# Patient Record
Sex: FEMALE | Race: WHITE
[De-identification: names, ages, dates, MRNs, and addresses within clinical notes are randomized per-mention and may not be internally consistent; named-entity substitution may affect disease eponyms.]

---

## 2020-06-18 ENCOUNTER — HOSPITAL ENCOUNTER (EMERGENCY)
Dept: HOSPITAL 83 - ED | Age: 40
Discharge: HOME | End: 2020-06-18
Payer: COMMERCIAL

## 2020-06-18 VITALS — WEIGHT: 230 LBS | BODY MASS INDEX: 38.32 KG/M2 | HEIGHT: 65 IN
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
## 2020-06-18 DIAGNOSIS — Z91.030: ICD-10-CM
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
## 2020-06-18 DIAGNOSIS — S82.891A: Primary | ICD-10-CM
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
## 2020-06-18 DIAGNOSIS — Z88.0: ICD-10-CM

## 2020-06-18 DIAGNOSIS — Z79.899: ICD-10-CM

## 2022-05-25 ENCOUNTER — HOSPITAL ENCOUNTER (EMERGENCY)
Dept: HOSPITAL 83 - ED | Age: 42
Discharge: HOME | End: 2022-05-25
Payer: COMMERCIAL

## 2022-05-25 VITALS — WEIGHT: 200 LBS | HEIGHT: 55 IN

## 2022-05-25 DIAGNOSIS — B34.9: Primary | ICD-10-CM

## 2022-05-25 LAB
ALP SERPL-CCNC: 70 U/L (ref 45–117)
ALT SERPL W P-5'-P-CCNC: 20 U/L (ref 12–78)
AST SERPL-CCNC: 9 IU/L (ref 3–35)
B-HCG SERPL-ACNC: < 1 MIU/ML (ref 1–3)
BASOPHILS # BLD AUTO: 0 10*3/UL (ref 0–0.1)
BASOPHILS NFR BLD AUTO: 0.2 % (ref 0–1)
BUN SERPL-MCNC: 8 MG/DL (ref 7–24)
CHLORIDE SERPL-SCNC: 110 MMOL/L (ref 98–107)
CREAT SERPL-MCNC: 0.72 MG/DL (ref 0.55–1.02)
EOSINOPHIL # BLD AUTO: 0.1 10*3/UL (ref 0–0.4)
EOSINOPHIL # BLD AUTO: 0.7 % (ref 1–4)
ERYTHROCYTE [DISTWIDTH] IN BLOOD BY AUTOMATED COUNT: 13 % (ref 0–14.5)
HCT VFR BLD AUTO: 38.3 % (ref 37–47)
LYMPHOCYTES # BLD AUTO: 1.2 10*3/UL (ref 1.3–4.4)
LYMPHOCYTES NFR BLD AUTO: 11 % (ref 27–41)
MCH RBC QN AUTO: 32.7 PG (ref 27–31)
MCHC RBC AUTO-ENTMCNC: 33.7 G/DL (ref 33–37)
MCV RBC AUTO: 97 FL (ref 81–99)
MONOCYTES # BLD AUTO: 0.7 10*3/UL (ref 0.1–1)
MONOCYTES NFR BLD MANUAL: 6.6 % (ref 3–9)
NEUT #: 8.7 10*3/UL (ref 2.3–7.9)
NEUT %: 81.1 % (ref 47–73)
NRBC BLD QL AUTO: 0 % (ref 0–0)
PLATELET # BLD AUTO: 169 10*3/UL (ref 130–400)
PMV BLD AUTO: 11.7 FL (ref 9.6–12.3)
POTASSIUM SERPL-SCNC: 3.8 MMOL/L (ref 3.5–5.1)
PROT SERPL-MCNC: 6.1 GM/DL (ref 6.4–8.2)
RBC # BLD AUTO: 3.95 10*6/UL (ref 4.1–5.1)
SODIUM SERPL-SCNC: 141 MMOL/L (ref 136–145)
WBC NRBC COR # BLD AUTO: 10.8 10*3/UL (ref 4.8–10.8)

## 2022-07-19 ENCOUNTER — HOSPITAL ENCOUNTER (EMERGENCY)
Dept: HOSPITAL 83 - ED | Age: 42
LOS: 1 days | Discharge: ANOTHER HOSPITAL | End: 2022-07-20
Payer: COMMERCIAL

## 2022-07-19 VITALS — WEIGHT: 208 LBS | BODY MASS INDEX: 33.43 KG/M2 | HEIGHT: 65.98 IN

## 2022-07-19 DIAGNOSIS — Z90.49: ICD-10-CM

## 2022-07-19 DIAGNOSIS — Z88.0: ICD-10-CM

## 2022-07-19 DIAGNOSIS — Z90.710: ICD-10-CM

## 2022-07-19 DIAGNOSIS — Z91.013: ICD-10-CM

## 2022-07-19 DIAGNOSIS — N13.30: ICD-10-CM

## 2022-07-19 DIAGNOSIS — Z79.899: ICD-10-CM

## 2022-07-19 DIAGNOSIS — Z98.51: ICD-10-CM

## 2022-07-19 DIAGNOSIS — K57.32: Primary | ICD-10-CM

## 2022-07-19 LAB
ALP SERPL-CCNC: 92 U/L (ref 45–117)
ALT SERPL W P-5'-P-CCNC: 17 U/L (ref 12–78)
AST SERPL-CCNC: 17 IU/L (ref 3–35)
B-HCG SERPL-ACNC: < 1 MIU/ML (ref 1–3)
BACTERIA #/AREA URNS HPF: (no result) /[HPF]
BASOPHILS # BLD AUTO: 0 10*3/UL (ref 0–0.1)
BASOPHILS NFR BLD AUTO: 0.4 % (ref 0–1)
BUN SERPL-MCNC: 10 MG/DL (ref 7–24)
CHLORIDE SERPL-SCNC: 109 MMOL/L (ref 98–107)
CREAT SERPL-MCNC: 0.76 MG/DL (ref 0.55–1.02)
EOSINOPHIL # BLD AUTO: 0.1 10*3/UL (ref 0–0.4)
EOSINOPHIL # BLD AUTO: 1.3 % (ref 1–4)
EPI CELLS #/AREA URNS HPF: (no result) /[HPF]
ERYTHROCYTE [DISTWIDTH] IN BLOOD BY AUTOMATED COUNT: 12.1 % (ref 0–14.5)
HCT VFR BLD AUTO: 39.3 % (ref 37–47)
LIPASE SERPL-CCNC: 64 U/L (ref 73–393)
LYMPHOCYTES # BLD AUTO: 0.9 10*3/UL (ref 1.3–4.4)
LYMPHOCYTES NFR BLD AUTO: 12.8 % (ref 27–41)
MCH RBC QN AUTO: 30.6 PG (ref 27–31)
MCHC RBC AUTO-ENTMCNC: 32.3 G/DL (ref 33–37)
MCV RBC AUTO: 94.7 FL (ref 81–99)
MONOCYTES # BLD AUTO: 0.5 10*3/UL (ref 0.1–1)
MONOCYTES NFR BLD MANUAL: 7.3 % (ref 3–9)
NEUT #: 5.3 10*3/UL (ref 2.3–7.9)
NEUT %: 77.9 % (ref 47–73)
NRBC BLD QL AUTO: 0 10*3/UL (ref 0–0)
PH UR STRIP: 6 [PH] (ref 4.5–8)
PLATELET # BLD AUTO: 258 10*3/UL (ref 130–400)
PMV BLD AUTO: 10.2 FL (ref 9.6–12.3)
POTASSIUM SERPL-SCNC: 3.6 MMOL/L (ref 3.5–5.1)
PROT SERPL-MCNC: 6.6 GM/DL (ref 6.4–8.2)
RBC # BLD AUTO: 4.15 10*6/UL (ref 4.1–5.1)
SODIUM SERPL-SCNC: 137 MMOL/L (ref 136–145)
SP GR UR: <= 1.005 (ref 1–1.03)
UROBILINOGEN UR STRIP-MCNC: 0.2 E.U./DL (ref 0–1)
WBC #/AREA URNS HPF: (no result) WBC/HPF (ref 0–5)
WBC NRBC COR # BLD AUTO: 6.7 10*3/UL (ref 4.8–10.8)

## 2022-07-20 ENCOUNTER — HOSPITAL ENCOUNTER (INPATIENT)
Age: 42
LOS: 6 days | Discharge: HOME HEALTH | DRG: 244 | End: 2022-07-26
Attending: INTERNAL MEDICINE | Admitting: INTERNAL MEDICINE
Payer: MEDICAID

## 2022-07-20 DIAGNOSIS — K57.20 COLONIC DIVERTICULAR ABSCESS: Primary | ICD-10-CM

## 2022-07-20 PROBLEM — K21.9 GASTROESOPHAGEAL REFLUX DISEASE: Status: ACTIVE | Noted: 2022-07-20

## 2022-07-20 PROBLEM — L40.50 PSORIATIC ARTHRITIS (HCC): Status: ACTIVE | Noted: 2022-07-20

## 2022-07-20 PROBLEM — K57.92 DIVERTICULITIS: Status: ACTIVE | Noted: 2022-07-20

## 2022-07-20 PROBLEM — N13.30 HYDRONEPHROSIS: Status: ACTIVE | Noted: 2022-07-20

## 2022-07-20 PROBLEM — K57.80 DIVERTICULITIS OF INTESTINE WITH ABSCESS: Status: ACTIVE | Noted: 2022-07-20

## 2022-07-20 LAB
ALBUMIN SERPL-MCNC: 3 G/DL (ref 3.5–5.2)
ALP BLD-CCNC: 79 U/L (ref 35–104)
ALT SERPL-CCNC: 8 U/L (ref 0–32)
ANION GAP SERPL CALCULATED.3IONS-SCNC: 10 MMOL/L (ref 7–16)
AST SERPL-CCNC: 16 U/L (ref 0–31)
BACTERIA: ABNORMAL /HPF
BASOPHILS ABSOLUTE: 0.02 E9/L (ref 0–0.2)
BASOPHILS RELATIVE PERCENT: 0.3 % (ref 0–2)
BILIRUB SERPL-MCNC: 0.2 MG/DL (ref 0–1.2)
BILIRUBIN URINE: NEGATIVE
BLOOD, URINE: ABNORMAL
BUN BLDV-MCNC: 9 MG/DL (ref 6–20)
CALCIUM SERPL-MCNC: 8.5 MG/DL (ref 8.6–10.2)
CHLORIDE BLD-SCNC: 105 MMOL/L (ref 98–107)
CLARITY: CLEAR
CO2: 23 MMOL/L (ref 22–29)
COLOR: YELLOW
CREAT SERPL-MCNC: 0.7 MG/DL (ref 0.5–1)
EOSINOPHILS ABSOLUTE: 0.06 E9/L (ref 0.05–0.5)
EOSINOPHILS RELATIVE PERCENT: 1 % (ref 0–6)
EPITHELIAL CELLS, UA: ABNORMAL /HPF
GFR AFRICAN AMERICAN: >60
GFR NON-AFRICAN AMERICAN: >60 ML/MIN/1.73
GLUCOSE BLD-MCNC: 110 MG/DL (ref 74–99)
GLUCOSE URINE: NEGATIVE MG/DL
HCT VFR BLD CALC: 33.8 % (ref 34–48)
HEMOGLOBIN: 11.1 G/DL (ref 11.5–15.5)
IMMATURE GRANULOCYTES #: 0.02 E9/L
IMMATURE GRANULOCYTES %: 0.3 % (ref 0–5)
INR BLD: 1.3
KETONES, URINE: NEGATIVE MG/DL
LEUKOCYTE ESTERASE, URINE: NEGATIVE
LYMPHOCYTES ABSOLUTE: 1.08 E9/L (ref 1.5–4)
LYMPHOCYTES RELATIVE PERCENT: 18.9 % (ref 20–42)
MCH RBC QN AUTO: 30.8 PG (ref 26–35)
MCHC RBC AUTO-ENTMCNC: 32.8 % (ref 32–34.5)
MCV RBC AUTO: 93.9 FL (ref 80–99.9)
MONOCYTES ABSOLUTE: 0.41 E9/L (ref 0.1–0.95)
MONOCYTES RELATIVE PERCENT: 7.2 % (ref 2–12)
NEUTROPHILS ABSOLUTE: 4.13 E9/L (ref 1.8–7.3)
NEUTROPHILS RELATIVE PERCENT: 72.3 % (ref 43–80)
NITRITE, URINE: NEGATIVE
PDW BLD-RTO: 12 FL (ref 11.5–15)
PH UA: 6 (ref 5–9)
PLATELET # BLD: 226 E9/L (ref 130–450)
PMV BLD AUTO: 10.6 FL (ref 7–12)
POTASSIUM SERPL-SCNC: 3.8 MMOL/L (ref 3.5–5)
PROTEIN UA: NEGATIVE MG/DL
PROTHROMBIN TIME: 14.9 SEC (ref 9.3–12.4)
RBC # BLD: 3.6 E12/L (ref 3.5–5.5)
RBC UA: ABNORMAL /HPF (ref 0–2)
SODIUM BLD-SCNC: 138 MMOL/L (ref 132–146)
SPECIFIC GRAVITY UA: <=1.005 (ref 1–1.03)
TOTAL PROTEIN: 5.8 G/DL (ref 6.4–8.3)
UROBILINOGEN, URINE: 0.2 E.U./DL
WBC # BLD: 5.7 E9/L (ref 4.5–11.5)
WBC UA: ABNORMAL /HPF (ref 0–5)

## 2022-07-20 PROCEDURE — C9113 INJ PANTOPRAZOLE SODIUM, VIA: HCPCS | Performed by: INTERNAL MEDICINE

## 2022-07-20 PROCEDURE — 2580000003 HC RX 258: Performed by: INTERNAL MEDICINE

## 2022-07-20 PROCEDURE — 80053 COMPREHEN METABOLIC PANEL: CPT

## 2022-07-20 PROCEDURE — 36415 COLL VENOUS BLD VENIPUNCTURE: CPT

## 2022-07-20 PROCEDURE — 2580000003 HC RX 258: Performed by: STUDENT IN AN ORGANIZED HEALTH CARE EDUCATION/TRAINING PROGRAM

## 2022-07-20 PROCEDURE — 6360000002 HC RX W HCPCS: Performed by: INTERNAL MEDICINE

## 2022-07-20 PROCEDURE — 6370000000 HC RX 637 (ALT 250 FOR IP): Performed by: INTERNAL MEDICINE

## 2022-07-20 PROCEDURE — 99223 1ST HOSP IP/OBS HIGH 75: CPT | Performed by: INTERNAL MEDICINE

## 2022-07-20 PROCEDURE — 6360000002 HC RX W HCPCS: Performed by: STUDENT IN AN ORGANIZED HEALTH CARE EDUCATION/TRAINING PROGRAM

## 2022-07-20 PROCEDURE — 2060000000 HC ICU INTERMEDIATE R&B

## 2022-07-20 PROCEDURE — 87040 BLOOD CULTURE FOR BACTERIA: CPT

## 2022-07-20 PROCEDURE — 85610 PROTHROMBIN TIME: CPT

## 2022-07-20 PROCEDURE — 85025 COMPLETE CBC W/AUTO DIFF WBC: CPT

## 2022-07-20 PROCEDURE — 81001 URINALYSIS AUTO W/SCOPE: CPT

## 2022-07-20 PROCEDURE — 2500000003 HC RX 250 WO HCPCS: Performed by: STUDENT IN AN ORGANIZED HEALTH CARE EDUCATION/TRAINING PROGRAM

## 2022-07-20 RX ORDER — DICYCLOMINE HYDROCHLORIDE 10 MG/1
10 CAPSULE ORAL 3 TIMES DAILY
Status: ON HOLD | COMMUNITY
End: 2022-09-15

## 2022-07-20 RX ORDER — CIPROFLOXACIN 500 MG/1
500 TABLET, FILM COATED ORAL 2 TIMES DAILY
Status: ON HOLD | COMMUNITY
End: 2022-07-25 | Stop reason: HOSPADM

## 2022-07-20 RX ORDER — PANTOPRAZOLE SODIUM 40 MG/10ML
40 INJECTION, POWDER, LYOPHILIZED, FOR SOLUTION INTRAVENOUS DAILY
Status: DISCONTINUED | OUTPATIENT
Start: 2022-07-20 | End: 2022-07-25 | Stop reason: ALTCHOICE

## 2022-07-20 RX ORDER — POLYETHYLENE GLYCOL 3350 17 G/17G
17 POWDER, FOR SOLUTION ORAL DAILY PRN
Status: DISCONTINUED | OUTPATIENT
Start: 2022-07-20 | End: 2022-07-26 | Stop reason: HOSPADM

## 2022-07-20 RX ORDER — ONDANSETRON 4 MG/1
4 TABLET, ORALLY DISINTEGRATING ORAL EVERY 8 HOURS PRN
Status: DISCONTINUED | OUTPATIENT
Start: 2022-07-20 | End: 2022-07-26 | Stop reason: HOSPADM

## 2022-07-20 RX ORDER — SODIUM CHLORIDE 0.9 % (FLUSH) 0.9 %
5-40 SYRINGE (ML) INJECTION EVERY 12 HOURS SCHEDULED
Status: DISCONTINUED | OUTPATIENT
Start: 2022-07-20 | End: 2022-07-26 | Stop reason: HOSPADM

## 2022-07-20 RX ORDER — ACETAMINOPHEN 325 MG/1
650 TABLET ORAL EVERY 6 HOURS PRN
Status: DISCONTINUED | OUTPATIENT
Start: 2022-07-20 | End: 2022-07-26 | Stop reason: HOSPADM

## 2022-07-20 RX ORDER — ONDANSETRON 2 MG/ML
4 INJECTION INTRAMUSCULAR; INTRAVENOUS EVERY 6 HOURS PRN
Status: DISCONTINUED | OUTPATIENT
Start: 2022-07-20 | End: 2022-07-26 | Stop reason: HOSPADM

## 2022-07-20 RX ORDER — CELECOXIB 200 MG/1
200 CAPSULE ORAL 2 TIMES DAILY
Status: ON HOLD | COMMUNITY
End: 2022-09-15

## 2022-07-20 RX ORDER — SODIUM CHLORIDE 0.9 % (FLUSH) 0.9 %
5-40 SYRINGE (ML) INJECTION PRN
Status: DISCONTINUED | OUTPATIENT
Start: 2022-07-20 | End: 2022-07-26 | Stop reason: HOSPADM

## 2022-07-20 RX ORDER — CETIRIZINE HYDROCHLORIDE 10 MG/1
10 TABLET ORAL DAILY
COMMUNITY

## 2022-07-20 RX ORDER — MORPHINE SULFATE 2 MG/ML
2 INJECTION, SOLUTION INTRAMUSCULAR; INTRAVENOUS EVERY 4 HOURS PRN
Status: DISCONTINUED | OUTPATIENT
Start: 2022-07-20 | End: 2022-07-26 | Stop reason: HOSPADM

## 2022-07-20 RX ORDER — METRONIDAZOLE 500 MG/100ML
500 INJECTION, SOLUTION INTRAVENOUS EVERY 8 HOURS
Status: DISCONTINUED | OUTPATIENT
Start: 2022-07-20 | End: 2022-07-21

## 2022-07-20 RX ORDER — SODIUM CHLORIDE 9 MG/ML
INJECTION, SOLUTION INTRAVENOUS PRN
Status: DISCONTINUED | OUTPATIENT
Start: 2022-07-20 | End: 2022-07-26 | Stop reason: HOSPADM

## 2022-07-20 RX ORDER — METHOTREXATE 25 MG/ML
50 INJECTION, SOLUTION INTRA-ARTERIAL; INTRAMUSCULAR; INTRAVENOUS WEEKLY
Status: ON HOLD | COMMUNITY
End: 2022-09-09 | Stop reason: HOSPADM

## 2022-07-20 RX ORDER — CYANOCOBALAMIN 1000 UG/ML
1000 INJECTION INTRAMUSCULAR; SUBCUTANEOUS
Status: ON HOLD | COMMUNITY
End: 2022-09-15

## 2022-07-20 RX ORDER — FOLIC ACID 1 MG/1
1 TABLET ORAL DAILY
Status: ON HOLD | COMMUNITY
End: 2022-09-15

## 2022-07-20 RX ORDER — ACETAMINOPHEN 650 MG/1
650 SUPPOSITORY RECTAL EVERY 6 HOURS PRN
Status: DISCONTINUED | OUTPATIENT
Start: 2022-07-20 | End: 2022-07-26 | Stop reason: HOSPADM

## 2022-07-20 RX ORDER — OMEPRAZOLE 20 MG/1
20 CAPSULE, DELAYED RELEASE ORAL DAILY
COMMUNITY

## 2022-07-20 RX ORDER — METRONIDAZOLE 500 MG/1
500 TABLET ORAL 3 TIMES DAILY
Status: ON HOLD | COMMUNITY
End: 2022-07-22 | Stop reason: HOSPADM

## 2022-07-20 RX ORDER — ENOXAPARIN SODIUM 100 MG/ML
40 INJECTION SUBCUTANEOUS DAILY
Status: DISCONTINUED | OUTPATIENT
Start: 2022-07-20 | End: 2022-07-26 | Stop reason: HOSPADM

## 2022-07-20 RX ORDER — ONDANSETRON 4 MG/1
4 TABLET, FILM COATED ORAL EVERY 8 HOURS PRN
COMMUNITY

## 2022-07-20 RX ADMIN — SODIUM CHLORIDE, PRESERVATIVE FREE 10 ML: 5 INJECTION INTRAVENOUS at 20:38

## 2022-07-20 RX ADMIN — PANTOPRAZOLE SODIUM 40 MG: 40 INJECTION, POWDER, FOR SOLUTION INTRAVENOUS at 14:42

## 2022-07-20 RX ADMIN — METRONIDAZOLE 500 MG: 500 INJECTION, SOLUTION INTRAVENOUS at 16:55

## 2022-07-20 RX ADMIN — ACETAMINOPHEN 650 MG: 325 TABLET ORAL at 20:38

## 2022-07-20 RX ADMIN — ACETAMINOPHEN 650 MG: 325 TABLET ORAL at 14:42

## 2022-07-20 RX ADMIN — ENOXAPARIN SODIUM 40 MG: 100 INJECTION SUBCUTANEOUS at 14:42

## 2022-07-20 RX ADMIN — CEFEPIME 2000 MG: 2 INJECTION, POWDER, FOR SOLUTION INTRAVENOUS at 16:55

## 2022-07-20 ASSESSMENT — PAIN DESCRIPTION - FREQUENCY
FREQUENCY: CONTINUOUS

## 2022-07-20 ASSESSMENT — PAIN DESCRIPTION - LOCATION
LOCATION: BACK

## 2022-07-20 ASSESSMENT — PAIN DESCRIPTION - PAIN TYPE
TYPE: ACUTE PAIN

## 2022-07-20 ASSESSMENT — PAIN SCALES - GENERAL
PAINLEVEL_OUTOF10: 6
PAINLEVEL_OUTOF10: 2
PAINLEVEL_OUTOF10: 2

## 2022-07-20 ASSESSMENT — PAIN DESCRIPTION - ORIENTATION
ORIENTATION: RIGHT;LEFT
ORIENTATION: RIGHT;LEFT

## 2022-07-20 ASSESSMENT — PAIN DESCRIPTION - DESCRIPTORS
DESCRIPTORS: DISCOMFORT

## 2022-07-20 NOTE — PROGRESS NOTES
Spoke to Anuj NP, she is aware of consult.  Text Dr Mira Florentino for new consult and also let Dr Jia Ochoa know

## 2022-07-20 NOTE — H&P
[facility]   History and Physical      CHIEF COMPLAINT:  abdominal pain    History of Present Illness: pt is a 36 y.o. female who presents for abdominal pain. Pt states symptoms started on 7/3 with low back pain but over time migrated over more into lower abdomen. Pt noted pain would worsen with bm. Pt went to see pcp because thought initially it was a uti but was tested and was not uti. Pt went to [facility] and according to pt had a ct scan and was given anti inflammatories. These did not work and went to her gi doctor as oupt and was given cipro and flagyl which did not help. Pt originally went back to [place] but due to long wait went to [facility]. Pt appears to have been given meropenem while there. Pt was found to have diverticular abscess and hydronephrosis seen on ct scan. Pt was transferred here for further evaluation and tx by surg and urology. Pt states she has chronic symptoms from her medication for psoriasis that she can not tell if the chills is from that or the infection. Pt c/o nausea and diarrhea. Pt denies vomiting, constipation, melena, hematochezia, change in urination, dysuria, or hematuria. REVIEW OF SYSTEMS:    A detailed system review was conducted with patient and is negative unless stated in hpi. PMH:  No past medical history on file. Surgical History:  Past Surgical History:   Procedure Laterality Date    ABDOMEN SURGERY      CHOLECYSTECTOMY      COLONOSCOPY      COLONOSCOPY W/ POLYPECTOMY      HYSTERECTOMY (CERVIX STATUS UNKNOWN)      LYMPHADENECTOMY Right        Medications Prior to Admission:    Prior to Admission medications    Medication Sig Start Date End Date Taking? Authorizing Provider   cetirizine (ZYRTEC) 10 MG tablet Take 10 mg by mouth in the morning. Yes Historical Provider, MD   dicyclomine (BENTYL) 10 MG capsule Take 10 mg by mouth in the morning and 10 mg at noon and 10 mg before bedtime.    Yes Historical Provider, MD ciprofloxacin (CIPRO) 500 MG tablet Take 500 mg by mouth in the morning and 500 mg before bedtime. Yes Historical Provider, MD   metroNIDAZOLE (FLAGYL) 500 MG tablet Take 500 mg by mouth in the morning and 500 mg at noon and 500 mg before bedtime. Yes Historical Provider, MD   ondansetron (ZOFRAN) 4 MG tablet Take 4 mg by mouth every 8 hours as needed for Nausea or Vomiting   Yes Historical Provider, MD   celecoxib (CELEBREX) 200 MG capsule Take 200 mg by mouth in the morning and 200 mg before bedtime. Yes Historical Provider, MD   methotrexate Sodium 50 MG/2ML chemo injection Inject 50 mg into the muscle once a week Dose is 0.6 ml   Yes Historical Provider, MD   cyanocobalamin 1000 MCG/ML injection Inject 1,000 mcg into the muscle every 30 days   Yes Historical Provider, MD   folic acid (FOLVITE) 1 MG tablet Take 1 mg by mouth in the morning. Yes Historical Provider, MD   omeprazole (PRILOSEC) 20 MG delayed release capsule Take 20 mg by mouth in the morning. Yes Historical Provider, MD       Allergies:    Bee venom and Pcn [penicillins]    Social History:    reports that she has been smoking cigarettes. She has a 26.00 pack-year smoking history. She has never used smokeless tobacco. She reports that she does not drink alcohol and does not use drugs. Family History:       Problem Relation Age of Onset    High Blood Pressure Mother     Cancer Father      PHYSICAL EXAM:    Vitals:  /62   Pulse (!) 45   Temp 98.1 °F (36.7 °C) (Oral)   Resp 16   Ht 5' 6\" (1.676 m)   Wt 214 lb 4.6 oz (97.2 kg)   SpO2 100%   BMI 34.59 kg/m²     General:  Appears comfortable. Answers questions appropriately and cooperative with exam  HEENT:  Mucous membranes moist. No erythema, rhinorrhea, or post-nasal drip noted. Neck:  No carotid bruits. Heart:  Rhythm regular at rate of 50  Lungs:  CTA. No wheeze, rales, or rhonchi  Abdomen:  Positive bowel sounds positive. Soft. Non-tender.  No guarding, rebound or rigidity. Breast/Rectal/Genitourinary: not pertinent. Extremities:  Negative for lower extremity edema  Skin:  Warm and dry  Vascular: 2/4 Dorsalis Pedis pulses bilaterally. Neuro:  Cranial nerves 2-12 grossly intact, no focal weakness or change in sensation noted. Extraocular muscles intact. Pupils equal, round, reactive to light. LABS:  No results for input(s): NA, K, CL, CO2, BUN, CREATININE, GLUCOSE, CALCIUM in the last 72 hours. No results for input(s): WBC, RBC, HGB, HCT, MCV, MCH, MCHC, RDW, PLT, MPV in the last 72 hours. No results for input(s): POCGLU in the last 72 hours. Radiology: No results found. ASSESSMENT:      Principal Problem:    Diverticulitis  Resolved Problems:    * No resolved hospital problems. *      PLAN:    Diverticulitis with abscess  will consult ID given pt has not improved on initial abx as well as being on methotrexate and had been placed on meropenem in [facility]. Will ask surgery to evaluate as well  Hydronephrosis urology consulted  Psoriatic arthritis/psoriasis given active infection, will hold off MTX for now   GERD pt states she has reflux.  Will give ppi           Electronically signed by Corazon Rader DO on 7/20/2022 at 1:32 PM

## 2022-07-20 NOTE — CONSULTS
7/20/2022 4:28 PM  Service: Urology  Group: [facility] (Les/Estefani/Sergey)    Chacha Carlito  [id number]     Chief Complaint:    Left hydronephrosis    History of Present Illness: The patient is a 36 y.o. female patient who presented to the hospital today after being transferred from [facility] for evaluation of diverticular abscess. She had a CT abdomen pelvis performed in surgical hospital that showed a sigmoid diverticulitis with abscess as well as left-sided hydronephrosis. She has been evaluated by general surgery and planned for IR drainage of the abscess. We were asked to evaluate for left-sided hydronephrosis. She denies any flank pain. She does have history of microscopic hematuria for which she is seeing Dr. Wai Whitt in [place] in the past.      No past medical history on file. Past Surgical History:   Procedure Laterality Date    ABDOMEN SURGERY      CHOLECYSTECTOMY      COLONOSCOPY      COLONOSCOPY W/ POLYPECTOMY      HYSTERECTOMY (CERVIX STATUS UNKNOWN)      LYMPHADENECTOMY Right        Medications Prior to Admission:    Medications Prior to Admission: cetirizine (ZYRTEC) 10 MG tablet, Take 10 mg by mouth in the morning. dicyclomine (BENTYL) 10 MG capsule, Take 10 mg by mouth in the morning and 10 mg at noon and 10 mg before bedtime. ciprofloxacin (CIPRO) 500 MG tablet, Take 500 mg by mouth in the morning and 500 mg before bedtime. metroNIDAZOLE (FLAGYL) 500 MG tablet, Take 500 mg by mouth in the morning and 500 mg at noon and 500 mg before bedtime. ondansetron (ZOFRAN) 4 MG tablet, Take 4 mg by mouth every 8 hours as needed for Nausea or Vomiting  celecoxib (CELEBREX) 200 MG capsule, Take 200 mg by mouth in the morning and 200 mg before bedtime.   methotrexate Sodium 50 MG/2ML chemo injection, Inject 50 mg into the muscle once a week Dose is 0.6 ml  cyanocobalamin 1000 MCG/ML injection, Inject 1,000 mcg into the muscle every 30 days  folic acid (FOLVITE) 1 MG tablet, Take 1 mg by mouth in the morning. omeprazole (PRILOSEC) 20 MG delayed release capsule, Take 20 mg by mouth in the morning. Allergies:    Bee venom and Pcn [penicillins]    Social History:    reports that she has been smoking cigarettes. She has a 26.00 pack-year smoking history. She has never used smokeless tobacco. She reports that she does not drink alcohol and does not use drugs. Family History:   Non-contributory to this Urological problem  family history includes Cancer in her father; High Blood Pressure in her mother. Review of Systems:  Constitutional: No fever or chills   Respiratory: negative for cough and hemoptysis  Cardiovascular: negative for chest pain and dyspnea  Gastrointestinal: Positive for abdominal pain  Derm: negative for rash and skin lesion(s)  Neurological: negative for seizures and tremors  Musculoskeletal: Negative    Psychiatric: Negative   : As above in the HPI, otherwise negative  All other reviews are negative    Physical Exam:     Vitals:  /62   Pulse (!) 45   Temp 98.1 °F (36.7 °C) (Oral)   Resp 16   Ht 5' 6\" (1.676 m)   Wt 214 lb 4.6 oz (97.2 kg)   SpO2 100%   BMI 34.59 kg/m²     General:  Awake, alert, oriented X 3. No apparent distress. HEENT:  Normocephalic, atraumatic. Lungs:  Respirations symmetric and non-labored. Abdomen:  soft, nontender, no masses  Extremities:  No clubbing, cyanosis, or edema  Skin:  Warm and dry, no open lesions or rashes  Neuro:  There are no motor or sensory deficits in the 4 quadrant extremities   Rectal: deferred  Genitourinary: No [place]    Labs:     Recent Labs     07/20/22  1435   WBC 5.7   RBC 3.60   HGB 11.1*   HCT 33.8*   MCV 93.9   MCH 30.8   MCHC 32.8   RDW 12.0      MPV 10.6         Recent Labs     07/20/22  1435   CREATININE 0.7       No results found for: PSA          Assessment/plan:  Left-sided hydronephrosis  Diverticular abscess      Creatinine stable  No leukocytosis  Antibiotics per infectious disease  Left-sided hydronephrosis likely secondary to inflammatory process from diverticular abscess  Hold on any acute intervention such as stenting at this time  General surgery following with plans for IR drainage of the abscess  We will repeat a renal ultrasound 1 to 2 days following drainage of the abscess to be sure that the hydronephrosis has resolved  Will follow            Electronically signed by JEFF Cui CNP on 7/20/2022 at 4:28 PM    I agree with the nurse practitioner assessment and plan. I personally evaluated the patient and made any changes to reflect my impression and plan. Will see if clinically improves with drainage of abscess. Will need KIYA after abscess drained. If no improvement would need cystoscopy and stent.      Connie Louise MD

## 2022-07-20 NOTE — CONSULTS
[address] [place] [address] [facility]    Consultation Note     Admit Date: 7/20/2022 12:10 PM    Reason for Consult:   diverticulitis    Attending Physician:  Melissa Martinez DO     Chief Complaint: abdominal pain    HISTORY OF PRESENT ILLNESS:   The patient is a 36 y.o.  female not known to the Infectious Diseases service. She has hx of Psoriasis and arthritis on Mtx weekly since October. The patient started having abdominal pain since July 3rd. She saw her PCP and did not have UTI. She was seen by GI and placed on Omnicef and flagyl a week ago. When ever she has a bowel movement its watery, but feels like she hasnot finished BM. Has right sided abdominal pain. No fever or chills. She went to [facility] yesterday. CT a/p showed sigmoid diverticulitis with possible abscess in between rectum and bladder. Pt got a dose of meropenem and got transferred here for further management. Labs from [place] [address] liver [place] showed normal CBC. CMP. Since admission. Here, she is afebrile, HS stab;e no labs done yet. I got consulted for antibiotic recommendations. Past Medical History:    No past medical history on file.   Past Surgical History:        Procedure Laterality Date    ABDOMEN SURGERY      CHOLECYSTECTOMY      COLONOSCOPY      COLONOSCOPY W/ POLYPECTOMY      HYSTERECTOMY (CERVIX STATUS UNKNOWN)      LYMPHADENECTOMY Right      Current Medications:   Scheduled Meds:   sodium chloride flush  5-40 mL IntraVENous 2 times per day    enoxaparin  40 mg SubCUTAneous Daily    pantoprazole  40 mg IntraVENous Daily     Continuous Infusions:   sodium chloride       PRN Meds:sodium chloride flush, sodium chloride, ondansetron **OR** ondansetron, polyethylene glycol, acetaminophen **OR** acetaminophen    Allergies:  Bee venom and Pcn [penicillins]    Social History:   Social History     Socioeconomic History    Marital status:    Tobacco Use    Smoking status: Every Day     Packs/day: 1.00     Years: 26.00     Pack years: 26.00     Types: Cigarettes    Smokeless tobacco: Never   Vaping Use    Vaping Use: Never used   Substance and Sexual Activity    Alcohol use: Never    Drug use: Never    Sexual activity: Yes     Partners: Male     Tobacco: No  Alcohol: No  Pets: No  Travel: No    Family History:       Problem Relation Age of Onset    High Blood Pressure Mother     Cancer Father    . Otherwise non-pertinent to the chief complaint. REVIEW OF SYSTEMS:    As mentioned in HPI, all other systems negative. PHYSICAL EXAM:    Vitals:    /62   Pulse (!) 45   Temp 98.1 °F (36.7 °C) (Oral)   Resp 16   Ht 5' 6\" (1.676 m)   Wt 214 lb 4.6 oz (97.2 kg)   SpO2 100%   BMI 34.59 kg/m²   Constitutional: The patient is awake, alert, and oriented. Skin: Warm and dry. No rashes were noted. No jaundice. HEENT: Eyes show round, and reactive pupils. Moist mucous membranes, no ulcerations, no thrush. Neck: Supple to movements. No lymphadenopathy. Chest: clear to auscultation  Cardiovascular: S1 and S2 are rhythmic and regular. No murmurs appreciated. Abdomen: soft, tenderness right lumbar and LQ  Extremities: No clubbing, no cyanosis, no edema.   Musculoskeletal: no gross motor abnormalities  Neurological: alert, oriented x 3  Lines: peripheral      CBC+dif:  Recent Labs     07/20/22  1435   WBC 5.7   HGB 11.1*   HCT 33.8*   MCV 93.9      NEUTROABS 4.13     No results found for: CRP  No results found for: CRPHS  No results found for: SEDRATE  Lab Results   Component Value Date    ALT 8 07/20/2022    AST 16 07/20/2022    ALKPHOS 79 07/20/2022    BILITOT 0.2 07/20/2022     Lab Results   Component Value Date/Time     07/20/2022 02:35 PM    K 3.8 07/20/2022 02:35 PM     07/20/2022 02:35 PM    CO2 23 07/20/2022 02:35 PM    BUN 9 07/20/2022 02:35 PM    CREATININE 0.7 07/20/2022 02:35 PM    GFRAA >60 07/20/2022 02:35 PM    LABGLOM >60 07/20/2022 02:35 PM    GLUCOSE 110 07/20/2022 02:35 PM PROT 5.8 07/20/2022 02:35 PM    LABALBU 3.0 07/20/2022 02:35 PM    CALCIUM 8.5 07/20/2022 02:35 PM    BILITOT 0.2 07/20/2022 02:35 PM    ALKPHOS 79 07/20/2022 02:35 PM    AST 16 07/20/2022 02:35 PM    ALT 8 07/20/2022 02:35 PM       No results found for: PROTIME, INR    No results found for: TSH    No results found for: NITRITE, COLORU, PHUR, LABCAST, WBCUA, RBCUA, MUCUS, TRICHOMONAS, YEAST, BACTERIA, CLARITYU, SPECGRAV, LEUKOCYTESUR, UROBILINOGEN, BILIRUBINUR, BLOODU, GLUCOSEU, AMORPHOUS    No results found for: Sueellen Varun, R7NUAAIV, PHART, THGBART, QQR3VIR, PO2ART, HHH9XJU  Radiology:  IR Interventional Radiology Procedure Request    (Results Pending)       Microbiology:  Pending  No results for input(s): BC in the last 72 hours. No results for input(s): ORG in the last 72 hours. No results for input(s): [place] in the last 72 hours. No results for input(s): STREPNEUMAGU in the last 72 hours. No results for input(s): LP1UAG in the last 72 hours. No results for input(s): ASO in the last 72 hours. No results for input(s): CULTRESP in the last 72 hours. Assessment:  Sigmoid diverticulitis with abscess between rectum/bladder: reviewed surgery notes. Requested IR for drain placement. Psoriasis on Mtx: last dose last Saturday  Immunocompromised host:     Plan:    Blood cx, UA  Started on IV cefepime 2gr q 8 and flagyl 500 q8  Will follow with you    Thank you for having us see this patient in consultation. I will be discussing this case with the treating physicians.       Electronically signed by Dread Salinas MD on 7/20/2022 at 3:29 PM

## 2022-07-20 NOTE — CONSULTS
Authorizing Provider   cetirizine (ZYRTEC) 10 MG tablet Take 10 mg by mouth in the morning. Yes Historical Provider, MD   dicyclomine (BENTYL) 10 MG capsule Take 10 mg by mouth in the morning and 10 mg at noon and 10 mg before bedtime. Yes Historical Provider, MD   ciprofloxacin (CIPRO) 500 MG tablet Take 500 mg by mouth in the morning and 500 mg before bedtime. Yes Historical Provider, MD   metroNIDAZOLE (FLAGYL) 500 MG tablet Take 500 mg by mouth in the morning and 500 mg at noon and 500 mg before bedtime. Yes Historical Provider, MD   ondansetron (ZOFRAN) 4 MG tablet Take 4 mg by mouth every 8 hours as needed for Nausea or Vomiting   Yes Historical Provider, MD   celecoxib (CELEBREX) 200 MG capsule Take 200 mg by mouth in the morning and 200 mg before bedtime. Yes Historical Provider, MD   methotrexate Sodium 50 MG/2ML chemo injection Inject 50 mg into the muscle once a week Dose is 0.6 ml   Yes Historical Provider, MD   cyanocobalamin 1000 MCG/ML injection Inject 1,000 mcg into the muscle every 30 days   Yes Historical Provider, MD   folic acid (FOLVITE) 1 MG tablet Take 1 mg by mouth in the morning. Yes Historical Provider, MD   omeprazole (PRILOSEC) 20 MG delayed release capsule Take 20 mg by mouth in the morning.    Yes Historical Provider, MD       Allergies   Allergen Reactions    Bee Venom Swelling    Pcn [Penicillins] Hives, Swelling and Other (See Comments)     Elevated temp       Family History   Problem Relation Age of Onset    High Blood Pressure Mother     Cancer Father        Social History     Tobacco Use    Smoking status: Every Day     Packs/day: 1.00     Years: 26.00     Pack years: 26.00     Types: Cigarettes    Smokeless tobacco: Never   Vaping Use    Vaping Use: Never used   Substance Use Topics    Alcohol use: Never    Drug use: Never         Review of Systems   General ROS: negative for - chills or fever  Hematological and Lymphatic ROS: negative for - bleeding problems or blood clots  Respiratory ROS: negative for - cough or shortness of breath  Cardiovascular ROS: no chest pain or dyspnea on exertion  Gastrointestinal ROS: Side of abdominal pain and constipation. Negative for nausea and vomiting. Genito-Urinary ROS: no dysuria, trouble voiding, or hematuria  Musculoskeletal ROS: negative for - muscle pain or muscular weakness      PHYSICAL EXAM:    Vitals:    07/20/22 1200   BP: 102/62   Pulse: (!) 45   Resp: 16   Temp: 98.1 °F (36.7 °C)   SpO2: 100%       General Appearance:  awake, alert, oriented, in no acute distress  Skin:  Skin color, texture, turgor normal. No rashes or lesions. Head/face:  NCAT  Eyes:  No gross abnormalities. Lungs: Normal work of breathing on room air  Heart: Bradycardic, soft pressures. Abdomen: Soft, focal left lower quadrant abdominal tenderness, nondistended  Extremities: Extremities warm to touch, pink, with no edema. LABS:    CBC  No results for input(s): WBC, HGB, HCT, PLT in the last 72 hours. BMP  No results for input(s): NA, K, CL, CO2, BUN, CREATININE, GLU, CALCIUM in the last 72 hours. Liver Function  No results for input(s): AMYLASE, LIPASE, BILITOT, BILIDIR, AST, ALT, ALKPHOS, PROT, LABALBU in the last 72 hours. No results for input(s): LACTATE in the last 72 hours. No results for input(s): INR, PTT in the last 72 hours. Invalid input(s): PT    RADIOLOGY    No results found. ASSESSMENT:  36 y.o. female with sigmoid diverticulitis and abscess.     PLAN:  Okay for clear liquid diet today  N.p.o. at midnight  Hold anticoagulation  INR  Will ask IR team to attempt to drain the abscess  Antibiotics per infectious disease team  Appreciate urology team recommendations  Will need outpatient colonoscopy in 6 to 8 weeks after this episode has resolved    Electronically signed by Beth Walters MD on 7/20/22 at 2:34 PM EDT

## 2022-07-21 ENCOUNTER — APPOINTMENT (OUTPATIENT)
Dept: CT IMAGING | Age: 42
DRG: 244 | End: 2022-07-21
Attending: INTERNAL MEDICINE
Payer: MEDICAID

## 2022-07-21 LAB
ALBUMIN SERPL-MCNC: 3.1 G/DL (ref 3.5–5.2)
ALP BLD-CCNC: 78 U/L (ref 35–104)
ALT SERPL-CCNC: 9 U/L (ref 0–32)
ANION GAP SERPL CALCULATED.3IONS-SCNC: 10 MMOL/L (ref 7–16)
AST SERPL-CCNC: 14 U/L (ref 0–31)
BASOPHILS ABSOLUTE: 0.03 E9/L (ref 0–0.2)
BASOPHILS RELATIVE PERCENT: 0.5 % (ref 0–2)
BILIRUB SERPL-MCNC: <0.2 MG/DL (ref 0–1.2)
BUN BLDV-MCNC: 9 MG/DL (ref 6–20)
CALCIUM SERPL-MCNC: 8.6 MG/DL (ref 8.6–10.2)
CHLORIDE BLD-SCNC: 107 MMOL/L (ref 98–107)
CO2: 22 MMOL/L (ref 22–29)
CREAT SERPL-MCNC: 0.7 MG/DL (ref 0.5–1)
EOSINOPHILS ABSOLUTE: 0.07 E9/L (ref 0.05–0.5)
EOSINOPHILS RELATIVE PERCENT: 1.2 % (ref 0–6)
GFR AFRICAN AMERICAN: >60
GFR NON-AFRICAN AMERICAN: >60 ML/MIN/1.73
GLUCOSE BLD-MCNC: 92 MG/DL (ref 74–99)
HCT VFR BLD CALC: 34.7 % (ref 34–48)
HEMOGLOBIN: 11.6 G/DL (ref 11.5–15.5)
IMMATURE GRANULOCYTES #: 0.01 E9/L
IMMATURE GRANULOCYTES %: 0.2 % (ref 0–5)
LYMPHOCYTES ABSOLUTE: 1.26 E9/L (ref 1.5–4)
LYMPHOCYTES RELATIVE PERCENT: 21.7 % (ref 20–42)
MAGNESIUM: 2.1 MG/DL (ref 1.6–2.6)
MCH RBC QN AUTO: 31.5 PG (ref 26–35)
MCHC RBC AUTO-ENTMCNC: 33.4 % (ref 32–34.5)
MCV RBC AUTO: 94.3 FL (ref 80–99.9)
MONOCYTES ABSOLUTE: 0.43 E9/L (ref 0.1–0.95)
MONOCYTES RELATIVE PERCENT: 7.4 % (ref 2–12)
NEUTROPHILS ABSOLUTE: 4.01 E9/L (ref 1.8–7.3)
NEUTROPHILS RELATIVE PERCENT: 69 % (ref 43–80)
PDW BLD-RTO: 12.1 FL (ref 11.5–15)
PHOSPHORUS: 2.9 MG/DL (ref 2.5–4.5)
PLATELET # BLD: 242 E9/L (ref 130–450)
PMV BLD AUTO: 10.7 FL (ref 7–12)
POTASSIUM SERPL-SCNC: 3.9 MMOL/L (ref 3.5–5)
RBC # BLD: 3.68 E12/L (ref 3.5–5.5)
SODIUM BLD-SCNC: 139 MMOL/L (ref 132–146)
TOTAL PROTEIN: 5.9 G/DL (ref 6.4–8.3)
WBC # BLD: 5.8 E9/L (ref 4.5–11.5)

## 2022-07-21 PROCEDURE — 99232 SBSQ HOSP IP/OBS MODERATE 35: CPT | Performed by: INTERNAL MEDICINE

## 2022-07-21 PROCEDURE — 6360000002 HC RX W HCPCS: Performed by: INTERNAL MEDICINE

## 2022-07-21 PROCEDURE — 2500000003 HC RX 250 WO HCPCS: Performed by: STUDENT IN AN ORGANIZED HEALTH CARE EDUCATION/TRAINING PROGRAM

## 2022-07-21 PROCEDURE — 80053 COMPREHEN METABOLIC PANEL: CPT

## 2022-07-21 PROCEDURE — 83735 ASSAY OF MAGNESIUM: CPT

## 2022-07-21 PROCEDURE — 6360000002 HC RX W HCPCS: Performed by: STUDENT IN AN ORGANIZED HEALTH CARE EDUCATION/TRAINING PROGRAM

## 2022-07-21 PROCEDURE — 74177 CT ABD & PELVIS W/CONTRAST: CPT

## 2022-07-21 PROCEDURE — 84100 ASSAY OF PHOSPHORUS: CPT

## 2022-07-21 PROCEDURE — 2580000003 HC RX 258: Performed by: INTERNAL MEDICINE

## 2022-07-21 PROCEDURE — 99222 1ST HOSP IP/OBS MODERATE 55: CPT | Performed by: NURSE PRACTITIONER

## 2022-07-21 PROCEDURE — 6360000002 HC RX W HCPCS: Performed by: NURSE PRACTITIONER

## 2022-07-21 PROCEDURE — 2580000003 HC RX 258: Performed by: STUDENT IN AN ORGANIZED HEALTH CARE EDUCATION/TRAINING PROGRAM

## 2022-07-21 PROCEDURE — 2580000003 HC RX 258

## 2022-07-21 PROCEDURE — C9113 INJ PANTOPRAZOLE SODIUM, VIA: HCPCS | Performed by: INTERNAL MEDICINE

## 2022-07-21 PROCEDURE — 36415 COLL VENOUS BLD VENIPUNCTURE: CPT

## 2022-07-21 PROCEDURE — 2060000000 HC ICU INTERMEDIATE R&B

## 2022-07-21 PROCEDURE — 6370000000 HC RX 637 (ALT 250 FOR IP): Performed by: STUDENT IN AN ORGANIZED HEALTH CARE EDUCATION/TRAINING PROGRAM

## 2022-07-21 PROCEDURE — 6370000000 HC RX 637 (ALT 250 FOR IP): Performed by: INTERNAL MEDICINE

## 2022-07-21 PROCEDURE — 85025 COMPLETE CBC W/AUTO DIFF WBC: CPT

## 2022-07-21 PROCEDURE — 99222 1ST HOSP IP/OBS MODERATE 55: CPT | Performed by: SURGERY

## 2022-07-21 RX ORDER — LIDOCAINE HYDROCHLORIDE 10 MG/ML
5 INJECTION, SOLUTION EPIDURAL; INFILTRATION; INTRACAUDAL; PERINEURAL ONCE
Status: COMPLETED | OUTPATIENT
Start: 2022-07-21 | End: 2022-07-22

## 2022-07-21 RX ORDER — SODIUM CHLORIDE 0.9 % (FLUSH) 0.9 %
5-40 SYRINGE (ML) INJECTION PRN
Status: DISCONTINUED | OUTPATIENT
Start: 2022-07-21 | End: 2022-07-26 | Stop reason: HOSPADM

## 2022-07-21 RX ORDER — DEXTROSE, SODIUM CHLORIDE, AND POTASSIUM CHLORIDE 5; .45; .15 G/100ML; G/100ML; G/100ML
INJECTION INTRAVENOUS CONTINUOUS
Status: DISCONTINUED | OUTPATIENT
Start: 2022-07-21 | End: 2022-07-24

## 2022-07-21 RX ORDER — HEPARIN SODIUM (PORCINE) LOCK FLUSH IV SOLN 100 UNIT/ML 100 UNIT/ML
3 SOLUTION INTRAVENOUS EVERY 12 HOURS SCHEDULED
Status: DISCONTINUED | OUTPATIENT
Start: 2022-07-21 | End: 2022-07-26 | Stop reason: HOSPADM

## 2022-07-21 RX ORDER — METRONIDAZOLE 500 MG/1
500 TABLET ORAL EVERY 8 HOURS SCHEDULED
Status: DISCONTINUED | OUTPATIENT
Start: 2022-07-21 | End: 2022-07-26 | Stop reason: HOSPADM

## 2022-07-21 RX ORDER — HEPARIN SODIUM (PORCINE) LOCK FLUSH IV SOLN 100 UNIT/ML 100 UNIT/ML
3 SOLUTION INTRAVENOUS PRN
Status: DISCONTINUED | OUTPATIENT
Start: 2022-07-21 | End: 2022-07-26 | Stop reason: HOSPADM

## 2022-07-21 RX ORDER — SODIUM CHLORIDE 0.9 % (FLUSH) 0.9 %
5-40 SYRINGE (ML) INJECTION EVERY 12 HOURS SCHEDULED
Status: DISCONTINUED | OUTPATIENT
Start: 2022-07-21 | End: 2022-07-26 | Stop reason: HOSPADM

## 2022-07-21 RX ORDER — SODIUM CHLORIDE 9 MG/ML
INJECTION, SOLUTION INTRAVENOUS PRN
Status: DISCONTINUED | OUTPATIENT
Start: 2022-07-21 | End: 2022-07-26 | Stop reason: HOSPADM

## 2022-07-21 RX ADMIN — SODIUM CHLORIDE, PRESERVATIVE FREE 10 ML: 5 INJECTION INTRAVENOUS at 20:34

## 2022-07-21 RX ADMIN — METRONIDAZOLE 500 MG: 500 INJECTION, SOLUTION INTRAVENOUS at 00:44

## 2022-07-21 RX ADMIN — MORPHINE SULFATE 2 MG: 2 INJECTION, SOLUTION INTRAMUSCULAR; INTRAVENOUS at 07:24

## 2022-07-21 RX ADMIN — ONDANSETRON 4 MG: 2 INJECTION INTRAMUSCULAR; INTRAVENOUS at 20:39

## 2022-07-21 RX ADMIN — CEFEPIME 2000 MG: 2 INJECTION, POWDER, FOR SOLUTION INTRAVENOUS at 09:29

## 2022-07-21 RX ADMIN — MORPHINE SULFATE 2 MG: 2 INJECTION, SOLUTION INTRAMUSCULAR; INTRAVENOUS at 18:27

## 2022-07-21 RX ADMIN — SODIUM CHLORIDE, PRESERVATIVE FREE 10 ML: 5 INJECTION INTRAVENOUS at 20:33

## 2022-07-21 RX ADMIN — SODIUM CHLORIDE, PRESERVATIVE FREE 10 ML: 5 INJECTION INTRAVENOUS at 07:24

## 2022-07-21 RX ADMIN — METRONIDAZOLE 500 MG: 500 TABLET ORAL at 20:33

## 2022-07-21 RX ADMIN — ONDANSETRON 4 MG: 2 INJECTION INTRAMUSCULAR; INTRAVENOUS at 07:24

## 2022-07-21 RX ADMIN — PANTOPRAZOLE SODIUM 40 MG: 40 INJECTION, POWDER, FOR SOLUTION INTRAVENOUS at 09:27

## 2022-07-21 RX ADMIN — ACETAMINOPHEN 650 MG: 325 TABLET ORAL at 06:40

## 2022-07-21 RX ADMIN — CEFEPIME 2000 MG: 2 INJECTION, POWDER, FOR SOLUTION INTRAVENOUS at 16:11

## 2022-07-21 RX ADMIN — POTASSIUM CHLORIDE, DEXTROSE MONOHYDRATE AND SODIUM CHLORIDE: 150; 5; 450 INJECTION, SOLUTION INTRAVENOUS at 06:31

## 2022-07-21 RX ADMIN — METRONIDAZOLE 500 MG: 500 INJECTION, SOLUTION INTRAVENOUS at 09:32

## 2022-07-21 RX ADMIN — METRONIDAZOLE 500 MG: 500 TABLET ORAL at 16:14

## 2022-07-21 RX ADMIN — CEFEPIME 2000 MG: 2 INJECTION, POWDER, FOR SOLUTION INTRAVENOUS at 00:43

## 2022-07-21 ASSESSMENT — PAIN DESCRIPTION - LOCATION
LOCATION: BACK;HIP
LOCATION: BACK
LOCATION: BACK;HIP
LOCATION: BACK

## 2022-07-21 ASSESSMENT — PAIN DESCRIPTION - DESCRIPTORS
DESCRIPTORS: ACHING
DESCRIPTORS: ACHING
DESCRIPTORS: DISCOMFORT

## 2022-07-21 ASSESSMENT — PAIN SCALES - GENERAL
PAINLEVEL_OUTOF10: 1
PAINLEVEL_OUTOF10: 7
PAINLEVEL_OUTOF10: 9
PAINLEVEL_OUTOF10: 8
PAINLEVEL_OUTOF10: 2

## 2022-07-21 ASSESSMENT — PAIN - FUNCTIONAL ASSESSMENT
PAIN_FUNCTIONAL_ASSESSMENT: ACTIVITIES ARE NOT PREVENTED
PAIN_FUNCTIONAL_ASSESSMENT: PREVENTS OR INTERFERES SOME ACTIVE ACTIVITIES AND ADLS
PAIN_FUNCTIONAL_ASSESSMENT: ACTIVITIES ARE NOT PREVENTED

## 2022-07-21 NOTE — PROGRESS NOTES
[address] [place] [address] [facility]  Progress Note    SUBJECTIVE:  CC: abdominal pain    Patient is tolerating medications. No reported adverse drug reactions. Up walking around room, complaining of abdominal tenderness, nausea, vomiting and diarrhea  States it is painful to have a bowel movement  Does not have much of an appetite    Review of systems:  As stated above in the chief complaint, otherwise negative. Medications:  Scheduled Meds:   sodium chloride flush  5-40 mL IntraVENous 2 times per day    enoxaparin  40 mg SubCUTAneous Daily    pantoprazole  40 mg IntraVENous Daily    cefepime  2,000 mg IntraVENous Q8H    metroNIDAZOLE  500 mg IntraVENous Q8H     Continuous Infusions:   dextrose 5% and 0.45% NaCl with KCl 20 mEq 50 mL/hr at 22 1337    sodium chloride       PRN Meds:iopamidol, sodium chloride flush, sodium chloride, ondansetron **OR** ondansetron, polyethylene glycol, acetaminophen **OR** acetaminophen, morphine    OBJECTIVE:  /60   Pulse 56   Temp 98.7 °F (37.1 °C) (Oral)   Resp 18   Ht 5' 6\" (1.676 m)   Wt 214 lb 8.1 oz (97.3 kg)   SpO2 94%   BMI 34.62 kg/m²   Temp  Av °F (36.7 °C)  Min: 97.6 °F (36.4 °C)  Max: 98.7 °F (37.1 °C)  Constitutional: The patient is awake, alert, and oriented. Skin: Warm and dry. No rashes were noted. HEENT: Round and reactive pupils. Moist mucous membranes. No ulcerations or thrush. Neck: Supple to movements. Chest: No use of accessory muscles to breathe. Symmetrical expansion. No wheezing, crackles or rhonchi. Cardiovascular: S1 and S2 are rhythmic and regular. No murmurs appreciated. Abdomen: Positive bowel sounds to auscultation. Benign to palpation. No masses felt. +diffuse tenderness  Extremities: No clubbing, no cyanosis, no edema.   Lines: peripheral    Laboratory and Tests Review:  Lab Results   Component Value Date    WBC 5.8 2022    WBC 5.7 2022    HGB 11.6 2022    HCT 34.7 2022 MCV 94.3 07/21/2022     07/21/2022     Lab Results   Component Value Date    NEUTROABS 4.01 07/21/2022    NEUTROABS 4.13 07/20/2022     No results found for: CRPHS  Lab Results   Component Value Date    ALT 9 07/21/2022    AST 14 07/21/2022    ALKPHOS 78 07/21/2022    BILITOT <0.2 07/21/2022     Lab Results   Component Value Date/Time     07/21/2022 04:52 AM    K 3.9 07/21/2022 04:52 AM     07/21/2022 04:52 AM    CO2 22 07/21/2022 04:52 AM    BUN 9 07/21/2022 04:52 AM    CREATININE 0.7 07/21/2022 04:52 AM    CREATININE 0.7 07/20/2022 02:35 PM    GFRAA >60 07/21/2022 04:52 AM    LABGLOM >60 07/21/2022 04:52 AM    GLUCOSE 92 07/21/2022 04:52 AM    PROT 5.9 07/21/2022 04:52 AM    LABALBU 3.1 07/21/2022 04:52 AM    CALCIUM 8.6 07/21/2022 04:52 AM    BILITOT <0.2 07/21/2022 04:52 AM    ALKPHOS 78 07/21/2022 04:52 AM    AST 14 07/21/2022 04:52 AM    ALT 9 07/21/2022 04:52 AM     No results found for: CRP  No results found for: [address]  Radiology:      Microbiology:   Blood Culture 7/20/22: so far negative    ASSESSMENT:  Sigmoid diverticulitis with abscess between rectum/bladder: reviewed surgery notes: IR cannot drain it. Psoriasis on Mtx: last dose last Saturday  Immunocompromised host:     PLAN:  Continue IV cefepime 2gr q 8 and flagyl 500 q8 switched IV to PO   PICC line ordered  Planning on 3 weeks of antibiotics and repeat CT abdomen/pelvis  Monitor labs    JEFF Hussein CNP  2:10 PM  7/21/2022     Pt seen and examined. Above discussed agree with advanced practice nurse. Labs, cultures, and radiographs reviewed. Face to Face encounter occurred. Changes made as necessary. Recommend to hold MTX until abscess clears: advised the patient to call her dermatologist. And discussed with case management. Pt has lot of help at home.     Clay Brown MD

## 2022-07-21 NOTE — PROGRESS NOTES
GENERAL SURGERY  DAILY PROGRESS NOTE  7/21/2022    No chief complaint on file. Subjective:  Feeling better this morning. No n/v.  Nothing to drain per IR    Objective:  /60   Pulse 56   Temp 98.7 °F (37.1 °C) (Oral)   Resp 18   Ht 5' 6\" (1.676 m)   Wt 214 lb 8.1 oz (97.3 kg)   SpO2 94%   BMI 34.62 kg/m²     GENERAL:  Laying in bed, awake, alert, cooperative, no apparent distress  HEAD: Normocephalic, atraumatic  EYES: No sclera icterus, pupils equal  LUNGS:  No increased work of breathing  CARDIOVASCULAR:  RR  ABDOMEN:  Soft, mild LLQ tenderness, non-distended  EXTREMITIES: No edema or swelling  SKIN: Warm and dry    Assessment/Plan:  36 y.o. female with sigmoid diverticulitis     Continue antibiotics  Ok for clear liquids  Monitor abdominal exam closely    Electronically signed by Stefano High MD on 7/21/2022 at 1:48 PM

## 2022-07-21 NOTE — PROGRESS NOTES
[facility] Progress Note    Admitting Date and Time: 7/20/2022 12:10 PM  Admit Dx: Diverticulitis [K57.92]    Subjective:  Patient is being followed for Diverticulitis [K57.92]     Seen sitting in bed with family at bedside and IVs infusing. She is alert and in no apparent distress. Patient complains of generalized abdominal pain that she states is improving. Patient has no other complaints at this time.     ROS: denies fever, chills, cp, sob, n/v, HA unless stated above.      sodium chloride flush  5-40 mL IntraVENous 2 times per day    [Held by provider] enoxaparin  40 mg SubCUTAneous Daily    pantoprazole  40 mg IntraVENous Daily    cefepime  2,000 mg IntraVENous Q8H    metroNIDAZOLE  500 mg IntraVENous Q8H     sodium chloride flush, 5-40 mL, PRN  sodium chloride, , PRN  ondansetron, 4 mg, Q8H PRN   Or  ondansetron, 4 mg, Q6H PRN  polyethylene glycol, 17 g, Daily PRN  acetaminophen, 650 mg, Q6H PRN   Or  acetaminophen, 650 mg, Q6H PRN  morphine, 2 mg, Q4H PRN         Objective:    /60   Pulse 56   Temp 98.7 °F (37.1 °C) (Oral)   Resp 18   Ht 5' 6\" (1.676 m)   Wt 214 lb 8.1 oz (97.3 kg)   SpO2 94%   BMI 34.62 kg/m²     General Appearance: alert and oriented to person, place and time and in no acute distress  Skin: warm and dry  Head: normocephalic and atraumatic  Eyes: pupils equal, round, and reactive to light, extraocular eye movements intact, conjunctivae normal  Neck: neck supple and non tender without mass   Pulmonary/Chest: clear to auscultation bilaterally- no wheezes, rales or rhonchi, normal air movement, no respiratory distress  Cardiovascular: normal rate, normal S1 and S2 and no carotid bruits  Abdomen: soft, non-tender, non-distended, normal bowel sounds, no masses or organomegaly  Extremities: no cyanosis, no clubbing and no edema  Neurologic: no cranial nerve deficit and speech normal        Recent Labs     07/20/22  1435 07/21/22  0452    139   K 3.8 3.9  107   CO2 23 22   BUN 9 9   CREATININE 0.7 0.7   GLUCOSE 110* 92   CALCIUM 8.5* 8.6       Recent Labs     07/20/22  1435 07/21/22  0452   WBC 5.7 5.8   RBC 3.60 3.68   HGB 11.1* 11.6   HCT 33.8* 34.7   MCV 93.9 94.3   MCH 30.8 31.5   MCHC 32.8 33.4   RDW 12.0 12.1    242   MPV 10.6 10.7         Assessment:    Principal Problem:    Diverticulitis  Active Problems:    Diverticulitis of intestine with abscess    Hydronephrosis    Psoriatic arthritis (HCC)    Gastroesophageal reflux disease  Resolved Problems:    * No resolved hospital problems. *      Plan:  Diverticulitis with abscess: c/o abdominal pain since July 3-saw outpatient gastroenterologist was placed on omnicef, CT abdomen at [facility] revealed diverticulitis with abscess patient started on Merrem and transferred to [facility]. Surgery saw patient-IR to attempt to drain abscess. Like patient to f/u colonoscopy in 6 to 8 weeks. Prior IR abdominal abscess appears too small to drain-we will continue Flagyl and cefepime, per ID's note patient requiring antibiotics for 3 weeks then the CT abdomen/pelvis will be repeated. Ok for clear liquid diet. BC pending, UA negative, no leukocytosis-ID and general surgery input appreciated  Left sided hydronephrosis: Likely secondary to inflammatory process from diverticulitis. Creatinine is stable. holding off on stenting at this time. Repeat renal ultrasound in 1 to 2 days post abscess drainage-Urology input appreciated  Psoriatic arthritis/psoriasis: Takes methotrexate at home we will hold off for now  GERD: Continue PPI    In review of the EMR, evaluation, management and diagnosis. Care plan has been discussed with attending. Time spent 21 mins    NOTE: This report was transcribed using voice recognition software. Every effort was made to ensure accuracy; however, inadvertent computerized transcription errors may be present.   Electronically signed by JEFF Murillo CNP on 7/21/2022 at

## 2022-07-21 NOTE — CARE COORDINATION
Introduced my self and provided explanation of CM role to patient. Patient is awake, alert, and aware of current diagnosis and treatment plan including ID and gen surgical consult, IV atb, discharge planning. Patient voices she resides at home with spouse. She adds that she is independent with her adls. Patient is established with a pcp and denies any issue with retail pharmaceutical coverage. She acknowledges ID plan for PICC insertion and IV atb post discharge for a 3 week course of therapy. She is agreeable to [facility] and after choices reviewed selected [facility]. Referral was made via phone and clinical info sent via fax. Will await review and response regarding availability/acceptance. Patient denies other needs at this time. Will follow along with  and assist with discharge planning as necessary. Explained ELOS of 24-48 hours; patient voiced understanding and agreement. Morris Contreras.  Benoit, MSN, RN  [facility] Case Management  [phone]

## 2022-07-22 LAB
ALBUMIN SERPL-MCNC: 3.1 G/DL (ref 3.5–5.2)
ALP BLD-CCNC: 122 U/L (ref 35–104)
ALT SERPL-CCNC: 11 U/L (ref 0–32)
ANION GAP SERPL CALCULATED.3IONS-SCNC: 8 MMOL/L (ref 7–16)
AST SERPL-CCNC: 24 U/L (ref 0–31)
BASOPHILS ABSOLUTE: 0.02 E9/L (ref 0–0.2)
BASOPHILS RELATIVE PERCENT: 0.4 % (ref 0–2)
BILIRUB SERPL-MCNC: <0.2 MG/DL (ref 0–1.2)
BUN BLDV-MCNC: 7 MG/DL (ref 6–20)
CALCIUM SERPL-MCNC: 8.5 MG/DL (ref 8.6–10.2)
CHLORIDE BLD-SCNC: 109 MMOL/L (ref 98–107)
CO2: 23 MMOL/L (ref 22–29)
CREAT SERPL-MCNC: 0.7 MG/DL (ref 0.5–1)
EOSINOPHILS ABSOLUTE: 0.07 E9/L (ref 0.05–0.5)
EOSINOPHILS RELATIVE PERCENT: 1.3 % (ref 0–6)
GFR AFRICAN AMERICAN: >60
GFR NON-AFRICAN AMERICAN: >60 ML/MIN/1.73
GLUCOSE BLD-MCNC: 99 MG/DL (ref 74–99)
HCT VFR BLD CALC: 33.2 % (ref 34–48)
HEMOGLOBIN: 10.8 G/DL (ref 11.5–15.5)
IMMATURE GRANULOCYTES #: 0.01 E9/L
IMMATURE GRANULOCYTES %: 0.2 % (ref 0–5)
LYMPHOCYTES ABSOLUTE: 1.08 E9/L (ref 1.5–4)
LYMPHOCYTES RELATIVE PERCENT: 20.5 % (ref 20–42)
MCH RBC QN AUTO: 30.6 PG (ref 26–35)
MCHC RBC AUTO-ENTMCNC: 32.5 % (ref 32–34.5)
MCV RBC AUTO: 94.1 FL (ref 80–99.9)
MONOCYTES ABSOLUTE: 0.44 E9/L (ref 0.1–0.95)
MONOCYTES RELATIVE PERCENT: 8.3 % (ref 2–12)
NEUTROPHILS ABSOLUTE: 3.65 E9/L (ref 1.8–7.3)
NEUTROPHILS RELATIVE PERCENT: 69.3 % (ref 43–80)
PDW BLD-RTO: 12.1 FL (ref 11.5–15)
PLATELET # BLD: 212 E9/L (ref 130–450)
PMV BLD AUTO: 10.8 FL (ref 7–12)
POTASSIUM SERPL-SCNC: 4.1 MMOL/L (ref 3.5–5)
RBC # BLD: 3.53 E12/L (ref 3.5–5.5)
SODIUM BLD-SCNC: 140 MMOL/L (ref 132–146)
TOTAL PROTEIN: 6 G/DL (ref 6.4–8.3)
WBC # BLD: 5.3 E9/L (ref 4.5–11.5)

## 2022-07-22 PROCEDURE — 99232 SBSQ HOSP IP/OBS MODERATE 35: CPT | Performed by: INTERNAL MEDICINE

## 2022-07-22 PROCEDURE — C9113 INJ PANTOPRAZOLE SODIUM, VIA: HCPCS | Performed by: INTERNAL MEDICINE

## 2022-07-22 PROCEDURE — 6370000000 HC RX 637 (ALT 250 FOR IP): Performed by: STUDENT IN AN ORGANIZED HEALTH CARE EDUCATION/TRAINING PROGRAM

## 2022-07-22 PROCEDURE — 76937 US GUIDE VASCULAR ACCESS: CPT

## 2022-07-22 PROCEDURE — 6360000002 HC RX W HCPCS: Performed by: NURSE PRACTITIONER

## 2022-07-22 PROCEDURE — 2580000003 HC RX 258

## 2022-07-22 PROCEDURE — 85025 COMPLETE CBC W/AUTO DIFF WBC: CPT

## 2022-07-22 PROCEDURE — 99232 SBSQ HOSP IP/OBS MODERATE 35: CPT | Performed by: NURSE PRACTITIONER

## 2022-07-22 PROCEDURE — 6360000002 HC RX W HCPCS: Performed by: STUDENT IN AN ORGANIZED HEALTH CARE EDUCATION/TRAINING PROGRAM

## 2022-07-22 PROCEDURE — 99232 SBSQ HOSP IP/OBS MODERATE 35: CPT | Performed by: SURGERY

## 2022-07-22 PROCEDURE — 2500000003 HC RX 250 WO HCPCS: Performed by: STUDENT IN AN ORGANIZED HEALTH CARE EDUCATION/TRAINING PROGRAM

## 2022-07-22 PROCEDURE — 2500000003 HC RX 250 WO HCPCS

## 2022-07-22 PROCEDURE — 02HV33Z INSERTION OF INFUSION DEVICE INTO SUPERIOR VENA CAVA, PERCUTANEOUS APPROACH: ICD-10-PCS | Performed by: INTERNAL MEDICINE

## 2022-07-22 PROCEDURE — 36569 INSJ PICC 5 YR+ W/O IMAGING: CPT

## 2022-07-22 PROCEDURE — 2060000000 HC ICU INTERMEDIATE R&B

## 2022-07-22 PROCEDURE — 36415 COLL VENOUS BLD VENIPUNCTURE: CPT

## 2022-07-22 PROCEDURE — 2580000003 HC RX 258: Performed by: STUDENT IN AN ORGANIZED HEALTH CARE EDUCATION/TRAINING PROGRAM

## 2022-07-22 PROCEDURE — 6360000002 HC RX W HCPCS: Performed by: INTERNAL MEDICINE

## 2022-07-22 PROCEDURE — 80053 COMPREHEN METABOLIC PANEL: CPT

## 2022-07-22 PROCEDURE — C1751 CATH, INF, PER/CENT/MIDLINE: HCPCS

## 2022-07-22 RX ORDER — METRONIDAZOLE 500 MG/1
500 TABLET ORAL EVERY 8 HOURS SCHEDULED
Qty: 63 TABLET | Refills: 0 | Status: SHIPPED | OUTPATIENT
Start: 2022-07-22 | End: 2022-07-24 | Stop reason: SDUPTHER

## 2022-07-22 RX ADMIN — Medication 20 ML: at 09:34

## 2022-07-22 RX ADMIN — METRONIDAZOLE 500 MG: 500 TABLET ORAL at 21:00

## 2022-07-22 RX ADMIN — CEFEPIME 2000 MG: 2 INJECTION, POWDER, FOR SOLUTION INTRAVENOUS at 16:46

## 2022-07-22 RX ADMIN — CEFEPIME 2000 MG: 2 INJECTION, POWDER, FOR SOLUTION INTRAVENOUS at 08:06

## 2022-07-22 RX ADMIN — POTASSIUM CHLORIDE, DEXTROSE MONOHYDRATE AND SODIUM CHLORIDE: 150; 5; 450 INJECTION, SOLUTION INTRAVENOUS at 00:12

## 2022-07-22 RX ADMIN — Medication 20 ML: at 09:35

## 2022-07-22 RX ADMIN — LIDOCAINE HYDROCHLORIDE 1.5 ML: 10 SOLUTION INTRAVENOUS at 09:30

## 2022-07-22 RX ADMIN — ONDANSETRON 4 MG: 2 INJECTION INTRAMUSCULAR; INTRAVENOUS at 05:28

## 2022-07-22 RX ADMIN — POTASSIUM CHLORIDE, DEXTROSE MONOHYDRATE AND SODIUM CHLORIDE: 150; 5; 450 INJECTION, SOLUTION INTRAVENOUS at 10:08

## 2022-07-22 RX ADMIN — PANTOPRAZOLE SODIUM 40 MG: 40 INJECTION, POWDER, FOR SOLUTION INTRAVENOUS at 07:59

## 2022-07-22 RX ADMIN — CEFEPIME 2000 MG: 2 INJECTION, POWDER, FOR SOLUTION INTRAVENOUS at 00:11

## 2022-07-22 RX ADMIN — METRONIDAZOLE 500 MG: 500 TABLET ORAL at 05:28

## 2022-07-22 RX ADMIN — MORPHINE SULFATE 2 MG: 2 INJECTION, SOLUTION INTRAMUSCULAR; INTRAVENOUS at 07:59

## 2022-07-22 RX ADMIN — METRONIDAZOLE 500 MG: 500 TABLET ORAL at 14:07

## 2022-07-22 RX ADMIN — MORPHINE SULFATE 2 MG: 2 INJECTION, SOLUTION INTRAMUSCULAR; INTRAVENOUS at 19:57

## 2022-07-22 ASSESSMENT — PAIN DESCRIPTION - DESCRIPTORS
DESCRIPTORS: ACHING;DISCOMFORT;SHOOTING
DESCRIPTORS: ACHING;DISCOMFORT

## 2022-07-22 ASSESSMENT — PAIN SCALES - GENERAL
PAINLEVEL_OUTOF10: 3
PAINLEVEL_OUTOF10: 7
PAINLEVEL_OUTOF10: 7
PAINLEVEL_OUTOF10: 0
PAINLEVEL_OUTOF10: 0

## 2022-07-22 ASSESSMENT — PAIN DESCRIPTION - ONSET
ONSET: ON-GOING
ONSET: ON-GOING

## 2022-07-22 ASSESSMENT — PAIN DESCRIPTION - ORIENTATION: ORIENTATION: RIGHT;LEFT;LOWER

## 2022-07-22 ASSESSMENT — PAIN DESCRIPTION - FREQUENCY
FREQUENCY: CONTINUOUS
FREQUENCY: CONTINUOUS

## 2022-07-22 ASSESSMENT — PAIN DESCRIPTION - PAIN TYPE
TYPE: ACUTE PAIN
TYPE: ACUTE PAIN

## 2022-07-22 ASSESSMENT — PAIN DESCRIPTION - LOCATION
LOCATION: BACK
LOCATION: BACK

## 2022-07-22 ASSESSMENT — PAIN - FUNCTIONAL ASSESSMENT
PAIN_FUNCTIONAL_ASSESSMENT: PREVENTS OR INTERFERES SOME ACTIVE ACTIVITIES AND ADLS
PAIN_FUNCTIONAL_ASSESSMENT: PREVENTS OR INTERFERES SOME ACTIVE ACTIVITIES AND ADLS

## 2022-07-22 NOTE — PROGRESS NOTES
GENERAL SURGERY  DAILY PROGRESS NOTE  7/22/2022    CC: abdominal pain      Subjective:  Patient with improvement in her pain. She had one episode of nausea and vomiting yesterday. She is passing gas and having bowel movements. Objective:  /61   Pulse 63   Temp 97.3 °F (36.3 °C) (Temporal)   Resp 16   Ht 5' 6\" (1.676 m)   Wt 215 lb 9.8 oz (97.8 kg)   SpO2 99%   BMI 34.80 kg/m²     GENERAL:  Laying in bed, awake, alert, cooperative, no apparent distress  HEAD: Normocephalic, atraumatic  EYES: No sclera icterus, pupils equal  LUNGS:  No increased work of breathing  CARDIOVASCULAR:  RR  ABDOMEN:  Soft, mild LLQ tenderness, non-distended  EXTREMITIES: No edema or swelling  SKIN: Warm and dry    Assessment/Plan:  36 y.o. female with sigmoid diverticulitis     Continue antibiotics per ID- planning for PICC placement. FLD  Monitor abdominal exam closely  Will need outpatient colonoscopy    Electronically signed by Angel Ferrer MD on 7/22/2022 at 6:51 AM  Electronically signed by Sourav Marshall MD on 7/22/2022 at 7:51 AM    Attending Physician Statement:    Chief Complaint: diverticulitis    I have examined the patient and performed the key aspects of physical exam, reviewed the record (including all pertinent and new radiology images and laboratory findings), and discussed the case with the surgical team.  I agree with the assessment and plan with the following additions, corrections, and changes. 14pt review of symptoms completed and negative except as mentioned. Still with some discomfort, but says each day is improving. Bowels more formed today. Full liquids today. Cont IV abx. Emory Steinberg MD  07/22/22  5:00 PM    NOTE: This report, in part or full, may have been transcribed using voice recognition software. Every effort was made to ensure accuracy; however, inadvertent computerized transcription errors may be present.  Please excuse any transcriptional grammatical or spelling errors that may have escaped my editorial review.

## 2022-07-22 NOTE — PROCEDURES
PICC   Catheter insertion date: 7/22/2022     Product Number:  [id number]   Lot No: [id number]   Gauge: 17X2   Lumen: DOUBLE/ 5.5 Greek   R Basilic    Vein Diameter: 0.52CM   Arm circumference at insertion site: 32CM   Catheter Length: 41CM   Internal Length: 40CM   External Catheter Length: 1CM   Ultrasound Used: YES  VPS [place] confirms PICC tip is placed in the lower 1/3 of the SVC or at the Cavoatrial junction. Floor nurse notified PICC is okay to use.    : Basil Casanova RN Normal vision: sees adequately in most situations; can see medication labels, newsprint

## 2022-07-22 NOTE — PROGRESS NOTES
7/22/2022 12:37 PM  Service: Urology  Group: [facility] (Les/Estefani/Sergey)    Juanell Soulier  [id number]    Subjective: Feeling ok   Some abdominal pain with BM  Left groin and bilateral flank   Urinating well per pt no  symptoms  Afebrile      Review of Systems  Constitutional: no chills or sweats  Respiratory: negative for cough and shortness of breath  Cardiovascular: negative for chest pain  Gastrointestinal: positive for abdominal pain  Dermatologic: negative  Hematologic/lymphatic: negative  Musculoskeletal:negative  Neurological: negative for seizures and tremors  Endocrine: negative  Psychiatric: negative  : See above    Scheduled Meds:   sodium chloride flush  5-40 mL IntraVENous 2 times per day    heparin flush  3 mL IntraVENous 2 times per day    metroNIDAZOLE  500 mg Oral 3 times per day    sodium chloride flush  5-40 mL IntraVENous 2 times per day    enoxaparin  40 mg SubCUTAneous Daily    pantoprazole  40 mg IntraVENous Daily    cefepime  2,000 mg IntraVENous Q8H       Objective:  Vitals:    07/22/22 0713   BP: 121/67   Pulse: 61   Resp: 16   Temp: 97.9 °F (36.6 °C)   SpO2: 96%         Allergies: Bee venom and Pcn [penicillins]    General Appearance: alert and oriented to person, place and time and in no acute distress  Skin: no rash or erythema  Head: normocephalic and atraumatic  Pulmonary/Chest: normal air movement, no respiratory distress   Abdomen: soft, non-tender, non-distended   Genitourinary: no lane catheter  Extremities: no cyanosis, clubbing or edema     Labs:     Recent Labs     07/22/22  0418      K 4.1   *   CO2 23   BUN 7   CREATININE 0.7   GLUCOSE 99   CALCIUM 8.5*       Lab Results   Component Value Date/Time    HGB 10.8 07/22/2022 04:18 AM    HCT 33.2 07/22/2022 04:18 AM         Assessment/Plan:  Left hydronephrosis and hydroureter  Pt did not have IR drainage of abscess (too small per IR)  She is being treated with IV antibiotics for her Diverticulitis  Will get US on Monday to re eval her left hydro   She has no symptoms  Her creatinine is 0.7  Will hold on stent      Nickie Simon, APRN - CNP   [facility]  Urology    Agree with above  Seen and examined  Agree with the plan and treatment    [facility], DO

## 2022-07-22 NOTE — PROGRESS NOTES
A flagyl was sent to us, came back refill too soon spoke with patient she does have this at home prescribed previously from GI md will send this to her [facility] in [place] as directed

## 2022-07-22 NOTE — PROGRESS NOTES
[address] [place] [address] [facility]  Progress Note    SUBJECTIVE:  CC: abdominal pain    Patient is tolerating medications. No reported adverse drug reactions. Laying in bed, feeling a little better  Still having abdominal tenderness, nausea and diarrhea  Says that Flagyl makes her nauseous   Does not have much of an appetite    Review of systems:  As stated above in the chief complaint, otherwise negative. Medications:  Scheduled Meds:   sodium chloride flush  5-40 mL IntraVENous 2 times per day    heparin flush  3 mL IntraVENous 2 times per day    metroNIDAZOLE  500 mg Oral 3 times per day    sodium chloride flush  5-40 mL IntraVENous 2 times per day    enoxaparin  40 mg SubCUTAneous Daily    pantoprazole  40 mg IntraVENous Daily    cefepime  2,000 mg IntraVENous Q8H     Continuous Infusions:   dextrose 5% and 0.45% NaCl with KCl 20 mEq 50 mL/hr at 22 1008    sodium chloride      sodium chloride       PRN Meds:iopamidol, sodium chloride flush, sodium chloride, heparin flush, sodium chloride flush, sodium chloride, ondansetron **OR** ondansetron, polyethylene glycol, acetaminophen **OR** acetaminophen, morphine    OBJECTIVE:  /67   Pulse 61   Temp 97.9 °F (36.6 °C) (Oral)   Resp 16   Ht 5' 6\" (1.676 m)   Wt 215 lb 9.8 oz (97.8 kg)   SpO2 96%   BMI 34.80 kg/m²   Temp  Av.7 °F (36.5 °C)  Min: 97.3 °F (36.3 °C)  Max: 98.1 °F (36.7 °C)  Constitutional: The patient is awake, alert, and oriented. Skin: Warm and dry. No rashes were noted. HEENT: Round and reactive pupils. Moist mucous membranes. No ulcerations or thrush. Neck: Supple to movements. Chest: No use of accessory muscles to breathe. Symmetrical expansion. No wheezing, crackles or rhonchi. Cardiovascular: S1 and S2 are rhythmic and regular. No murmurs appreciated. Abdomen: Positive bowel sounds to auscultation. Benign to palpation. No masses felt.  +diffuse tenderness  Extremities: No clubbing, no cyanosis, no edema. Lines: R Basilic PICC  14HC    Laboratory and Tests Review:  Lab Results   Component Value Date    WBC 5.3 2022    WBC 5.8 2022    WBC 5.7 2022    HGB 10.8 (L) 2022    HCT 33.2 (L) 2022    MCV 94.1 2022     2022     Lab Results   Component Value Date    NEUTROABS 3.65 2022    NEUTROABS 4.01 2022    NEUTROABS 4.13 2022     No results found for: CRPHS  Lab Results   Component Value Date    ALT 11 2022    AST 24 2022    ALKPHOS 122 (H) 2022    BILITOT <0.2 2022     Lab Results   Component Value Date/Time     2022 04:18 AM    K 4.1 2022 04:18 AM     2022 04:18 AM    CO2 23 2022 04:18 AM    BUN 7 2022 04:18 AM    CREATININE 0.7 2022 04:18 AM    CREATININE 0.7 2022 04:52 AM    CREATININE 0.7 2022 02:35 PM    GFRAA >60 2022 04:18 AM    LABGLOM >60 2022 04:18 AM    GLUCOSE 99 2022 04:18 AM    PROT 6.0 2022 04:18 AM    LABALBU 3.1 2022 04:18 AM    CALCIUM 8.5 2022 04:18 AM    BILITOT <0.2 2022 04:18 AM    ALKPHOS 122 2022 04:18 AM    AST 24 2022 04:18 AM    ALT 11 2022 04:18 AM     No results found for: CRP  No results found for: [address]  Radiology:      Microbiology:   Blood Culture 22: so far negative    ASSESSMENT:  Sigmoid diverticulitis with abscess between rectum/bladder: reviewed surgery notes: IR cannot drain it. Psoriasis on Mtx: last dose last Saturday  Immunocompromised host:     PLAN:  Continue IV Cefepime 2gr q 8 and oral Flagyl 500 q8   Planning on 3 weeks of antibiotics and repeat CT abdomen/pelvis  Recommend to hold MTX until abscess clears: advised the patient to call her dermatologist  [address] [place] [address] with [employer] - can't see patient until 22    JEFF Montiel CNP  1:01 PM  2022     Pt seen and examined.  Above discussed agree with advanced practice nurse. Labs, cultures, and radiographs reviewed. Face to Face encounter occurred. Changes made as necessary.      Sofiya Berg MD

## 2022-07-22 NOTE — PROGRESS NOTES
[facility] Progress Note    Admitting Date and Time: 7/20/2022 12:10 PM  Admit Dx: Diverticulitis [K57.92]    Subjective:  Patient is being followed for Diverticulitis [K57.92]     Patient is alert and in no acute distress. She has no complaints of pain at this time.      ROS: denies fever, chills, cp, sob, n/v, HA unless stated above.      lidocaine PF  5 mL IntraDERmal Once    sodium chloride flush  5-40 mL IntraVENous 2 times per day    heparin flush  3 mL IntraVENous 2 times per day    metroNIDAZOLE  500 mg Oral 3 times per day    sodium chloride flush  5-40 mL IntraVENous 2 times per day    enoxaparin  40 mg SubCUTAneous Daily    pantoprazole  40 mg IntraVENous Daily    cefepime  2,000 mg IntraVENous Q8H     iopamidol, 60 mL, ONCE PRN  sodium chloride flush, 5-40 mL, PRN  sodium chloride, , PRN  heparin flush, 3 mL, PRN  sodium chloride flush, 5-40 mL, PRN  sodium chloride, , PRN  ondansetron, 4 mg, Q8H PRN   Or  ondansetron, 4 mg, Q6H PRN  polyethylene glycol, 17 g, Daily PRN  acetaminophen, 650 mg, Q6H PRN   Or  acetaminophen, 650 mg, Q6H PRN  morphine, 2 mg, Q4H PRN       Objective:    /67   Pulse 61   Temp 97.9 °F (36.6 °C) (Oral)   Resp 16   Ht 5' 6\" (1.676 m)   Wt 215 lb 9.8 oz (97.8 kg)   SpO2 96%   BMI 34.80 kg/m²     General Appearance: alert and oriented to person, place and time and in no acute distress  Skin: warm and dry  Head: normocephalic and atraumatic  Eyes: pupils equal, round, and reactive to light, extraocular eye movements intact, conjunctivae normal  Neck: neck supple and non tender without mass   Pulmonary/Chest: clear to auscultation bilaterally- no wheezes, rales or rhonchi, normal air movement, no respiratory distress  Cardiovascular: normal rate, normal S1 and S2 and no carotid bruits  Abdomen: soft, non-tender, non-distended, normal bowel sounds, no masses or organomegaly  Extremities: no cyanosis, no clubbing and no edema  Neurologic: no cranial nerve deficit and speech normal        Recent Labs     07/20/22  1435 07/21/22  0452 07/22/22  0418    139 140   K 3.8 3.9 4.1    107 109*   CO2 23 22 23   BUN 9 9 7   CREATININE 0.7 0.7 0.7   GLUCOSE 110* 92 99   CALCIUM 8.5* 8.6 8.5*         Recent Labs     07/20/22  1435 07/21/22  0452 07/22/22  0418   WBC 5.7 5.8 5.3   RBC 3.60 3.68 3.53   HGB 11.1* 11.6 10.8*   HCT 33.8* 34.7 33.2*   MCV 93.9 94.3 94.1   MCH 30.8 31.5 30.6   MCHC 32.8 33.4 32.5   RDW 12.0 12.1 12.1    242 212   MPV 10.6 10.7 10.8           Assessment:    Principal Problem:    Diverticulitis  Active Problems:    Diverticulitis of intestine with abscess    Hydronephrosis    Psoriatic arthritis (HCC)    Gastroesophageal reflux disease  Resolved Problems:    * No resolved hospital problems. *      Plan:  Diverticulitis with abscess: c/o abdominal pain since July 3-saw outpatient gastroenterologist was placed on omnicef, CT abdomen at THE [facility] revealed diverticulitis with abscess patient started on Merrem and transferred to [facility]. Per Surgery/IR abdominal abscess appears too small to drain- Flagyl switched to PO, continue cefepime, per ID's note patient requiring antibiotics for 3 weeks then the CT abdomen/pelvis will be repeated. PICC placement for outpatient abx. Transitioned to full liquid diet. BC no growth so far, UA negative, no leukocytosis-ID and general surgery input appreciated  Left sided hydronephrosis: Likely secondary to inflammatory process from diverticulitis. Creatinine remains stable. holding off on stenting at this time. Repeat renal US on Monday-Urology input appreciated  Psoriatic arthritis/psoriasis: Takes methotrexate at home we will hold off for now  GERD: Continue PPI    In review of the EMR, evaluation, management and diagnosis. Care plan has been discussed with attending. Time spent 20 mins    NOTE: This report was transcribed using voice recognition software.  Every effort was made to ensure accuracy; however, inadvertent computerized transcription errors may be present.   Electronically signed by JEFF Guzmán CNP on 7/22/2022 at 8:22 AM

## 2022-07-22 NOTE — DISCHARGE INSTRUCTIONS
[address] [place] [address] [facility]  ([address])  [address] [facility]  [facility] 80  [place], [address]  Phone [phone]   Fax [phone]    Caio Medrano. Jody Kay MD, MD Clementina Licona MD Munir P. Eustace Lone, MD Juan Carlos Smart, MD Elaine Maurer, MD Ese Molina, MD Hannah Bejarano, CNS  Balwinder Fournier, CNS     Bette Mata APRN, CNS  Kristal Cuenca, CNP     April Cates, CNS               STANDING ORDERS (ID Protocol)     Visiting nurses are to write the Primary Care Physician and their own call back number on all laboratory requisition forms. Abnormal lab values are called to the physician by the nurse and NOT by the laboratory. Fax all labs to the office in a timely manner, during office hours. All faxes should include nurses name and call back number. Vascular Access Devices or VADs (TLC, PICC, Midline, etc) will be replaced as necessary. Draw all blood work from VADs, except for drug levels. If unable to access a VAD, insert a peripheral catheter temporarily. Contact the Primary Care Physician or [facility] office for surgical referral.  Use tPA (Deneise Her) as per agency protocol to restore patency of VAD. Remove VAD upon completion of IV antibiotics, unless otherwise specified by the ordering physician. If VAD cannot be removed, schedule appointment at office for removal.  Notify ordering physician or office if patient requires admission to the hospital with reason for admission. Discontinue all blood work upon completion of IV antibiotics, unless otherwise specified by ordering physician. Notify ordering physician if the patient does not receive the scheduled antibiotic for 24 hours or more. The Pharmacy and [address] [place] [address] may adjust the timing of the infusion and blood work to accommodate the patients home care conditions. When PICC or VAD is to be removed, documentation of length of inserted PICC.  PICC or VAD length is to correlate with inserted length and sent to physician at the time of removal.  Give the patient a list of antibiotics being administered with:  Drug name  Route  Frequency  Start/Stop date      ROUTINE LABS TO BE DRAWN/ORDERED:  Twice weekly (preferably every Monday & Thursday):  CMP  Complete Blood Count with differential (CBC with dif)  Once weekly:  C-Reactive Protein (not high sensitivity CRP)  Erythrocyte/Westergren Sedimentation Rate (WSR or ESR)  Total CPK for patients on Daptomycin (Cubicin®). Obtain CPK more often if the patient is experiencing muscle weakness or myalgias. Clinical Pharmacist is to adjust Vancomycin and Aminoglycosides. Clinical pharmacist is  encouraged to follow: \"Therapeutic Monitoring of Vancomycin for Serious Methicillin-resistant Staphylococcus aureus Infections: A Revised Consensus Guideline and Review by the American Society of Health-system Pharmacists, the Infectious Diseases Society of [facility] Laura Moncada, the Pediatric Infectious Diseases Society, and the Society of Infectious Diseases Pharmacists\" (https://doi.org/10.1093/betty/dyms362). If a clinical calculator is not available, clinical pharmacist is to follow the orders below:  Draw Vancomycin trough 30 minutes before the third dose  After starting drug, or   After the dosing or interval is changed. If the trough level is between 5 and 20 continue dose as ordered. Draw troughs twice weekly thereafter until troughs are stable (i.e. until trough is between 10 and 20 mcg/ml for two consecutive laboratory values). Once stable check troughs once weekly or every third dose. Please do not call physician unless the trough is < 5 or >20. If the trough is <20 continue dosing as ordered. If the trough is >20 call the office for further orders. Do not hold the dose while waiting for the trough result.   Amingoglycosides (e.g. Gentamicin, Tobramycin and Amikacin) peaks and troughs should be drawn twice weekly (preferably on Mondays and Thursdays) or every third dose. Aminoglycoside peaks are not to be drawn if patient on Once-Daily Dosing (ODD). Call physician or office if the trough is:     >1 for gentamicin,   >2 for tobramycin, or   >5 for amikacin  When clinically indicated obtain:  Urine culture. If the patient has a fever with purulent drainage from [place] or suprapubic catheter, or foul smelling urine. Do not irrigate a clogged Greenwood catheter. Replace it. Blood cultures and Wound Gram stain with culture & sensitivity. If the patient has a fever or increasing drainage or foul odor from a wound. Notify the treating physician in a timely manner  Stool specimen. If diarrhea occurs while on antibiotics, send stools for C. difficile and WBCs. When a drug is discontinued due to a low white blood cell count (WBCs) draw two consecutive CBC with differential and BUN, Ceatinine. ALLERGIC OR ADVERSE REACTIONS TO MEDICATIONS  Mild reaction: (itching, with or without rash):  Administer Benadryl 50mg po x 1, then 25mg po q6h prn. Notify office or physician in a timely matter. Moderate reaction (itching with or without rash and/or wheezing, dyspnea, itchy throat):  Administer Benadryl 50 mg IV push x 1. Notify office or physician in a timely manner. Severe reaction i.e. Anaphylaxis (wheezing or stidor, sudden rash, lightheadedness, hypotension):  Administer epinephrine subcutaneous 0.3mg (1:1000) x 1 dose. May repeat twice every 5 minutes if needed. Call EMS and notify office or physician immediately. For all above reactions: administer Solu-Cortef 100mg slow IV push x 1. VASCULAR ACCESS DRESSING CHANGE PROTOCOL  Cleanse insertion site with ChlorPrep® or equivalent three times. Secure catheter with Steri-Strips®, Bone®, or equivalent securing device. Apply Opsite® 3000 or equivalent transparent dressing. Change dressing twice weekly, maintaining sterile technique.  If there is a BioPatch®, SilverSite® or equivalent, change once weekly only or as needed. FOLLOW-UP VISITS  Nursing staff should call the office during business hours to schedule a follow-up appointment once the patient is admitted to the service or facility. Every effort should be made to have patient follow-up within 2 weeks of discharge. Exception is made for ventilator-dependent patients. Continue IV antibiotic therapy until patient is seen in the office or unless specific stop date is noted on the original order or unless otherwise ordered by physician. Call office to ensure stop date is correct before stopping antibiotics.          Reji Torres MD

## 2022-07-23 LAB
ALBUMIN SERPL-MCNC: 3 G/DL (ref 3.5–5.2)
ALP BLD-CCNC: 145 U/L (ref 35–104)
ALT SERPL-CCNC: 11 U/L (ref 0–32)
ANION GAP SERPL CALCULATED.3IONS-SCNC: 6 MMOL/L (ref 7–16)
AST SERPL-CCNC: 23 U/L (ref 0–31)
BASOPHILS ABSOLUTE: 0.04 E9/L (ref 0–0.2)
BASOPHILS RELATIVE PERCENT: 0.8 % (ref 0–2)
BILIRUB SERPL-MCNC: <0.2 MG/DL (ref 0–1.2)
BUN BLDV-MCNC: 5 MG/DL (ref 6–20)
CALCIUM SERPL-MCNC: 8.2 MG/DL (ref 8.6–10.2)
CHLORIDE BLD-SCNC: 107 MMOL/L (ref 98–107)
CO2: 23 MMOL/L (ref 22–29)
CREAT SERPL-MCNC: 0.7 MG/DL (ref 0.5–1)
EOSINOPHILS ABSOLUTE: 0.07 E9/L (ref 0.05–0.5)
EOSINOPHILS RELATIVE PERCENT: 1.4 % (ref 0–6)
GFR AFRICAN AMERICAN: >60
GFR NON-AFRICAN AMERICAN: >60 ML/MIN/1.73
GLUCOSE BLD-MCNC: 232 MG/DL (ref 74–99)
HCT VFR BLD CALC: 33.4 % (ref 34–48)
HEMOGLOBIN: 10.6 G/DL (ref 11.5–15.5)
IMMATURE GRANULOCYTES #: 0.02 E9/L
IMMATURE GRANULOCYTES %: 0.4 % (ref 0–5)
LYMPHOCYTES ABSOLUTE: 1.11 E9/L (ref 1.5–4)
LYMPHOCYTES RELATIVE PERCENT: 23 % (ref 20–42)
MCH RBC QN AUTO: 30.7 PG (ref 26–35)
MCHC RBC AUTO-ENTMCNC: 31.7 % (ref 32–34.5)
MCV RBC AUTO: 96.8 FL (ref 80–99.9)
MONOCYTES ABSOLUTE: 0.5 E9/L (ref 0.1–0.95)
MONOCYTES RELATIVE PERCENT: 10.4 % (ref 2–12)
NEUTROPHILS ABSOLUTE: 3.09 E9/L (ref 1.8–7.3)
NEUTROPHILS RELATIVE PERCENT: 64 % (ref 43–80)
PDW BLD-RTO: 12.1 FL (ref 11.5–15)
PLATELET # BLD: 210 E9/L (ref 130–450)
PMV BLD AUTO: 10.9 FL (ref 7–12)
POTASSIUM SERPL-SCNC: 4.6 MMOL/L (ref 3.5–5)
RBC # BLD: 3.45 E12/L (ref 3.5–5.5)
SODIUM BLD-SCNC: 136 MMOL/L (ref 132–146)
TOTAL PROTEIN: 5.8 G/DL (ref 6.4–8.3)
WBC # BLD: 4.8 E9/L (ref 4.5–11.5)

## 2022-07-23 PROCEDURE — C9113 INJ PANTOPRAZOLE SODIUM, VIA: HCPCS | Performed by: INTERNAL MEDICINE

## 2022-07-23 PROCEDURE — 99222 1ST HOSP IP/OBS MODERATE 55: CPT | Performed by: NURSE PRACTITIONER

## 2022-07-23 PROCEDURE — 6360000002 HC RX W HCPCS

## 2022-07-23 PROCEDURE — 2580000003 HC RX 258

## 2022-07-23 PROCEDURE — 36415 COLL VENOUS BLD VENIPUNCTURE: CPT

## 2022-07-23 PROCEDURE — 6360000002 HC RX W HCPCS: Performed by: STUDENT IN AN ORGANIZED HEALTH CARE EDUCATION/TRAINING PROGRAM

## 2022-07-23 PROCEDURE — 6360000002 HC RX W HCPCS: Performed by: INTERNAL MEDICINE

## 2022-07-23 PROCEDURE — 99232 SBSQ HOSP IP/OBS MODERATE 35: CPT | Performed by: SURGERY

## 2022-07-23 PROCEDURE — 2580000003 HC RX 258: Performed by: STUDENT IN AN ORGANIZED HEALTH CARE EDUCATION/TRAINING PROGRAM

## 2022-07-23 PROCEDURE — 6370000000 HC RX 637 (ALT 250 FOR IP): Performed by: STUDENT IN AN ORGANIZED HEALTH CARE EDUCATION/TRAINING PROGRAM

## 2022-07-23 PROCEDURE — 2060000000 HC ICU INTERMEDIATE R&B

## 2022-07-23 PROCEDURE — 6370000000 HC RX 637 (ALT 250 FOR IP): Performed by: INTERNAL MEDICINE

## 2022-07-23 PROCEDURE — 2500000003 HC RX 250 WO HCPCS: Performed by: STUDENT IN AN ORGANIZED HEALTH CARE EDUCATION/TRAINING PROGRAM

## 2022-07-23 PROCEDURE — 6360000002 HC RX W HCPCS: Performed by: NURSE PRACTITIONER

## 2022-07-23 PROCEDURE — 85025 COMPLETE CBC W/AUTO DIFF WBC: CPT

## 2022-07-23 PROCEDURE — 80053 COMPREHEN METABOLIC PANEL: CPT

## 2022-07-23 PROCEDURE — 99232 SBSQ HOSP IP/OBS MODERATE 35: CPT | Performed by: INTERNAL MEDICINE

## 2022-07-23 RX ORDER — FLUCONAZOLE 100 MG/1
150 TABLET ORAL ONCE
Status: DISCONTINUED | OUTPATIENT
Start: 2022-07-24 | End: 2022-07-23

## 2022-07-23 RX ORDER — FLUCONAZOLE 100 MG/1
150 TABLET ORAL ONCE
Status: DISCONTINUED | OUTPATIENT
Start: 2022-07-23 | End: 2022-07-23

## 2022-07-23 RX ORDER — FLUCONAZOLE 100 MG/1
100 TABLET ORAL DAILY
Status: DISCONTINUED | OUTPATIENT
Start: 2022-07-23 | End: 2022-07-23

## 2022-07-23 RX ORDER — FLUCONAZOLE 100 MG/1
50 TABLET ORAL ONCE
Status: COMPLETED | OUTPATIENT
Start: 2022-07-23 | End: 2022-07-23

## 2022-07-23 RX ADMIN — CEFEPIME 2000 MG: 2 INJECTION, POWDER, FOR SOLUTION INTRAVENOUS at 09:23

## 2022-07-23 RX ADMIN — FLUCONAZOLE 50 MG: 100 TABLET ORAL at 17:57

## 2022-07-23 RX ADMIN — METRONIDAZOLE 500 MG: 500 TABLET ORAL at 15:11

## 2022-07-23 RX ADMIN — MORPHINE SULFATE 2 MG: 2 INJECTION, SOLUTION INTRAMUSCULAR; INTRAVENOUS at 07:18

## 2022-07-23 RX ADMIN — FLUCONAZOLE 100 MG: 100 TABLET ORAL at 17:15

## 2022-07-23 RX ADMIN — SODIUM CHLORIDE, PRESERVATIVE FREE 10 ML: 5 INJECTION INTRAVENOUS at 09:17

## 2022-07-23 RX ADMIN — SODIUM CHLORIDE, PRESERVATIVE FREE 300 UNITS: 5 INJECTION INTRAVENOUS at 09:17

## 2022-07-23 RX ADMIN — POTASSIUM CHLORIDE, DEXTROSE MONOHYDRATE AND SODIUM CHLORIDE: 150; 5; 450 INJECTION, SOLUTION INTRAVENOUS at 05:25

## 2022-07-23 RX ADMIN — PANTOPRAZOLE SODIUM 40 MG: 40 INJECTION, POWDER, FOR SOLUTION INTRAVENOUS at 09:17

## 2022-07-23 RX ADMIN — CEFEPIME 2000 MG: 2 INJECTION, POWDER, FOR SOLUTION INTRAVENOUS at 17:19

## 2022-07-23 RX ADMIN — METRONIDAZOLE 500 MG: 500 TABLET ORAL at 05:25

## 2022-07-23 RX ADMIN — CEFEPIME 2000 MG: 2 INJECTION, POWDER, FOR SOLUTION INTRAVENOUS at 00:51

## 2022-07-23 RX ADMIN — METRONIDAZOLE 500 MG: 500 TABLET ORAL at 21:57

## 2022-07-23 ASSESSMENT — PAIN DESCRIPTION - FREQUENCY: FREQUENCY: INTERMITTENT

## 2022-07-23 ASSESSMENT — PAIN SCALES - GENERAL
PAINLEVEL_OUTOF10: 0
PAINLEVEL_OUTOF10: 6
PAINLEVEL_OUTOF10: 8
PAINLEVEL_OUTOF10: 0
PAINLEVEL_OUTOF10: 0

## 2022-07-23 ASSESSMENT — PAIN DESCRIPTION - DESCRIPTORS: DESCRIPTORS: ACHING;DISCOMFORT;SHOOTING

## 2022-07-23 ASSESSMENT — PAIN - FUNCTIONAL ASSESSMENT: PAIN_FUNCTIONAL_ASSESSMENT: PREVENTS OR INTERFERES SOME ACTIVE ACTIVITIES AND ADLS

## 2022-07-23 ASSESSMENT — PAIN DESCRIPTION - ONSET: ONSET: ON-GOING

## 2022-07-23 ASSESSMENT — PAIN DESCRIPTION - ORIENTATION: ORIENTATION: LOWER;RIGHT;LEFT

## 2022-07-23 ASSESSMENT — PAIN DESCRIPTION - PAIN TYPE: TYPE: ACUTE PAIN

## 2022-07-23 ASSESSMENT — PAIN DESCRIPTION - LOCATION: LOCATION: BACK

## 2022-07-23 NOTE — PROGRESS NOTES
[address] [place] [address] [facility]  [facility]  Progress Note    SUBJECTIVE:  CC: abdominal pain    Patient is tolerating medications. No reported adverse drug reactions. Laying in bed, feeling a lot better  Still has some nausea with Flagyl but more tolerable with food, just doesn't like the taste      Review of systems:  As stated above in the chief complaint, otherwise negative. Medications:  Scheduled Meds:   fluconazole  100 mg Oral Daily    sodium chloride flush  5-40 mL IntraVENous 2 times per day    heparin flush  3 mL IntraVENous 2 times per day    metroNIDAZOLE  500 mg Oral 3 times per day    sodium chloride flush  5-40 mL IntraVENous 2 times per day    enoxaparin  40 mg SubCUTAneous Daily    pantoprazole  40 mg IntraVENous Daily    cefepime  2,000 mg IntraVENous Q8H     Continuous Infusions:   dextrose 5% and 0.45% NaCl with KCl 20 mEq 50 mL/hr at 22 0525    sodium chloride      sodium chloride       PRN Meds:iopamidol, sodium chloride flush, sodium chloride, heparin flush, sodium chloride flush, sodium chloride, ondansetron **OR** ondansetron, polyethylene glycol, acetaminophen **OR** acetaminophen, morphine    OBJECTIVE:  BP (!) 143/79   Pulse 51   Temp 97.6 °F (36.4 °C) (Oral)   Resp 18   Ht 5' 6\" (1.676 m)   Wt 212 lb 11.9 oz (96.5 kg)   SpO2 99%   BMI 34.34 kg/m²   Temp  Av.9 °F (36.6 °C)  Min: 97.6 °F (36.4 °C)  Max: 98.3 °F (36.8 °C)  Constitutional: The patient is awake, alert, and oriented. Skin: Warm and dry. No rashes were noted. HEENT: Round and reactive pupils. Moist mucous membranes. No ulcerations or thrush. Neck: Supple to movements. Chest: No use of accessory muscles to breathe. Symmetrical expansion. No wheezing, crackles or rhonchi. Cardiovascular: S1 and S2 are rhythmic and regular. No murmurs appreciated. Abdomen: Positive bowel sounds to auscultation. Benign to palpation. No masses felt.  +diffuse tenderness  Extremities: No clubbing, no cyanosis, no edema. Lines: R Basilic PICC 6/08/55 04AM    Laboratory and Tests Review:  Lab Results   Component Value Date    WBC 4.8 07/23/2022    WBC 5.3 07/22/2022    WBC 5.8 07/21/2022    HGB 10.6 (L) 07/23/2022    HCT 33.4 (L) 07/23/2022    MCV 96.8 07/23/2022     07/23/2022     Lab Results   Component Value Date    NEUTROABS 3.09 07/23/2022    NEUTROABS 3.65 07/22/2022    NEUTROABS 4.01 07/21/2022     No results found for: CRPHS  Lab Results   Component Value Date    ALT 11 07/23/2022    AST 23 07/23/2022    ALKPHOS 145 (H) 07/23/2022    BILITOT <0.2 07/23/2022     Lab Results   Component Value Date/Time     07/23/2022 05:30 AM    K 4.6 07/23/2022 05:30 AM     07/23/2022 05:30 AM    CO2 23 07/23/2022 05:30 AM    BUN 5 07/23/2022 05:30 AM    CREATININE 0.7 07/23/2022 05:30 AM    CREATININE 0.7 07/22/2022 04:18 AM    CREATININE 0.7 07/21/2022 04:52 AM    GFRAA >60 07/23/2022 05:30 AM    LABGLOM >60 07/23/2022 05:30 AM    GLUCOSE 232 07/23/2022 05:30 AM    PROT 5.8 07/23/2022 05:30 AM    LABALBU 3.0 07/23/2022 05:30 AM    CALCIUM 8.2 07/23/2022 05:30 AM    BILITOT <0.2 07/23/2022 05:30 AM    ALKPHOS 145 07/23/2022 05:30 AM    AST 23 07/23/2022 05:30 AM    ALT 11 07/23/2022 05:30 AM     No results found for: CRP  No results found for: [address]  Radiology:      Microbiology:   Blood Culture 7/20/22: so far negative    ASSESSMENT:  Sigmoid diverticulitis with abscess between rectum/bladder: reviewed surgery notes: IR cannot drain it. Psoriasis on Mtx: last dose last Saturday  Immunocompromised host:     PLAN:  Continue IV Cefepime 2gr q 8 and oral Flagyl 500 q8   Planning on 3 weeks of antibiotics and repeat CT abdomen/pelvis  Recommend to hold MTX until abscess clears: advised the patient to call her dermatologist  [address] [place] [address] with [employer] - can't see patient until 7/26/22    [facility] Lia, JEFF - CNP  4:26 PM  7/23/2022       Pt seen and examined.  Above discussed agree with advanced practice nurse. Labs, cultures, and radiographs reviewed. Face to Face encounter occurred. Changes made as necessary. Added fluconazole 150 mg 1 dose for vaginal candidiasis which is the recommended regimen. pt  already for 100mg just now so added 50mg extra dose.      Kiah Hall MD

## 2022-07-23 NOTE — PROGRESS NOTES
[facility] Progress Note    Admitting Date and Time: 7/20/2022 12:10 PM  Admit Dx: Diverticulitis [K57.92]    Subjective:  Patient is being followed for Diverticulitis [K57.92]     Patient is alert and in no acute distress. Family at bedside. She has no complaints of pain at this time.  States she only has pain with BM.     ROS: denies fever, chills, cp, sob, n/v, HA unless stated above.      sodium chloride flush  5-40 mL IntraVENous 2 times per day    heparin flush  3 mL IntraVENous 2 times per day    metroNIDAZOLE  500 mg Oral 3 times per day    sodium chloride flush  5-40 mL IntraVENous 2 times per day    enoxaparin  40 mg SubCUTAneous Daily    pantoprazole  40 mg IntraVENous Daily    cefepime  2,000 mg IntraVENous Q8H     iopamidol, 60 mL, ONCE PRN  sodium chloride flush, 5-40 mL, PRN  sodium chloride, , PRN  heparin flush, 3 mL, PRN  sodium chloride flush, 5-40 mL, PRN  sodium chloride, , PRN  ondansetron, 4 mg, Q8H PRN   Or  ondansetron, 4 mg, Q6H PRN  polyethylene glycol, 17 g, Daily PRN  acetaminophen, 650 mg, Q6H PRN   Or  acetaminophen, 650 mg, Q6H PRN  morphine, 2 mg, Q4H PRN       Objective:    BP (!) 148/76   Pulse 52   Temp 98.3 °F (36.8 °C) (Oral)   Resp 18   Ht 5' 6\" (1.676 m)   Wt 212 lb 11.9 oz (96.5 kg)   SpO2 99%   BMI 34.34 kg/m²     General Appearance: alert and oriented to person, place and time and in no acute distress  Skin: warm and dry  Head: normocephalic and atraumatic  Eyes: pupils equal, round, and reactive to light, extraocular eye movements intact, conjunctivae normal  Neck: neck supple and non tender without mass   Pulmonary/Chest: clear to auscultation bilaterally- no wheezes, rales or rhonchi, normal air movement, no respiratory distress  Cardiovascular: normal rate, normal S1 and S2 and no carotid bruits  Abdomen: soft, non-tender, non-distended, normal bowel sounds, no masses or organomegaly  Extremities: no cyanosis, no clubbing and no edema  Neurologic: no cranial nerve deficit and speech normal        Recent Labs     07/21/22  0452 07/22/22  0418 07/23/22  0530    140 136   K 3.9 4.1 4.6    109* 107   CO2 22 23 23   BUN 9 7 5*   CREATININE 0.7 0.7 0.7   GLUCOSE 92 99 232*   CALCIUM 8.6 8.5* 8.2*         Recent Labs     07/21/22  0452 07/22/22  0418 07/23/22  0530   WBC 5.8 5.3 4.8   RBC 3.68 3.53 3.45*   HGB 11.6 10.8* 10.6*   HCT 34.7 33.2* 33.4*   MCV 94.3 94.1 96.8   MCH 31.5 30.6 30.7   MCHC 33.4 32.5 31.7*   RDW 12.1 12.1 12.1    212 210   MPV 10.7 10.8 10.9           Assessment:    Principal Problem:    Diverticulitis  Active Problems:    Diverticulitis of intestine with abscess    Hydronephrosis    Psoriatic arthritis (HCC)    Gastroesophageal reflux disease  Resolved Problems:    * No resolved hospital problems. *      Plan:  Diverticulitis with abscess: c/o abdominal pain since July 3-saw outpatient gastroenterologist was placed on omnicef, CT abdomen at [facility] revealed diverticulitis with abscess patient started on Merrem and transferred to [facility]. Per Surgery/IR abdominal abscess appears too small to drain- Flagyl switched to PO, continue cefepime, Requiring antibiotics for 3 weeks then the CT abdomen/pelvis will be repeated. PICC placement for outpatient abx. Full liquid diet-Patient would diet advance- will defer to general surgery. BC no growth so far, UA negative, no leukocytosis-ID and general surgery input appreciated  Left sided hydronephrosis: Likely secondary to inflammatory process from diverticulitis. Creatinine remains stable. holding off on stenting at this time. Repeat renal US on Monday to assess status hydronephrosis-Urology input appreciated  Psoriatic arthritis/psoriasis: Takes methotrexate at home   GERD: Continue PPI    Disposition: Plan is for discharge home on IV antibiotics on 7/26 with [facility]    In review of the EMR, evaluation, management and diagnosis.   Care plan has been discussed with attending. Time spent 20 mins    NOTE: This report was transcribed using voice recognition software. Every effort was made to ensure accuracy; however, inadvertent computerized transcription errors may be present.   Electronically signed by JEFF Otto CNP on 7/23/2022 at 7:42 AM

## 2022-07-24 ENCOUNTER — APPOINTMENT (OUTPATIENT)
Dept: ULTRASOUND IMAGING | Age: 42
DRG: 244 | End: 2022-07-24
Attending: INTERNAL MEDICINE
Payer: MEDICAID

## 2022-07-24 LAB
ALBUMIN SERPL-MCNC: 3.2 G/DL (ref 3.5–5.2)
ALP BLD-CCNC: 120 U/L (ref 35–104)
ALT SERPL-CCNC: 9 U/L (ref 0–32)
ANION GAP SERPL CALCULATED.3IONS-SCNC: 8 MMOL/L (ref 7–16)
AST SERPL-CCNC: 15 U/L (ref 0–31)
BASOPHILS ABSOLUTE: 0.03 E9/L (ref 0–0.2)
BASOPHILS RELATIVE PERCENT: 0.5 % (ref 0–2)
BILIRUB SERPL-MCNC: 0.2 MG/DL (ref 0–1.2)
BUN BLDV-MCNC: 4 MG/DL (ref 6–20)
CALCIUM SERPL-MCNC: 8.7 MG/DL (ref 8.6–10.2)
CHLORIDE BLD-SCNC: 105 MMOL/L (ref 98–107)
CO2: 25 MMOL/L (ref 22–29)
CREAT SERPL-MCNC: 0.7 MG/DL (ref 0.5–1)
EOSINOPHILS ABSOLUTE: 0.07 E9/L (ref 0.05–0.5)
EOSINOPHILS RELATIVE PERCENT: 1.1 % (ref 0–6)
GFR AFRICAN AMERICAN: >60
GFR NON-AFRICAN AMERICAN: >60 ML/MIN/1.73
GLUCOSE BLD-MCNC: 103 MG/DL (ref 74–99)
HCT VFR BLD CALC: 34 % (ref 34–48)
HEMOGLOBIN: 10.8 G/DL (ref 11.5–15.5)
IMMATURE GRANULOCYTES #: 0.01 E9/L
IMMATURE GRANULOCYTES %: 0.2 % (ref 0–5)
LYMPHOCYTES ABSOLUTE: 1.54 E9/L (ref 1.5–4)
LYMPHOCYTES RELATIVE PERCENT: 24.3 % (ref 20–42)
MCH RBC QN AUTO: 30.7 PG (ref 26–35)
MCHC RBC AUTO-ENTMCNC: 31.8 % (ref 32–34.5)
MCV RBC AUTO: 96.6 FL (ref 80–99.9)
MONOCYTES ABSOLUTE: 0.5 E9/L (ref 0.1–0.95)
MONOCYTES RELATIVE PERCENT: 7.9 % (ref 2–12)
NEUTROPHILS ABSOLUTE: 4.19 E9/L (ref 1.8–7.3)
NEUTROPHILS RELATIVE PERCENT: 66 % (ref 43–80)
PDW BLD-RTO: 12.4 FL (ref 11.5–15)
PLATELET # BLD: 208 E9/L (ref 130–450)
PMV BLD AUTO: 10.9 FL (ref 7–12)
POTASSIUM SERPL-SCNC: 3.9 MMOL/L (ref 3.5–5)
RBC # BLD: 3.52 E12/L (ref 3.5–5.5)
SODIUM BLD-SCNC: 138 MMOL/L (ref 132–146)
TOTAL PROTEIN: 5.9 G/DL (ref 6.4–8.3)
WBC # BLD: 6.3 E9/L (ref 4.5–11.5)

## 2022-07-24 PROCEDURE — 99232 SBSQ HOSP IP/OBS MODERATE 35: CPT | Performed by: INTERNAL MEDICINE

## 2022-07-24 PROCEDURE — 99232 SBSQ HOSP IP/OBS MODERATE 35: CPT | Performed by: NURSE PRACTITIONER

## 2022-07-24 PROCEDURE — 6370000000 HC RX 637 (ALT 250 FOR IP): Performed by: STUDENT IN AN ORGANIZED HEALTH CARE EDUCATION/TRAINING PROGRAM

## 2022-07-24 PROCEDURE — 85025 COMPLETE CBC W/AUTO DIFF WBC: CPT

## 2022-07-24 PROCEDURE — 2580000003 HC RX 258: Performed by: STUDENT IN AN ORGANIZED HEALTH CARE EDUCATION/TRAINING PROGRAM

## 2022-07-24 PROCEDURE — 76770 US EXAM ABDO BACK WALL COMP: CPT

## 2022-07-24 PROCEDURE — 99232 SBSQ HOSP IP/OBS MODERATE 35: CPT | Performed by: SURGERY

## 2022-07-24 PROCEDURE — 80053 COMPREHEN METABOLIC PANEL: CPT

## 2022-07-24 PROCEDURE — 6360000002 HC RX W HCPCS: Performed by: INTERNAL MEDICINE

## 2022-07-24 PROCEDURE — 36415 COLL VENOUS BLD VENIPUNCTURE: CPT

## 2022-07-24 PROCEDURE — 2060000000 HC ICU INTERMEDIATE R&B

## 2022-07-24 PROCEDURE — 6360000002 HC RX W HCPCS: Performed by: NURSE PRACTITIONER

## 2022-07-24 PROCEDURE — 6360000002 HC RX W HCPCS: Performed by: STUDENT IN AN ORGANIZED HEALTH CARE EDUCATION/TRAINING PROGRAM

## 2022-07-24 PROCEDURE — 2580000003 HC RX 258: Performed by: INTERNAL MEDICINE

## 2022-07-24 PROCEDURE — C9113 INJ PANTOPRAZOLE SODIUM, VIA: HCPCS | Performed by: INTERNAL MEDICINE

## 2022-07-24 PROCEDURE — 6360000002 HC RX W HCPCS

## 2022-07-24 RX ORDER — METRONIDAZOLE 500 MG/1
500 TABLET ORAL EVERY 8 HOURS SCHEDULED
Qty: 42 TABLET | Refills: 0 | Status: SHIPPED | OUTPATIENT
Start: 2022-07-24 | End: 2022-07-25 | Stop reason: SDUPTHER

## 2022-07-24 RX ORDER — FLUCONAZOLE 150 MG/1
150 TABLET ORAL PRN
Qty: 5 TABLET | Refills: 0 | Status: ON HOLD | OUTPATIENT
Start: 2022-07-24 | End: 2022-09-08 | Stop reason: HOSPADM

## 2022-07-24 RX ADMIN — SODIUM CHLORIDE, PRESERVATIVE FREE 10 ML: 5 INJECTION INTRAVENOUS at 08:33

## 2022-07-24 RX ADMIN — CEFEPIME 2000 MG: 2 INJECTION, POWDER, FOR SOLUTION INTRAVENOUS at 16:18

## 2022-07-24 RX ADMIN — CEFEPIME 2000 MG: 2 INJECTION, POWDER, FOR SOLUTION INTRAVENOUS at 08:35

## 2022-07-24 RX ADMIN — CEFEPIME 2000 MG: 2 INJECTION, POWDER, FOR SOLUTION INTRAVENOUS at 01:27

## 2022-07-24 RX ADMIN — SODIUM CHLORIDE, PRESERVATIVE FREE 300 UNITS: 5 INJECTION INTRAVENOUS at 08:33

## 2022-07-24 RX ADMIN — METRONIDAZOLE 500 MG: 500 TABLET ORAL at 16:15

## 2022-07-24 RX ADMIN — METRONIDAZOLE 500 MG: 500 TABLET ORAL at 05:16

## 2022-07-24 RX ADMIN — PANTOPRAZOLE SODIUM 40 MG: 40 INJECTION, POWDER, FOR SOLUTION INTRAVENOUS at 08:33

## 2022-07-24 RX ADMIN — MORPHINE SULFATE 2 MG: 2 INJECTION, SOLUTION INTRAMUSCULAR; INTRAVENOUS at 06:15

## 2022-07-24 ASSESSMENT — PAIN SCALES - GENERAL
PAINLEVEL_OUTOF10: 7
PAINLEVEL_OUTOF10: 0
PAINLEVEL_OUTOF10: 1
PAINLEVEL_OUTOF10: 0

## 2022-07-24 ASSESSMENT — PAIN DESCRIPTION - ORIENTATION: ORIENTATION: LOWER;LEFT;RIGHT

## 2022-07-24 ASSESSMENT — PAIN DESCRIPTION - LOCATION: LOCATION: BACK

## 2022-07-24 NOTE — PROGRESS NOTES
[address] [place] [address] [facility]  [facility]  Progress Note    SUBJECTIVE:  CC: abdominal pain    Patient is tolerating medications. No reported adverse drug reactions. Sitting on couch  and daughter present  Feeling better, still painful bowel movements and lack of appetite  Concerned about getting another yeast infection    Review of systems:  As stated above in the chief complaint, otherwise negative. Medications:  Scheduled Meds:   sodium chloride flush  5-40 mL IntraVENous 2 times per day    heparin flush  3 mL IntraVENous 2 times per day    metroNIDAZOLE  500 mg Oral 3 times per day    sodium chloride flush  5-40 mL IntraVENous 2 times per day    enoxaparin  40 mg SubCUTAneous Daily    pantoprazole  40 mg IntraVENous Daily    cefepime  2,000 mg IntraVENous Q8H     Continuous Infusions:   sodium chloride      sodium chloride       PRN Meds:iopamidol, sodium chloride flush, sodium chloride, heparin flush, sodium chloride flush, sodium chloride, ondansetron **OR** ondansetron, polyethylene glycol, acetaminophen **OR** acetaminophen, morphine    OBJECTIVE:  /67   Pulse (!) 44   Temp 97 °F (36.1 °C) (Temporal)   Resp 18   Ht 5' 6\" (1.676 m)   Wt 212 lb 11.2 oz (96.5 kg)   SpO2 96%   BMI 34.33 kg/m²   Temp  Av.6 °F (36.4 °C)  Min: 97 °F (36.1 °C)  Max: 98.1 °F (36.7 °C)  Constitutional: The patient is awake, alert, and oriented. Skin: Warm and dry. No rashes were noted. HEENT: Round and reactive pupils. Moist mucous membranes. No ulcerations or thrush. Neck: Supple to movements. Chest: No use of accessory muscles to breathe. Symmetrical expansion. No wheezing, crackles or rhonchi. Cardiovascular: S1 and S2 are rhythmic and regular. No murmurs appreciated. Abdomen: Positive bowel sounds to auscultation. Benign to palpation. No masses felt. +diffuse tenderness  Extremities: No clubbing, no cyanosis, no edema.   Lines: R Basilic PICC 3/23/42 19TX    Laboratory and Tests Review:  Lab Results   Component Value Date    WBC 6.3 07/24/2022    WBC 4.8 07/23/2022    WBC 5.3 07/22/2022    HGB 10.8 (L) 07/24/2022    HCT 34.0 07/24/2022    MCV 96.6 07/24/2022     07/24/2022     Lab Results   Component Value Date    NEUTROABS 4.19 07/24/2022    NEUTROABS 3.09 07/23/2022    NEUTROABS 3.65 07/22/2022     No results found for: CRPHS  Lab Results   Component Value Date    ALT 9 07/24/2022    AST 15 07/24/2022    ALKPHOS 120 (H) 07/24/2022    BILITOT 0.2 07/24/2022     Lab Results   Component Value Date/Time     07/24/2022 05:23 AM    K 3.9 07/24/2022 05:23 AM     07/24/2022 05:23 AM    CO2 25 07/24/2022 05:23 AM    BUN 4 07/24/2022 05:23 AM    CREATININE 0.7 07/24/2022 05:23 AM    CREATININE 0.7 07/23/2022 05:30 AM    CREATININE 0.7 07/22/2022 04:18 AM    GFRAA >60 07/24/2022 05:23 AM    LABGLOM >60 07/24/2022 05:23 AM    GLUCOSE 103 07/24/2022 05:23 AM    PROT 5.9 07/24/2022 05:23 AM    LABALBU 3.2 07/24/2022 05:23 AM    CALCIUM 8.7 07/24/2022 05:23 AM    BILITOT 0.2 07/24/2022 05:23 AM    ALKPHOS 120 07/24/2022 05:23 AM    AST 15 07/24/2022 05:23 AM    ALT 9 07/24/2022 05:23 AM     No results found for: CRP  No results found for: Gianna Barron  Radiology:      Microbiology:   Blood Culture 7/20/22: so far negative    ASSESSMENT:  Sigmoid diverticulitis with abscess between rectum/bladder: reviewed surgery notes: IR cannot drain it. Psoriasis on Mtx: last dose last Saturday  Immunocompromised host:   Vaginal candidiasis when on antibiotics:     PLAN:  Continue IV Cefepime 2gr q 8 and oral Flagyl 500 q8 - script on chart   Planning on 3 weeks of antibiotics and repeat CT abdomen/pelvis  Diflucan 150mg p.o. prn on discharge due to patient frequently getting yeast infections while on antibiotics - advised her to take one dose when she gets it.    Recommend to hold MTX until abscess clears: advised the patient to call her dermatologist  [address] [facility] with [employer] - can't see patient until 7/26/22    JEFF Hussein - CNP  2:14 PM  7/24/2022     Pt seen and examined. Above discussed agree with advanced practice nurse. Labs, cultures, and radiographs reviewed. Face to Face encounter occurred. Changes made as necessary.      Clay Brown MD

## 2022-07-24 NOTE — PLAN OF CARE
Problem: Discharge Planning  Goal: Discharge to home or other facility with appropriate resources  Outcome: Progressing     Problem: Pain  Goal: Verbalizes/displays adequate comfort level or baseline comfort level  7/24/2022 1146 by Evon Choudhury RN  Outcome: Progressing     Problem: ABCDS Injury Assessment  Goal: Absence of physical injury  7/24/2022 1146 by Evon Choudhury RN  Outcome: Progressing

## 2022-07-24 NOTE — PROGRESS NOTES
[facility] Progress Note    Admitting Date and Time: 7/20/2022 12:10 PM  Admit Dx: Diverticulitis [K57.92]    Subjective:  Patient is being followed for Diverticulitis [K57.92]     Patient is alert and in no acute distress. Family at bedside. She has no complaints of pain at this time. She is tolerating regular diet without issue. Still states only has pain with BM.   No other complaints at this time    ROS: denies fever, chills, cp, sob, n/v, HA unless stated above.      sodium chloride flush  5-40 mL IntraVENous 2 times per day    heparin flush  3 mL IntraVENous 2 times per day    metroNIDAZOLE  500 mg Oral 3 times per day    sodium chloride flush  5-40 mL IntraVENous 2 times per day    enoxaparin  40 mg SubCUTAneous Daily    pantoprazole  40 mg IntraVENous Daily    cefepime  2,000 mg IntraVENous Q8H     iopamidol, 60 mL, ONCE PRN  sodium chloride flush, 5-40 mL, PRN  sodium chloride, , PRN  heparin flush, 3 mL, PRN  sodium chloride flush, 5-40 mL, PRN  sodium chloride, , PRN  ondansetron, 4 mg, Q8H PRN   Or  ondansetron, 4 mg, Q6H PRN  polyethylene glycol, 17 g, Daily PRN  acetaminophen, 650 mg, Q6H PRN   Or  acetaminophen, 650 mg, Q6H PRN  morphine, 2 mg, Q4H PRN       Objective:    BP (!) 143/72   Pulse 52   Temp 98.1 °F (36.7 °C) (Temporal)   Resp 18   Ht 5' 6\" (1.676 m)   Wt 212 lb 11.9 oz (96.5 kg)   SpO2 98%   BMI 34.34 kg/m²     General Appearance: alert and oriented to person, place and time and in no acute distress  Skin: warm and dry  Head: normocephalic and atraumatic  Eyes: pupils equal, round, and reactive to light, extraocular eye movements intact, conjunctivae normal  Neck: neck supple and non tender without mass   Pulmonary/Chest: clear to auscultation bilaterally- no wheezes, rales or rhonchi, normal air movement, no respiratory distress  Cardiovascular: normal rate, normal S1 and S2 and no carotid bruits  Abdomen: soft, non-tender, non-distended, normal bowel sounds, no masses or organomegaly  Extremities: no cyanosis, no clubbing and no edema  Neurologic: no cranial nerve deficit and speech normal        Recent Labs     07/22/22 0418 07/23/22  0530 07/24/22  0523    136 138   K 4.1 4.6 3.9   * 107 105   CO2 23 23 25   BUN 7 5* 4*   CREATININE 0.7 0.7 0.7   GLUCOSE 99 232* 103*   CALCIUM 8.5* 8.2* 8.7         Recent Labs     07/22/22  0418 07/23/22  0530 07/24/22  0523   WBC 5.3 4.8 6.3   RBC 3.53 3.45* 3.52   HGB 10.8* 10.6* 10.8*   HCT 33.2* 33.4* 34.0   MCV 94.1 96.8 96.6   MCH 30.6 30.7 30.7   MCHC 32.5 31.7* 31.8*   RDW 12.1 12.1 12.4    210 208   MPV 10.8 10.9 10.9           Assessment:    Principal Problem:    Diverticulitis  Active Problems:    Diverticulitis of intestine with abscess    Hydronephrosis    Psoriatic arthritis (HCC)    Gastroesophageal reflux disease  Resolved Problems:    * No resolved hospital problems. *      Plan:  Diverticulitis with abscess: c/o abdominal pain since 7/3-saw outpatient gastroenterologist was placed on omnicef, CT abdomen at [facility] revealed diverticulitis with abscess patient started on Merrem and transferred to [facility]. Per Surgery/IR abdominal abscess appears too small to drain- Flagyl switched to PO, continue cefepime, Requiring antibiotics for 3 weeks then the CT abdomen/pelvis will be repeated. PICC placement for outpatient abx. BC no growth so far, UA negative, no leukocytosis, Advanced to Regular low fiber diet-ID and general surgery input appreciated  Left sided hydronephrosis: Likely secondary to inflammatory process from diverticulitis. Creatinine remains stable. holding off on stenting at this time.  Repeat renal US on tomorrow to assess status hydronephrosis-Urology input appreciated  Psoriatic arthritis/psoriasis: Takes methotrexate at home- continue holding  GERD: Continue PPI    Disposition: Plan is for discharge home on IV antibiotics on 7/26 with [facility]    In review of the EMR, evaluation, management and diagnosis. Care plan has been discussed with attending. Time spent 20 mins    NOTE: This report was transcribed using voice recognition software. Every effort was made to ensure accuracy; however, inadvertent computerized transcription errors may be present.   Electronically signed by JEFF Ferrell CNP on 7/24/2022 at 7:50 AM

## 2022-07-25 LAB
ALBUMIN SERPL-MCNC: 3.3 G/DL (ref 3.5–5.2)
ALP BLD-CCNC: 123 U/L (ref 35–104)
ALT SERPL-CCNC: 8 U/L (ref 0–32)
ANION GAP SERPL CALCULATED.3IONS-SCNC: 10 MMOL/L (ref 7–16)
AST SERPL-CCNC: 17 U/L (ref 0–31)
BASOPHILS ABSOLUTE: 0.02 E9/L (ref 0–0.2)
BASOPHILS RELATIVE PERCENT: 0.3 % (ref 0–2)
BILIRUB SERPL-MCNC: 0.2 MG/DL (ref 0–1.2)
BLOOD CULTURE, ROUTINE: NORMAL
BUN BLDV-MCNC: 4 MG/DL (ref 6–20)
CALCIUM SERPL-MCNC: 8.9 MG/DL (ref 8.6–10.2)
CHLORIDE BLD-SCNC: 106 MMOL/L (ref 98–107)
CO2: 23 MMOL/L (ref 22–29)
CREAT SERPL-MCNC: 0.7 MG/DL (ref 0.5–1)
CULTURE, BLOOD 2: NORMAL
EOSINOPHILS ABSOLUTE: 0.13 E9/L (ref 0.05–0.5)
EOSINOPHILS RELATIVE PERCENT: 2.1 % (ref 0–6)
GFR AFRICAN AMERICAN: >60
GFR NON-AFRICAN AMERICAN: >60 ML/MIN/1.73
GLUCOSE BLD-MCNC: 118 MG/DL (ref 74–99)
HCT VFR BLD CALC: 35.6 % (ref 34–48)
HEMOGLOBIN: 11.5 G/DL (ref 11.5–15.5)
IMMATURE GRANULOCYTES #: 0.01 E9/L
IMMATURE GRANULOCYTES %: 0.2 % (ref 0–5)
LYMPHOCYTES ABSOLUTE: 1.85 E9/L (ref 1.5–4)
LYMPHOCYTES RELATIVE PERCENT: 29.7 % (ref 20–42)
MCH RBC QN AUTO: 31.1 PG (ref 26–35)
MCHC RBC AUTO-ENTMCNC: 32.3 % (ref 32–34.5)
MCV RBC AUTO: 96.2 FL (ref 80–99.9)
MONOCYTES ABSOLUTE: 0.58 E9/L (ref 0.1–0.95)
MONOCYTES RELATIVE PERCENT: 9.3 % (ref 2–12)
NEUTROPHILS ABSOLUTE: 3.64 E9/L (ref 1.8–7.3)
NEUTROPHILS RELATIVE PERCENT: 58.4 % (ref 43–80)
PDW BLD-RTO: 12.3 FL (ref 11.5–15)
PLATELET # BLD: 217 E9/L (ref 130–450)
PMV BLD AUTO: 11.1 FL (ref 7–12)
POTASSIUM SERPL-SCNC: 3.5 MMOL/L (ref 3.5–5)
RBC # BLD: 3.7 E12/L (ref 3.5–5.5)
SODIUM BLD-SCNC: 139 MMOL/L (ref 132–146)
TOTAL PROTEIN: 6.4 G/DL (ref 6.4–8.3)
WBC # BLD: 6.2 E9/L (ref 4.5–11.5)

## 2022-07-25 PROCEDURE — C9113 INJ PANTOPRAZOLE SODIUM, VIA: HCPCS | Performed by: INTERNAL MEDICINE

## 2022-07-25 PROCEDURE — 6360000002 HC RX W HCPCS: Performed by: STUDENT IN AN ORGANIZED HEALTH CARE EDUCATION/TRAINING PROGRAM

## 2022-07-25 PROCEDURE — 6370000000 HC RX 637 (ALT 250 FOR IP): Performed by: STUDENT IN AN ORGANIZED HEALTH CARE EDUCATION/TRAINING PROGRAM

## 2022-07-25 PROCEDURE — 2580000003 HC RX 258: Performed by: INTERNAL MEDICINE

## 2022-07-25 PROCEDURE — 6360000002 HC RX W HCPCS: Performed by: INTERNAL MEDICINE

## 2022-07-25 PROCEDURE — 2580000003 HC RX 258: Performed by: STUDENT IN AN ORGANIZED HEALTH CARE EDUCATION/TRAINING PROGRAM

## 2022-07-25 PROCEDURE — 6360000002 HC RX W HCPCS

## 2022-07-25 PROCEDURE — 36415 COLL VENOUS BLD VENIPUNCTURE: CPT

## 2022-07-25 PROCEDURE — 99232 SBSQ HOSP IP/OBS MODERATE 35: CPT | Performed by: INTERNAL MEDICINE

## 2022-07-25 PROCEDURE — 2580000003 HC RX 258

## 2022-07-25 PROCEDURE — 85025 COMPLETE CBC W/AUTO DIFF WBC: CPT

## 2022-07-25 PROCEDURE — 99232 SBSQ HOSP IP/OBS MODERATE 35: CPT | Performed by: SURGERY

## 2022-07-25 PROCEDURE — 80053 COMPREHEN METABOLIC PANEL: CPT

## 2022-07-25 PROCEDURE — 1200000000 HC SEMI PRIVATE

## 2022-07-25 RX ORDER — METRONIDAZOLE 500 MG/1
500 TABLET ORAL EVERY 8 HOURS SCHEDULED
Qty: 63 TABLET | Refills: 0 | Status: SHIPPED | OUTPATIENT
Start: 2022-07-25 | End: 2022-08-15

## 2022-07-25 RX ORDER — PANTOPRAZOLE SODIUM 40 MG/1
40 TABLET, DELAYED RELEASE ORAL
Status: DISCONTINUED | OUTPATIENT
Start: 2022-07-26 | End: 2022-07-26 | Stop reason: HOSPADM

## 2022-07-25 RX ADMIN — SODIUM CHLORIDE, PRESERVATIVE FREE 10 ML: 5 INJECTION INTRAVENOUS at 09:19

## 2022-07-25 RX ADMIN — CEFEPIME 2000 MG: 2 INJECTION, POWDER, FOR SOLUTION INTRAVENOUS at 09:18

## 2022-07-25 RX ADMIN — CEFEPIME 2000 MG: 2 INJECTION, POWDER, FOR SOLUTION INTRAVENOUS at 16:29

## 2022-07-25 RX ADMIN — SODIUM CHLORIDE, PRESERVATIVE FREE 300 UNITS: 5 INJECTION INTRAVENOUS at 09:11

## 2022-07-25 RX ADMIN — METRONIDAZOLE 500 MG: 500 TABLET ORAL at 16:24

## 2022-07-25 RX ADMIN — SODIUM CHLORIDE, PRESERVATIVE FREE 10 ML: 5 INJECTION INTRAVENOUS at 09:11

## 2022-07-25 RX ADMIN — SODIUM CHLORIDE, PRESERVATIVE FREE 300 UNITS: 5 INJECTION INTRAVENOUS at 21:30

## 2022-07-25 RX ADMIN — METRONIDAZOLE 500 MG: 500 TABLET ORAL at 09:09

## 2022-07-25 RX ADMIN — SODIUM CHLORIDE, PRESERVATIVE FREE 10 ML: 5 INJECTION INTRAVENOUS at 21:30

## 2022-07-25 RX ADMIN — PANTOPRAZOLE SODIUM 40 MG: 40 INJECTION, POWDER, FOR SOLUTION INTRAVENOUS at 09:09

## 2022-07-25 RX ADMIN — CEFEPIME 2000 MG: 2 INJECTION, POWDER, FOR SOLUTION INTRAVENOUS at 01:31

## 2022-07-25 RX ADMIN — METRONIDAZOLE 500 MG: 500 TABLET ORAL at 01:29

## 2022-07-25 ASSESSMENT — PAIN DESCRIPTION - ORIENTATION: ORIENTATION: RIGHT;LEFT

## 2022-07-25 ASSESSMENT — PAIN DESCRIPTION - PAIN TYPE: TYPE: ACUTE PAIN

## 2022-07-25 ASSESSMENT — PAIN DESCRIPTION - DESCRIPTORS: DESCRIPTORS: DISCOMFORT

## 2022-07-25 ASSESSMENT — PAIN DESCRIPTION - LOCATION: LOCATION: HIP

## 2022-07-25 ASSESSMENT — PAIN SCALES - GENERAL
PAINLEVEL_OUTOF10: 0
PAINLEVEL_OUTOF10: 1

## 2022-07-25 ASSESSMENT — PAIN - FUNCTIONAL ASSESSMENT: PAIN_FUNCTIONAL_ASSESSMENT: ACTIVITIES ARE NOT PREVENTED

## 2022-07-25 NOTE — PROGRESS NOTES
7/25/2022 2:52 PM  Service: Urology  Group: [facility] (Les/Estefani/Sergey)    Ratna Comp  [id number]    Subjective:   She has no new complaints today   No left flank pain   No fevers  She is being discharged home tomorrow with IV antibiotics     Review of Systems  Constitutional: No fever or chills   Respiratory: negative for cough and hemoptysis  Cardiovascular: negative for chest pain and dyspnea  Gastrointestinal: negative for abdominal pain, diarrhea, nausea and vomiting   : See above  Derm: negative for rash and skin lesion(s)  Neurological: negative for seizures and tremors  Musculoskeletal: Negative    Psychiatric: Negative   All other reviews are negative      Scheduled Meds:   [START ON 7/26/2022] pantoprazole  40 mg Oral QAM AC    sodium chloride flush  5-40 mL IntraVENous 2 times per day    heparin flush  3 mL IntraVENous 2 times per day    metroNIDAZOLE  500 mg Oral 3 times per day    sodium chloride flush  5-40 mL IntraVENous 2 times per day    enoxaparin  40 mg SubCUTAneous Daily    cefepime  2,000 mg IntraVENous Q8H       Objective:  Vitals:    07/25/22 1429   BP: 136/75   Pulse: (!) 40   Resp: 18   Temp:    SpO2:          Allergies: Bee venom and Pcn [penicillins]    General Appearance: alert and oriented to person, place and time and in no acute distress  Skin: no rash or erythema  Head: normocephalic and atraumatic  Pulmonary/Chest: normal air movement, no respiratory distress  Abdomen: soft, non-tender, non-distended  Genitourinary: No [place]  Extremities: no cyanosis, clubbing or edema         Labs:     Recent Labs     07/25/22  0138      K 3.5      CO2 23   BUN 4*   CREATININE 0.7   GLUCOSE 118*   CALCIUM 8.9       Lab Results   Component Value Date/Time    HGB 11.5 07/25/2022 01:38 AM    HCT 35.6 07/25/2022 01:38 AM       No results found for: PSA  Narrative   EXAMINATION:   RETROPERITONEAL ULTRASOUND OF THE KIDNEYS AND URINARY BLADDER       7/24/2022       COMPARISON: None       HISTORY:   ORDERING SYSTEM PROVIDED HISTORY: eval hydronephrosis   TECHNOLOGIST PROVIDED HISTORY:       Reason for exam:->eval hydronephrosis   What reading provider will be dictating this exam?->CRC       FINDINGS:       Kidneys:       Right kidney: 11 x 4.7 x 4.1 cm; cortical thickness 11 mm. Left kidney: 11.6 x 6.2 x 5.2 cm; cortical thickness 15 mm. Right kidney has preserved size, cortical thickness. There is no obstructive   uropathy. No renal calculi or hydronephrosis are seen. The left kidney has normal size with preserved cortical thickness but there   is mild to moderate hydronephrosis changes. See report of the CT abdomen   pelvis of July 21st the which demonstrates signs of diverticulitis causing   hydronephrosis changes in the left kidney. Ureter jets were seen on the right-side but not on the left side. Bladder:       Volume of the bladder time the present study 98 mL. Some debris is seen in   the dependent portion of the bladder. Impression   Persistent hydronephrosis changes in the left kidney as seen on the study of   July 21, 2022 CT abdomen pelvis. See report of the CT scan abdomen pelvis.        RECOMMENDATIONS:   Unavailable       Assessment/Plan:  Left hydronephrosis and hydroureter  Diverticular abscess    General surgery following, no IR drainage of abscess was done (too small per IR)  ID following, patient receiving IV antibiotics and to be discharged home on IV antibiotics   Repeat renal ultrasound was performed shows left hydronephrosis   Her creatinine has remained stable during admission  She is hemodynamically stable  We will obtain MAG3 renal Lasix scan  If obstruction noted she will need scheduled for outpatient cystoscopy with left ureteral stent insertion   Will follow       JEFF Grijalva - ELIZABETH   [facility]  Urology

## 2022-07-25 NOTE — CARE COORDINATION
CASE MANAGEMENT. .. Per Dr Chasity Aragon request, outpatient CT abd/pelvis scheduled for Monday Aug 15, 2022 at [facility]. Spoke with Loan Powers in scheduling.  Appointment information/instructions are in the AVS.

## 2022-07-25 NOTE — PROGRESS NOTES
clubbing and no edema  Neurologic: no cranial nerve deficit and speech normal  Picc line right arm 7/22     Recent Labs     07/23/22  0530 07/24/22  0523 07/25/22  0138    138 139   K 4.6 3.9 3.5    105 106   CO2 23 25 23   BUN 5* 4* 4*   CREATININE 0.7 0.7 0.7   GLUCOSE 232* 103* 118*   CALCIUM 8.2* 8.7 8.9       Recent Labs     07/23/22  0530 07/24/22  0523 07/25/22  0138   WBC 4.8 6.3 6.2   RBC 3.45* 3.52 3.70   HGB 10.6* 10.8* 11.5   HCT 33.4* 34.0 35.6   MCV 96.8 96.6 96.2   MCH 30.7 30.7 31.1   MCHC 31.7* 31.8* 32.3   RDW 12.1 12.4 12.3    208 217   MPV 10.9 10.9 11.1       Radiology: reviewed     US 7/24 shows persistent hydronephrosis         Assessment:    Principal Problem:    Diverticulitis  Active Problems:    Diverticulitis of intestine with abscess    Hydronephrosis    Psoriatic arthritis ([address]    Gastroesophageal reflux disease  Resolved Problems:    * No resolved hospital problems. *    Plan:  Diverticulitis with abscess: c/o abdominal pain since 7/3-saw outpatient gastroenterologist was placed on omnicef, [facility] patient started on Merrem and transferred to [facility]. Per Surgery/IR abdominal abscess appears too small to drain- Flagyl switched to PO, continue cefepime, Requiring antibiotics for 3 weeks then the CT abdomen/pelvis will be repeated. Per ID, will give Diflucan PRN. PICC placement for outpatient abx. BC no growth so far, UA negative, no leukocytosis, Advanced to Regular low fiber diet-ID and general surgery input appreciated  Left sided hydronephrosis: Asymptomatic - Likely secondary to inflammatory process from diverticulitis. Creatinine remains stable at 0.7. Per urology, holding off on stenting at this time.  Repeat renal US 7/24 showed persistent hydronephrosis-Urology input appreciated  Psoriatic arthritis/psoriasis: Takes methotrexate at home- continue holding  GERD: Continue PPI     CODE: full  DVT prophylaxis: heparin  Disposition: [facility] for IV ATB to start 7/26/22 at 1600     25 minutes time spent reviewing patient chart, assessing patient, discussing plan of care with patient and family, discussing plan of care with collaborating physician, and charting.      Electronically signed by JEFF Trotter NP on 7/25/2022 at 9:51 AM

## 2022-07-25 NOTE — CARE COORDINATION
Social Work/Discharge Planning:  Chart reviewed. Called Haily Clarke with [facility] and confirmed they can start services tomorrow. Haily Clarke states patient will need to be home before her 4:00pm IV dose. Met with patient and provided her with an update. She states she lives an hour away, so would need to discharge home before 2:00pm.  Faxed IV script to [facility]. Will continue to follow.  Electronically signed by DOREEN Purvis on 7/25/2022 at 9:46 AM

## 2022-07-25 NOTE — PROGRESS NOTES
[address] [place] [address] [facility]  Progress Note    SUBJECTIVE:  CC: abdominal pain    Patient is tolerating medications. No reported adverse drug reactions. Laying in bed, feeling better  Able to eat  Says her yeast infection is still there but getting better     Review of systems:  As stated above in the chief complaint, otherwise negative. Medications:  Scheduled Meds:   [START ON 2022] pantoprazole  40 mg Oral QAM AC    sodium chloride flush  5-40 mL IntraVENous 2 times per day    heparin flush  3 mL IntraVENous 2 times per day    metroNIDAZOLE  500 mg Oral 3 times per day    sodium chloride flush  5-40 mL IntraVENous 2 times per day    enoxaparin  40 mg SubCUTAneous Daily    cefepime  2,000 mg IntraVENous Q8H     Continuous Infusions:   sodium chloride      sodium chloride       PRN Meds:iopamidol, sodium chloride flush, sodium chloride, heparin flush, sodium chloride flush, sodium chloride, ondansetron **OR** ondansetron, polyethylene glycol, acetaminophen **OR** acetaminophen, morphine    OBJECTIVE:  /80   Pulse 50   Temp 97.9 °F (36.6 °C) (Oral)   Resp 16   Ht 5' 6\" (1.676 m)   Wt 212 lb 11.2 oz (96.5 kg)   SpO2 96%   BMI 34.33 kg/m²   Temp  Av.3 °F (36.8 °C)  Min: 97.9 °F (36.6 °C)  Max: 98.6 °F (37 °C)  Constitutional: The patient is awake, alert, and oriented. Skin: Warm and dry. No rashes were noted. HEENT: Round and reactive pupils. Moist mucous membranes. No ulcerations or thrush. Neck: Supple to movements. Chest: No use of accessory muscles to breathe. Symmetrical expansion. No wheezing, crackles or rhonchi. Cardiovascular: S1 and S2 are rhythmic and regular. No murmurs appreciated. Abdomen: Positive bowel sounds to auscultation. Benign to palpation. No masses felt. +diffuse tenderness  Extremities: No clubbing, no cyanosis, no edema.   Lines: R Basilic PICC 57 57UL    Laboratory and Tests Review:  Lab Results   Component Value Date    WBC 6.2 07/25/2022    WBC 6.3 07/24/2022    WBC 4.8 07/23/2022    HGB 11.5 07/25/2022    HCT 35.6 07/25/2022    MCV 96.2 07/25/2022     07/25/2022     Lab Results   Component Value Date    NEUTROABS 3.64 07/25/2022    NEUTROABS 4.19 07/24/2022    NEUTROABS 3.09 07/23/2022     No results found for: CRPHS  Lab Results   Component Value Date    ALT 8 07/25/2022    AST 17 07/25/2022    ALKPHOS 123 (H) 07/25/2022    BILITOT 0.2 07/25/2022     Lab Results   Component Value Date/Time     07/25/2022 01:38 AM    K 3.5 07/25/2022 01:38 AM     07/25/2022 01:38 AM    CO2 23 07/25/2022 01:38 AM    BUN 4 07/25/2022 01:38 AM    CREATININE 0.7 07/25/2022 01:38 AM    CREATININE 0.7 07/24/2022 05:23 AM    CREATININE 0.7 07/23/2022 05:30 AM    GFRAA >60 07/25/2022 01:38 AM    LABGLOM >60 07/25/2022 01:38 AM    GLUCOSE 118 07/25/2022 01:38 AM    PROT 6.4 07/25/2022 01:38 AM    LABALBU 3.3 07/25/2022 01:38 AM    CALCIUM 8.9 07/25/2022 01:38 AM    BILITOT 0.2 07/25/2022 01:38 AM    ALKPHOS 123 07/25/2022 01:38 AM    AST 17 07/25/2022 01:38 AM    ALT 8 07/25/2022 01:38 AM     No results found for: CRP  No results found for: [address]  Radiology:      Microbiology:   Blood Culture 7/20/22: so far negative    ASSESSMENT:  Sigmoid diverticulitis with abscess between rectum/bladder: reviewed surgery notes: IR cannot drain it. Psoriasis on Mtx: last dose last Saturday  Immunocompromised host:   Vaginal candidiasis when on antibiotics:     PLAN:  Continue IV Cefepime 2gr q 8 and oral Flagyl 500 q8 - script on chart   Diflucan 150mg p.o. prn on discharge due to patient frequently getting yeast infections while on antibiotics - advised her to take one dose when she gets it.    Recommend to hold MTX until abscess clears: advised the patient to call her dermatologist  [facility] with [facility] [address] - okay to DC after 9am dose of Cefepime tomorrow  F/u with Dr. Toro Navarrete on August 17  Get CT Abd/Pelvis with oral contrast between

## 2022-07-25 NOTE — PROGRESS NOTES
GENERAL SURGERY  DAILY PROGRESS NOTE  7/25/2022    CC: abdominal pain      Subjective:  Patient reports good pain control. Renal US yesterday demonstrates persistent hydronephrosis    Objective:  /80   Pulse 50   Temp 97.9 °F (36.6 °C) (Oral)   Resp 16   Ht 5' 6\" (1.676 m)   Wt 212 lb 11.2 oz (96.5 kg)   SpO2 96%   BMI 34.33 kg/m²     GENERAL:  Laying in bed, awake, alert, cooperative, no apparent distress  HEAD: Normocephalic, atraumatic  EYES: No sclera icterus, pupils equal  LUNGS:  No increased work of breathing  CARDIOVASCULAR:  RR  ABDOMEN:  Soft, mild LLQ tenderness, non-distended  EXTREMITIES: No edema or swelling  SKIN: Warm and dry    Assessment/Plan:  36 y.o. female with sigmoid diverticulitis     Continue antibiotics per ID  Monitor abdominal exam closely  Diet - low fiber diet  Will need outpatient colonoscopy    Electronically signed by Sam Cintron MD on 7/25/2022 at 8:18 AM    Attending Physician Statement:    Chief Complaint: Diverticulitis    I have examined the patient and performed the key aspects of physical exam, reviewed the record (including all pertinent and new radiology images and laboratory findings), and discussed the case with the surgical team.  I agree with the assessment and plan with the following additions, corrections, and changes. 14pt review of symptoms completed and negative except as mentioned. Feeling much better. Denies any significant pain. Just complains of a mild discomfort when pushed on. She is moving her bowels. She is tolerating a diet. Okay to discharge from surgical standpoint when home care for antibiotics are set up. Dinah Polk MD  07/25/22  9:14 AM    NOTE: This report, in part or full, may have been transcribed using voice recognition software. Every effort was made to ensure accuracy; however, inadvertent computerized transcription errors may be present.  Please excuse any transcriptional grammatical or spelling errors that may have escaped my editorial review.

## 2022-07-26 ENCOUNTER — APPOINTMENT (OUTPATIENT)
Dept: NUCLEAR MEDICINE | Age: 42
DRG: 244 | End: 2022-07-26
Attending: INTERNAL MEDICINE
Payer: MEDICAID

## 2022-07-26 VITALS
DIASTOLIC BLOOD PRESSURE: 83 MMHG | HEIGHT: 66 IN | TEMPERATURE: 98.3 F | HEART RATE: 58 BPM | WEIGHT: 209.44 LBS | OXYGEN SATURATION: 98 % | RESPIRATION RATE: 18 BRPM | SYSTOLIC BLOOD PRESSURE: 137 MMHG | BODY MASS INDEX: 33.66 KG/M2

## 2022-07-26 LAB
ALBUMIN SERPL-MCNC: 3.3 G/DL (ref 3.5–5.2)
ALP BLD-CCNC: 99 U/L (ref 35–104)
ALT SERPL-CCNC: 8 U/L (ref 0–32)
ANION GAP SERPL CALCULATED.3IONS-SCNC: 9 MMOL/L (ref 7–16)
AST SERPL-CCNC: 17 U/L (ref 0–31)
BASOPHILS ABSOLUTE: 0.03 E9/L (ref 0–0.2)
BASOPHILS RELATIVE PERCENT: 0.6 % (ref 0–2)
BILIRUB SERPL-MCNC: 0.2 MG/DL (ref 0–1.2)
BUN BLDV-MCNC: 7 MG/DL (ref 6–20)
CALCIUM SERPL-MCNC: 9 MG/DL (ref 8.6–10.2)
CHLORIDE BLD-SCNC: 106 MMOL/L (ref 98–107)
CO2: 24 MMOL/L (ref 22–29)
CREAT SERPL-MCNC: 0.8 MG/DL (ref 0.5–1)
EOSINOPHILS ABSOLUTE: 0.11 E9/L (ref 0.05–0.5)
EOSINOPHILS RELATIVE PERCENT: 2.1 % (ref 0–6)
GFR AFRICAN AMERICAN: >60
GFR NON-AFRICAN AMERICAN: >60 ML/MIN/1.73
GLUCOSE BLD-MCNC: 97 MG/DL (ref 74–99)
HCT VFR BLD CALC: 36.5 % (ref 34–48)
HEMOGLOBIN: 11.8 G/DL (ref 11.5–15.5)
IMMATURE GRANULOCYTES #: 0.02 E9/L
IMMATURE GRANULOCYTES %: 0.4 % (ref 0–5)
LYMPHOCYTES ABSOLUTE: 1.4 E9/L (ref 1.5–4)
LYMPHOCYTES RELATIVE PERCENT: 27.2 % (ref 20–42)
MCH RBC QN AUTO: 31.3 PG (ref 26–35)
MCHC RBC AUTO-ENTMCNC: 32.3 % (ref 32–34.5)
MCV RBC AUTO: 96.8 FL (ref 80–99.9)
MONOCYTES ABSOLUTE: 0.48 E9/L (ref 0.1–0.95)
MONOCYTES RELATIVE PERCENT: 9.3 % (ref 2–12)
NEUTROPHILS ABSOLUTE: 3.11 E9/L (ref 1.8–7.3)
NEUTROPHILS RELATIVE PERCENT: 60.4 % (ref 43–80)
PDW BLD-RTO: 12.5 FL (ref 11.5–15)
PLATELET # BLD: 213 E9/L (ref 130–450)
PMV BLD AUTO: 11.5 FL (ref 7–12)
POTASSIUM SERPL-SCNC: 3.8 MMOL/L (ref 3.5–5)
RBC # BLD: 3.77 E12/L (ref 3.5–5.5)
SODIUM BLD-SCNC: 139 MMOL/L (ref 132–146)
TOTAL PROTEIN: 6.1 G/DL (ref 6.4–8.3)
WBC # BLD: 5.2 E9/L (ref 4.5–11.5)

## 2022-07-26 PROCEDURE — 6360000002 HC RX W HCPCS

## 2022-07-26 PROCEDURE — 2580000003 HC RX 258: Performed by: INTERNAL MEDICINE

## 2022-07-26 PROCEDURE — 6360000002 HC RX W HCPCS: Performed by: NURSE PRACTITIONER

## 2022-07-26 PROCEDURE — 6370000000 HC RX 637 (ALT 250 FOR IP): Performed by: INTERNAL MEDICINE

## 2022-07-26 PROCEDURE — 2580000003 HC RX 258

## 2022-07-26 PROCEDURE — 85025 COMPLETE CBC W/AUTO DIFF WBC: CPT

## 2022-07-26 PROCEDURE — 99232 SBSQ HOSP IP/OBS MODERATE 35: CPT | Performed by: SURGERY

## 2022-07-26 PROCEDURE — 6370000000 HC RX 637 (ALT 250 FOR IP): Performed by: STUDENT IN AN ORGANIZED HEALTH CARE EDUCATION/TRAINING PROGRAM

## 2022-07-26 PROCEDURE — 80053 COMPREHEN METABOLIC PANEL: CPT

## 2022-07-26 PROCEDURE — 78708 K FLOW/FUNCT IMAGE W/DRUG: CPT

## 2022-07-26 PROCEDURE — 36415 COLL VENOUS BLD VENIPUNCTURE: CPT

## 2022-07-26 PROCEDURE — 3430000000 HC RX DIAGNOSTIC RADIOPHARMACEUTICAL: Performed by: RADIOLOGY

## 2022-07-26 PROCEDURE — 99239 HOSP IP/OBS DSCHRG MGMT >30: CPT | Performed by: INTERNAL MEDICINE

## 2022-07-26 PROCEDURE — 6360000002 HC RX W HCPCS: Performed by: STUDENT IN AN ORGANIZED HEALTH CARE EDUCATION/TRAINING PROGRAM

## 2022-07-26 PROCEDURE — 2580000003 HC RX 258: Performed by: STUDENT IN AN ORGANIZED HEALTH CARE EDUCATION/TRAINING PROGRAM

## 2022-07-26 PROCEDURE — A9562 TC99M MERTIATIDE: HCPCS | Performed by: RADIOLOGY

## 2022-07-26 RX ORDER — FUROSEMIDE 10 MG/ML
10 INJECTION INTRAMUSCULAR; INTRAVENOUS ONCE
Status: COMPLETED | OUTPATIENT
Start: 2022-07-26 | End: 2022-07-26

## 2022-07-26 RX ADMIN — METRONIDAZOLE 500 MG: 500 TABLET ORAL at 08:15

## 2022-07-26 RX ADMIN — FUROSEMIDE 10 MG: 10 INJECTION INTRAMUSCULAR; INTRAVENOUS at 10:05

## 2022-07-26 RX ADMIN — SODIUM CHLORIDE, PRESERVATIVE FREE 10 ML: 5 INJECTION INTRAVENOUS at 08:28

## 2022-07-26 RX ADMIN — CEFEPIME 2000 MG: 2 INJECTION, POWDER, FOR SOLUTION INTRAVENOUS at 01:20

## 2022-07-26 RX ADMIN — CEFEPIME 2000 MG: 2 INJECTION, POWDER, FOR SOLUTION INTRAVENOUS at 08:19

## 2022-07-26 RX ADMIN — Medication 10 MILLICURIE: at 09:31

## 2022-07-26 RX ADMIN — ACETAMINOPHEN 650 MG: 325 TABLET ORAL at 08:25

## 2022-07-26 RX ADMIN — PANTOPRAZOLE SODIUM 40 MG: 40 TABLET, DELAYED RELEASE ORAL at 06:23

## 2022-07-26 RX ADMIN — SODIUM CHLORIDE, PRESERVATIVE FREE 10 ML: 5 INJECTION INTRAVENOUS at 08:15

## 2022-07-26 RX ADMIN — METRONIDAZOLE 500 MG: 500 TABLET ORAL at 00:10

## 2022-07-26 RX ADMIN — SODIUM CHLORIDE, PRESERVATIVE FREE 300 UNITS: 5 INJECTION INTRAVENOUS at 08:15

## 2022-07-26 ASSESSMENT — PAIN SCALES - GENERAL: PAINLEVEL_OUTOF10: 3

## 2022-07-26 ASSESSMENT — PAIN DESCRIPTION - PAIN TYPE: TYPE: ACUTE PAIN

## 2022-07-26 ASSESSMENT — PAIN DESCRIPTION - DESCRIPTORS: DESCRIPTORS: DISCOMFORT

## 2022-07-26 ASSESSMENT — PAIN DESCRIPTION - LOCATION: LOCATION: BACK

## 2022-07-26 ASSESSMENT — PAIN DESCRIPTION - FREQUENCY: FREQUENCY: INTERMITTENT

## 2022-07-26 ASSESSMENT — PAIN DESCRIPTION - ONSET: ONSET: ON-GOING

## 2022-07-26 ASSESSMENT — PAIN DESCRIPTION - ORIENTATION: ORIENTATION: MID

## 2022-07-26 ASSESSMENT — PAIN - FUNCTIONAL ASSESSMENT: PAIN_FUNCTIONAL_ASSESSMENT: ACTIVITIES ARE NOT PREVENTED

## 2022-07-26 NOTE — PLAN OF CARE
Problem: Discharge Planning  Goal: Discharge to home or other facility with appropriate resources  Outcome: Resolved/Met     Problem: Pain  Goal: Verbalizes/displays adequate comfort level or baseline comfort level  Outcome: Resolved/Met     Problem: ABCDS Injury Assessment  Goal: Absence of physical injury  Outcome: Resolved/Met     Problem: Safety - Adult  Goal: Free from fall injury  Outcome: Resolved/Met

## 2022-07-26 NOTE — PROGRESS NOTES
GENERAL SURGERY  DAILY PROGRESS NOTE  7/26/2022    CC: abdominal pain      Subjective:  Patient with good pain control. She got PICC. Objective:  /83   Pulse (!) 44   Temp 98.3 °F (36.8 °C) (Oral)   Resp 18   Ht 5' 6\" (1.676 m)   Wt 209 lb 7 oz (95 kg)   SpO2 98%   BMI 33.80 kg/m²     GENERAL:  Laying in bed, awake, alert, cooperative, no apparent distress  HEAD: Normocephalic, atraumatic  EYES: No sclera icterus, pupils equal  LUNGS:  No increased work of breathing  CARDIOVASCULAR:  RR  ABDOMEN:  Soft, mild LLQ tenderness, non-distended  EXTREMITIES: No edema or swelling  SKIN: Warm and dry    Assessment/Plan:  36 y.o. female with sigmoid diverticulitis     Continue antibiotics per ID  Monitor abdominal exam closely  Diet - low fiber diet  Will need outpatient colonoscopy  Urology team recommending MAG3 renal Lasix scan  [facility] for DC from surgical perspective once antibiotics finalized. Electronically signed by Idalia Phelan MD on 7/26/2022 at 7:10 AM    Attending Physician Statement:    Chief Complaint: diverticulitis      I have examined the patient and performed the key aspects of physical exam, reviewed the record (including all pertinent and new radiology images and laboratory findings), and discussed the case with the surgical team.  I agree with the assessment and plan with the following additions, corrections, and changes. 14pt review of symptoms completed and negative except as mentioned. Feels much better. Just some discomfort on palpation. Tolerating diet. Okay to dc and followup in office    Javier Beach MD  07/26/22  12:30 PM    NOTE: This report, in part or full, may have been transcribed using voice recognition software. Every effort was made to ensure accuracy; however, inadvertent computerized transcription errors may be present. Please excuse any transcriptional grammatical or spelling errors that may have escaped my editorial review.

## 2022-07-26 NOTE — DISCHARGE SUMMARY
[facility] Physician Discharge Summary       Queenie Vasquez MD  [address] Ann  [phone]    Schedule an appointment as soon as possible for a visit in 2 week(s)  colonoscopy    [facility] [place] [address] [place] [address], [place] [address] [place] [address] 80  [address] [facility]    Go on 8/17/2022        Activity level: As tolerated  Dispo: Home  Condition on discharge: Stable    Patient ID:  Ratna Dumont  [id number]  34 y.o.  1980    Admit date: 7/20/2022    Discharge date and time:  7/26/2022  8:44 AM    Admission Diagnoses: Principal Problem:    Diverticulitis  Active Problems:    Diverticulitis of intestine with abscess    Hydronephrosis    Psoriatic arthritis ([address]    Gastroesophageal reflux disease  Resolved Problems:    * No resolved hospital problems. *      Discharge Diagnoses: Principal Problem:    Diverticulitis  Active Problems:    Diverticulitis of intestine with abscess    Hydronephrosis    Psoriatic arthritis ([address]    Gastroesophageal reflux disease  Resolved Problems:    * No resolved hospital problems. *      Consults:  IP CONSULT TO UROLOGY  IP CONSULT TO GENERAL SURGERY  IP CONSULT TO INFECTIOUS DISEASES  IP CONSULT TO SOCIAL WORK  IP CONSULT TO [address] Course:   Patient Ratna Dumont is a 36 y.o. presented with Diverticulitis []    36year old female who saw outpatient GI and was placed on Omnicef for abdominal pain. [facility]itis with abscess so she was given Merrem and transferred to [facility]. Surgery noted abscess is too small to drain. ID consulted and started on Cefepime + Flagyl x 3 weeks minimum with PICC line. She will also get Diflucan PRN. Should follow up with surgery for repeat CT A/P and colonoscopy.  During hospital stay, she was found to have left sided hydronephrosis likely secondary to inflammatory process from diverticulitis. Creatinine stable during admission. Per urology, MAG3 renal Lasix scan to be done and if obstruction present will need outpatient cystoscopy and follow up with urology. Discharge Exam:    General Appearance: alert and oriented to person, place and time and in no acute distress  Skin: warm and dry  Head: normocephalic and atraumatic  Eyes: pupils equal, round, and reactive to light, extraocular eye movements intact, conjunctivae normal  Neck: neck supple and non tender without mass   Pulmonary/Chest: clear to auscultation bilaterally- no wheezes, rales or rhonchi, normal air movement, no respiratory distress  Cardiovascular: normal rate, normal S1 and S2 and no carotid bruits  Abdomen: soft, non-tender, non-distended, normal bowel sounds, no masses or organomegaly  Extremities: no cyanosis, no clubbing and no edema  Neurologic: no cranial nerve deficit and speech normal  Right picc     I/O last 3 completed shifts: In: 13 [I.V.:13]  Out: -   No intake/output data recorded. LABS:  Recent Labs     07/24/22 0523 07/25/22 0138 07/26/22  0630    139 139   K 3.9 3.5 3.8    106 106   CO2 25 23 24   BUN 4* 4* 7   CREATININE 0.7 0.7 0.8   GLUCOSE 103* 118* 97   CALCIUM 8.7 8.9 9.0       Recent Labs     07/24/22 0523 07/25/22 0138 07/26/22  0630   WBC 6.3 6.2 5.2   RBC 3.52 3.70 3.77   HGB 10.8* 11.5 11.8   HCT 34.0 35.6 36.5   MCV 96.6 96.2 96.8   MCH 30.7 31.1 31.3   MCHC 31.8* 32.3 32.3   RDW 12.4 12.3 12.5    217 213   MPV 10.9 11.1 11.5       No results for input(s): POCGLU in the last 72 hours. Imaging:  CT ABDOMEN PELVIS W IV CONTRAST Additional Contrast? None    Result Date: 7/21/2022  EXAMINATION: CT OF THE ABDOMEN AND PELVIS WITH CONTRAST 7/21/2022 10:11 am TECHNIQUE: CT of the abdomen and pelvis was performed with the administration of intravenous contrast. Multiplanar reformatted images are provided for review.  Automated exposure control, iterative reconstruction, and/or weight based adjustment of the mA/kV was utilized to reduce the radiation dose to as low as reasonably achievable. COMPARISON: 07/19/2022 outside images HISTORY: ORDERING SYSTEM PROVIDED HISTORY: diverticular abscess TECHNOLOGIST PROVIDED HISTORY: Reason for exam:->diverticular abscess Additional Contrast?->None FINDINGS: Lower Chest:  Visualized portion of the lower chest demonstrates no acute abnormality. Organs: Liver and spleen are normal in size without focal lesion. Clips are noted in the gallbladder fossa. Pancreas appears normal.  Common bile duct is normal.  Stomach shows a small sliding-type hiatal hernia. Note is made of left renal caliectasis and hydroureter presumably secondary to the significant inflammation in the pelvis. Right renal collecting system is not dilated. Normal renal cortical enhancement. No renal calculi. GI/Bowel: Bowel within the upper abdomen appears normal.  Significant inflammation of the rectosigmoid colon is again noted. There is moderate left colon and sigmoid diverticulosis. The appendix is normal. There is a small amount of fluid in the pelvic cul-de-sac compatible with small abscess measuring 3.3 x 2.6 cm on image number 165 note is made that there is an adjacent loop of inflamed small bowel within the pelvic cul-de-sac. Pelvis: Simple appearing functional cyst of the right ovary is again noted measuring 5.6 x 4.2 cm. Left ovary is normal.  Uterus is surgically absent. None Peritoneum/Retroperitoneum: No retroperitoneal masses are identified. Nonenlarged para-aortic and aortocaval lymph nodes are noted likely reactive in nature. Aorta is nonaneurysmal. Bones/Soft Tissues: Osseous structures are within normal limits. Subcutaneous tissues appear generally normal.  There is a small fat containing umbilical hernia. 1.  Small fluid collection in the pelvic cul-de-sac measuring 3.3 x 2.6 cm compatible with an abscess.   Please note there is an adjacent inflamed small bowel loop in the pelvic cul-de-sac. 2.  Persistent wall thickening and significant inflammation surrounding the rectosigmoid colon compatible with colitis or diverticulitis. 3.  Left hydronephrosis and hydroureter likely related to the inflammatory change in the pelvis. No calculi detected. Patient Instructions:     HOLD METHOTREXATE AND CELEBREX DURING TIME ON ANTIBIOTICS. IF YOU HAVE A FLARE UP OF RA, CALL DOCTOR AND SEE IF IT IS OK TO RESUME. OTHERWISE, TALK TO PCP AFTER PICC LINE REMOVED. Medication List        START taking these medications      cefepime  infusion  Commonly known as: MAXIPIME  Infuse 2,000 mg intravenously in the morning and 2,000 mg at noon and 2,000 mg in the evening. Do all this for 21 days. Compound per protocol. fluconazole 150 MG tablet  Commonly known as: Diflucan  Take 1 tablet by mouth as needed (Take one tablet as needed for yeast infection)            CHANGE how you take these medications      metroNIDAZOLE 500 MG tablet  Commonly known as: FLAGYL  Take 1 tablet by mouth in the morning and 1 tablet at noon and 1 tablet before bedtime. Do all this for 21 days.   What changed: when to take this            CONTINUE taking these medications      celecoxib 200 MG capsule  Commonly known as: CELEBREX     cetirizine 10 MG tablet  Commonly known as: ZYRTEC     cyanocobalamin 1000 MCG/ML injection     dicyclomine 10 MG capsule  Commonly known as: BENTYL     folic acid 1 MG tablet  Commonly known as: FOLVITE     methotrexate Sodium 50 MG/2ML chemo injection     omeprazole 20 MG delayed release capsule  Commonly known as: PRILOSEC     ondansetron 4 MG tablet  Commonly known as: Brian Gibson            STOP taking these medications      ciprofloxacin 500 MG tablet  Commonly known as: CIPRO               Where to Get Your Medications        You can get these medications from any pharmacy    Bring a paper prescription for each of these medications  cefepime  infusion  fluconazole 150 MG tablet  metroNIDAZOLE 500 MG tablet       35 minutes was spent in preparing discharge papers, discussing discharge with patient, medication review, etc.    Signed:  Electronically signed by Creighton Goldberg, APRN - NP on 7/26/2022 at 8:44 AM

## 2022-07-26 NOTE — CARE COORDINATION
Social Work/Discharge Planning:  Chart reviewed. Anticipate for patient to discharge home today. [address] [facility] will see patient today for her 4:00pm dose. Will continue to follow. Electronically signed by DOREEN Purvis on 7/26/2022 at 7:39 AM    Addendum:  Discharge order noted. [facility]   Electronically signed by DOREEN Purvis on 7/26/2022 at 8:54 AM

## 2022-08-04 LAB
ALBUMIN SERPL-MCNC: 3.9 G/DL (ref 3.5–5.2)
ALP BLD-CCNC: 59 U/L (ref 35–104)
ALT SERPL-CCNC: 5 U/L (ref 0–32)
ANION GAP SERPL CALCULATED.3IONS-SCNC: 12 MMOL/L (ref 7–16)
AST SERPL-CCNC: 22 U/L (ref 0–31)
BASOPHILS ABSOLUTE: 0.04 E9/L (ref 0–0.2)
BASOPHILS RELATIVE PERCENT: 0.8 % (ref 0–2)
BILIRUB SERPL-MCNC: 0.2 MG/DL (ref 0–1.2)
BUN BLDV-MCNC: 7 MG/DL (ref 6–20)
CALCIUM SERPL-MCNC: 9.1 MG/DL (ref 8.6–10.2)
CHLORIDE BLD-SCNC: 106 MMOL/L (ref 98–107)
CO2: 24 MMOL/L (ref 22–29)
CREAT SERPL-MCNC: 0.6 MG/DL (ref 0.5–1)
EOSINOPHILS ABSOLUTE: 0.13 E9/L (ref 0.05–0.5)
EOSINOPHILS RELATIVE PERCENT: 2.8 % (ref 0–6)
GFR AFRICAN AMERICAN: >60
GFR NON-AFRICAN AMERICAN: >60 ML/MIN/1.73
GLUCOSE BLD-MCNC: 97 MG/DL (ref 74–99)
HCT VFR BLD CALC: 38.9 % (ref 34–48)
HEMOGLOBIN: 12.3 G/DL (ref 11.5–15.5)
IMMATURE GRANULOCYTES #: 0.01 E9/L
IMMATURE GRANULOCYTES %: 0.2 % (ref 0–5)
LYMPHOCYTES ABSOLUTE: 1.39 E9/L (ref 1.5–4)
LYMPHOCYTES RELATIVE PERCENT: 29.5 % (ref 20–42)
MCH RBC QN AUTO: 30.5 PG (ref 26–35)
MCHC RBC AUTO-ENTMCNC: 31.6 % (ref 32–34.5)
MCV RBC AUTO: 96.5 FL (ref 80–99.9)
MONOCYTES ABSOLUTE: 0.44 E9/L (ref 0.1–0.95)
MONOCYTES RELATIVE PERCENT: 9.3 % (ref 2–12)
NEUTROPHILS ABSOLUTE: 2.7 E9/L (ref 1.8–7.3)
NEUTROPHILS RELATIVE PERCENT: 57.4 % (ref 43–80)
PDW BLD-RTO: 13.2 FL (ref 11.5–15)
PLATELET # BLD: 159 E9/L (ref 130–450)
PMV BLD AUTO: 13.1 FL (ref 7–12)
POTASSIUM SERPL-SCNC: 3.8 MMOL/L (ref 3.5–5)
RBC # BLD: 4.03 E12/L (ref 3.5–5.5)
SODIUM BLD-SCNC: 142 MMOL/L (ref 132–146)
TOTAL PROTEIN: 6.6 G/DL (ref 6.4–8.3)
WBC # BLD: 4.7 E9/L (ref 4.5–11.5)

## 2022-08-11 LAB
ALBUMIN SERPL-MCNC: 3.6 G/DL (ref 3.5–5.2)
ALP BLD-CCNC: 60 U/L (ref 35–104)
ALT SERPL-CCNC: 11 U/L (ref 0–32)
ANION GAP SERPL CALCULATED.3IONS-SCNC: 16 MMOL/L (ref 7–16)
AST SERPL-CCNC: 24 U/L (ref 0–31)
BASOPHILS ABSOLUTE: 0.04 E9/L (ref 0–0.2)
BASOPHILS RELATIVE PERCENT: 0.8 % (ref 0–2)
BILIRUB SERPL-MCNC: <0.2 MG/DL (ref 0–1.2)
BUN BLDV-MCNC: 8 MG/DL (ref 6–20)
CALCIUM SERPL-MCNC: 9.3 MG/DL (ref 8.6–10.2)
CHLORIDE BLD-SCNC: 105 MMOL/L (ref 98–107)
CO2: 20 MMOL/L (ref 22–29)
CREAT SERPL-MCNC: 0.7 MG/DL (ref 0.5–1)
EOSINOPHILS ABSOLUTE: 0.11 E9/L (ref 0.05–0.5)
EOSINOPHILS RELATIVE PERCENT: 2.1 % (ref 0–6)
GFR AFRICAN AMERICAN: >60
GFR NON-AFRICAN AMERICAN: >60 ML/MIN/1.73
GLUCOSE BLD-MCNC: 71 MG/DL (ref 74–99)
HCT VFR BLD CALC: 38.4 % (ref 34–48)
HEMOGLOBIN: 12 G/DL (ref 11.5–15.5)
IMMATURE GRANULOCYTES #: 0.02 E9/L
IMMATURE GRANULOCYTES %: 0.4 % (ref 0–5)
LYMPHOCYTES ABSOLUTE: 1.37 E9/L (ref 1.5–4)
LYMPHOCYTES RELATIVE PERCENT: 26.8 % (ref 20–42)
MCH RBC QN AUTO: 31 PG (ref 26–35)
MCHC RBC AUTO-ENTMCNC: 31.3 % (ref 32–34.5)
MCV RBC AUTO: 99.2 FL (ref 80–99.9)
MONOCYTES ABSOLUTE: 0.49 E9/L (ref 0.1–0.95)
MONOCYTES RELATIVE PERCENT: 9.6 % (ref 2–12)
NEUTROPHILS ABSOLUTE: 3.09 E9/L (ref 1.8–7.3)
NEUTROPHILS RELATIVE PERCENT: 60.3 % (ref 43–80)
PDW BLD-RTO: 13.8 FL (ref 11.5–15)
PLATELET # BLD: 119 E9/L (ref 130–450)
PMV BLD AUTO: 12.8 FL (ref 7–12)
POTASSIUM SERPL-SCNC: 4 MMOL/L (ref 3.5–5)
RBC # BLD: 3.87 E12/L (ref 3.5–5.5)
SODIUM BLD-SCNC: 141 MMOL/L (ref 132–146)
TOTAL PROTEIN: 6.6 G/DL (ref 6.4–8.3)
WBC # BLD: 5.1 E9/L (ref 4.5–11.5)

## 2022-08-15 ENCOUNTER — HOSPITAL ENCOUNTER (OUTPATIENT)
Dept: CT IMAGING | Age: 42
Discharge: HOME | End: 2022-08-17
Payer: MEDICAID

## 2022-08-15 DIAGNOSIS — K57.20 COLONIC DIVERTICULAR ABSCESS: ICD-10-CM

## 2022-08-15 PROCEDURE — 6360000004 HC RX CONTRAST MEDICATION: Performed by: RADIOLOGY

## 2022-08-15 PROCEDURE — 74176 CT ABD & PELVIS W/O CONTRAST: CPT

## 2022-08-15 RX ADMIN — IOHEXOL 50 ML: 240 INJECTION, SOLUTION INTRATHECAL; INTRAVASCULAR; INTRAVENOUS; ORAL at 12:12

## 2022-08-26 ENCOUNTER — OFFICE VISIT (OUTPATIENT)
Dept: SURGERY | Age: 42
End: 2022-08-26
Payer: MEDICAID

## 2022-08-26 VITALS
SYSTOLIC BLOOD PRESSURE: 104 MMHG | TEMPERATURE: 96.9 F | HEART RATE: 71 BPM | BODY MASS INDEX: 31.4 KG/M2 | WEIGHT: 195.4 LBS | DIASTOLIC BLOOD PRESSURE: 68 MMHG | OXYGEN SATURATION: 97 % | HEIGHT: 66 IN

## 2022-08-26 DIAGNOSIS — K57.92 DIVERTICULITIS: Primary | ICD-10-CM

## 2022-08-26 PROCEDURE — G8417 CALC BMI ABV UP PARAM F/U: HCPCS | Performed by: SURGERY

## 2022-08-26 PROCEDURE — 99213 OFFICE O/P EST LOW 20 MIN: CPT | Performed by: SURGERY

## 2022-08-26 PROCEDURE — 4004F PT TOBACCO SCREEN RCVD TLK: CPT | Performed by: SURGERY

## 2022-08-26 PROCEDURE — G8427 DOCREV CUR MEDS BY ELIG CLIN: HCPCS | Performed by: SURGERY

## 2022-08-26 NOTE — PROGRESS NOTES
[address] [facility] Surgery Clinic Note    Assessment/Plan:     Diagnosis Orders   1. Diverticulitis      Given severity of this episode as well as being her second we discussed consideration of resection. She like to think about it and follow-up          Return in about 3 months (around 11/26/2022). Chief Complaint   Patient presents with    Follow-Up from Hospital       PCP: Juju Hearn MD    HPI: Gregory River is a 36 y.o. female here for hospital follow-up for diverticulitis. She is doing much better. She has minimal pain and discomfort intermittently. Her bowels are moving normally. She has no nausea or vomiting. She is eating well. She not had any fevers. She finished her IV antibiotics and was transitioned to orals. She said she had a reaction to cefdinir and stopped that but did finish the Flagyl. Her last colonoscopy was about 2 years ago. She had a polyp removed and was recommended for 5 years she states. A follow-up CT from about 2 weeks ago shows significant improvement in the inflammatory process. Since then she has had no significant symptoms or fevers        Review of Systems   All other systems reviewed and are negative. The remainder of the past medical, past surgical, family, and psychosocial history, as well as medication and allergy review, were completed and are as documented elsewhere in the chart. Objective:  Vitals:    08/26/22 0817   BP: 104/68   Pulse: 71   Temp: 96.9 °F (36.1 °C)   TempSrc: Temporal   SpO2: 97%   Weight: 195 lb 6.4 oz (88.6 kg)   Height: 5' 6\" (1.676 m)          Physical Exam  Constitutional:       General: She is not in acute distress. Appearance: She is not diaphoretic. Cardiovascular:      Rate and Rhythm: Normal rate. Pulmonary:      Effort: Pulmonary effort is normal. No respiratory distress. Abdominal:      General: There is no distension. Palpations: Abdomen is soft. Tenderness: There is no abdominal tenderness. There is no guarding or rebound. Reffaby Steinberg MD      NOTE: This report, in part or full, may have been transcribed using voice recognition software. Every effort was made to ensure accuracy; however, inadvertent computerized transcription errors may be present. Please excuse any transcriptional grammatical or spelling errors that may have escaped my editorial review.       CC: Jacob Lowry MD, Dr. Debbe Buerger

## 2022-09-02 PROCEDURE — 8E0ZXY6 ISOLATION: ICD-10-PCS | Performed by: INTERNAL MEDICINE

## 2022-09-02 PROCEDURE — 99285 EMERGENCY DEPT VISIT HI MDM: CPT

## 2022-09-02 PROCEDURE — 96374 THER/PROPH/DIAG INJ IV PUSH: CPT

## 2022-09-02 ASSESSMENT — PAIN DESCRIPTION - LOCATION: LOCATION: ABDOMEN

## 2022-09-02 ASSESSMENT — PAIN SCALES - GENERAL: PAINLEVEL_OUTOF10: 6

## 2022-09-02 ASSESSMENT — PAIN - FUNCTIONAL ASSESSMENT: PAIN_FUNCTIONAL_ASSESSMENT: 0-10

## 2022-09-03 ENCOUNTER — APPOINTMENT (OUTPATIENT)
Dept: CT IMAGING | Age: 42
DRG: 244 | End: 2022-09-03
Payer: MEDICAID

## 2022-09-03 ENCOUNTER — HOSPITAL ENCOUNTER (INPATIENT)
Age: 42
LOS: 6 days | Discharge: HOME HEALTH | DRG: 244 | End: 2022-09-09
Attending: STUDENT IN AN ORGANIZED HEALTH CARE EDUCATION/TRAINING PROGRAM | Admitting: INTERNAL MEDICINE
Payer: MEDICAID

## 2022-09-03 DIAGNOSIS — K57.32 DIVERTICULITIS OF COLON: Primary | ICD-10-CM

## 2022-09-03 DIAGNOSIS — R77.8 ELEVATED TROPONIN: ICD-10-CM

## 2022-09-03 DIAGNOSIS — U07.1 COVID-19: ICD-10-CM

## 2022-09-03 PROBLEM — Z72.0 TOBACCO ABUSE: Status: ACTIVE | Noted: 2022-09-03

## 2022-09-03 PROBLEM — R79.89 TROPONIN I ABOVE REFERENCE RANGE: Status: ACTIVE | Noted: 2022-09-03

## 2022-09-03 LAB
ALBUMIN SERPL-MCNC: 3.9 G/DL (ref 3.5–5.2)
ALP BLD-CCNC: 72 U/L (ref 35–104)
ALT SERPL-CCNC: 17 U/L (ref 0–32)
ANION GAP SERPL CALCULATED.3IONS-SCNC: 8 MMOL/L (ref 7–16)
AST SERPL-CCNC: 22 U/L (ref 0–31)
BACTERIA: ABNORMAL /HPF
BASOPHILS ABSOLUTE: 0.01 E9/L (ref 0–0.2)
BASOPHILS RELATIVE PERCENT: 0.2 % (ref 0–2)
BILIRUB SERPL-MCNC: 0.6 MG/DL (ref 0–1.2)
BILIRUBIN URINE: NEGATIVE
BLOOD, URINE: ABNORMAL
BUN BLDV-MCNC: 7 MG/DL (ref 6–20)
CALCIUM SERPL-MCNC: 9.2 MG/DL (ref 8.6–10.2)
CHLORIDE BLD-SCNC: 106 MMOL/L (ref 98–107)
CHOLESTEROL, FASTING: 132 MG/DL (ref 0–199)
CHOLESTEROL, TOTAL: 132 MG/DL (ref 0–199)
CLARITY: CLEAR
CO2: 26 MMOL/L (ref 22–29)
COLOR: YELLOW
CREAT SERPL-MCNC: 0.6 MG/DL (ref 0.5–1)
EKG ATRIAL RATE: 55 BPM
EKG P AXIS: 31 DEGREES
EKG P-R INTERVAL: 162 MS
EKG Q-T INTERVAL: 500 MS
EKG QRS DURATION: 90 MS
EKG QTC CALCULATION (BAZETT): 478 MS
EKG R AXIS: 34 DEGREES
EKG T AXIS: 40 DEGREES
EKG VENTRICULAR RATE: 55 BPM
EOSINOPHILS ABSOLUTE: 0.03 E9/L (ref 0.05–0.5)
EOSINOPHILS RELATIVE PERCENT: 0.5 % (ref 0–6)
EPITHELIAL CELLS, UA: ABNORMAL /HPF
GFR AFRICAN AMERICAN: >60
GFR NON-AFRICAN AMERICAN: >60 ML/MIN/1.73
GLUCOSE BLD-MCNC: 102 MG/DL (ref 74–99)
GLUCOSE URINE: NEGATIVE MG/DL
HBA1C MFR BLD: 5.1 % (ref 4–5.6)
HCG(URINE) PREGNANCY TEST: NEGATIVE
HCT VFR BLD CALC: 37.7 % (ref 34–48)
HDLC SERPL-MCNC: 34 MG/DL
HDLC SERPL-MCNC: 34 MG/DL
HEMOGLOBIN: 12.5 G/DL (ref 11.5–15.5)
IMMATURE GRANULOCYTES #: 0.02 E9/L
IMMATURE GRANULOCYTES %: 0.3 % (ref 0–5)
INFLUENZA A BY PCR: NOT DETECTED
INFLUENZA B BY PCR: NOT DETECTED
INR BLD: 1.1
KETONES, URINE: NEGATIVE MG/DL
LACTIC ACID, SEPSIS: 0.9 MMOL/L (ref 0.5–1.9)
LDL CHOLESTEROL CALCULATED: 77 MG/DL (ref 0–99)
LDL CHOLESTEROL CALCULATED: 77 MG/DL (ref 0–99)
LEUKOCYTE ESTERASE, URINE: NEGATIVE
LIPASE: 14 U/L (ref 13–60)
LYMPHOCYTES ABSOLUTE: 1.65 E9/L (ref 1.5–4)
LYMPHOCYTES RELATIVE PERCENT: 25.6 % (ref 20–42)
MAGNESIUM: 2.1 MG/DL (ref 1.6–2.6)
MCH RBC QN AUTO: 30 PG (ref 26–35)
MCHC RBC AUTO-ENTMCNC: 33.2 % (ref 32–34.5)
MCV RBC AUTO: 90.6 FL (ref 80–99.9)
MONOCYTES ABSOLUTE: 0.55 E9/L (ref 0.1–0.95)
MONOCYTES RELATIVE PERCENT: 8.5 % (ref 2–12)
NEUTROPHILS ABSOLUTE: 4.19 E9/L (ref 1.8–7.3)
NEUTROPHILS RELATIVE PERCENT: 64.9 % (ref 43–80)
NITRITE, URINE: NEGATIVE
PDW BLD-RTO: 13.2 FL (ref 11.5–15)
PH UA: 6.5 (ref 5–9)
PLATELET # BLD: 159 E9/L (ref 130–450)
PMV BLD AUTO: 12.1 FL (ref 7–12)
POTASSIUM REFLEX MAGNESIUM: 4.1 MMOL/L (ref 3.5–5)
PROTEIN UA: NEGATIVE MG/DL
PROTHROMBIN TIME: 12.4 SEC (ref 9.3–12.4)
RBC # BLD: 4.16 E12/L (ref 3.5–5.5)
RBC # BLD: NORMAL 10*6/UL
RBC UA: ABNORMAL /HPF (ref 0–2)
SARS-COV-2, NAAT: DETECTED
SODIUM BLD-SCNC: 140 MMOL/L (ref 132–146)
SPECIFIC GRAVITY UA: 1.01 (ref 1–1.03)
T4 FREE: 1.34 NG/DL (ref 0.93–1.7)
TOTAL CK: 50 U/L (ref 20–180)
TOTAL PROTEIN: 6.5 G/DL (ref 6.4–8.3)
TRIGL SERPL-MCNC: 104 MG/DL (ref 0–149)
TRIGLYCERIDE, FASTING: 104 MG/DL (ref 0–149)
TROPONIN, HIGH SENSITIVITY: 149 NG/L (ref 0–9)
TROPONIN, HIGH SENSITIVITY: 182 NG/L (ref 0–9)
TROPONIN, HIGH SENSITIVITY: 187 NG/L (ref 0–9)
TROPONIN, HIGH SENSITIVITY: 226 NG/L (ref 0–9)
TSH SERPL DL<=0.05 MIU/L-ACNC: 0.41 UIU/ML (ref 0.27–4.2)
UROBILINOGEN, URINE: 0.2 E.U./DL
VLDLC SERPL CALC-MCNC: 21 MG/DL
VLDLC SERPL CALC-MCNC: 21 MG/DL
WBC # BLD: 6.5 E9/L (ref 4.5–11.5)
WBC UA: ABNORMAL /HPF (ref 0–5)

## 2022-09-03 PROCEDURE — 6360000002 HC RX W HCPCS

## 2022-09-03 PROCEDURE — 6360000004 HC RX CONTRAST MEDICATION: Performed by: RADIOLOGY

## 2022-09-03 PROCEDURE — 81001 URINALYSIS AUTO W/SCOPE: CPT

## 2022-09-03 PROCEDURE — 83735 ASSAY OF MAGNESIUM: CPT

## 2022-09-03 PROCEDURE — 83036 HEMOGLOBIN GLYCOSYLATED A1C: CPT

## 2022-09-03 PROCEDURE — 99254 IP/OBS CNSLTJ NEW/EST MOD 60: CPT | Performed by: INTERNAL MEDICINE

## 2022-09-03 PROCEDURE — 82550 ASSAY OF CK (CPK): CPT

## 2022-09-03 PROCEDURE — 83605 ASSAY OF LACTIC ACID: CPT

## 2022-09-03 PROCEDURE — 80053 COMPREHEN METABOLIC PANEL: CPT

## 2022-09-03 PROCEDURE — 81025 URINE PREGNANCY TEST: CPT

## 2022-09-03 PROCEDURE — 85025 COMPLETE CBC W/AUTO DIFF WBC: CPT

## 2022-09-03 PROCEDURE — 2580000003 HC RX 258: Performed by: INTERNAL MEDICINE

## 2022-09-03 PROCEDURE — 87088 URINE BACTERIA CULTURE: CPT

## 2022-09-03 PROCEDURE — 74177 CT ABD & PELVIS W/CONTRAST: CPT

## 2022-09-03 PROCEDURE — 84443 ASSAY THYROID STIM HORMONE: CPT

## 2022-09-03 PROCEDURE — 93005 ELECTROCARDIOGRAM TRACING: CPT | Performed by: STUDENT IN AN ORGANIZED HEALTH CARE EDUCATION/TRAINING PROGRAM

## 2022-09-03 PROCEDURE — 87502 INFLUENZA DNA AMP PROBE: CPT

## 2022-09-03 PROCEDURE — APPSS60 APP SPLIT SHARED TIME 46-60 MINUTES: Performed by: NURSE PRACTITIONER

## 2022-09-03 PROCEDURE — 84484 ASSAY OF TROPONIN QUANT: CPT

## 2022-09-03 PROCEDURE — 99223 1ST HOSP IP/OBS HIGH 75: CPT | Performed by: INTERNAL MEDICINE

## 2022-09-03 PROCEDURE — 83690 ASSAY OF LIPASE: CPT

## 2022-09-03 PROCEDURE — 2060000000 HC ICU INTERMEDIATE R&B

## 2022-09-03 PROCEDURE — 80061 LIPID PANEL: CPT

## 2022-09-03 PROCEDURE — 84439 ASSAY OF FREE THYROXINE: CPT

## 2022-09-03 PROCEDURE — 87635 SARS-COV-2 COVID-19 AMP PRB: CPT

## 2022-09-03 PROCEDURE — 85610 PROTHROMBIN TIME: CPT

## 2022-09-03 RX ORDER — ACETAMINOPHEN 325 MG/1
650 TABLET ORAL EVERY 6 HOURS PRN
Status: DISCONTINUED | OUTPATIENT
Start: 2022-09-03 | End: 2022-09-09 | Stop reason: HOSPADM

## 2022-09-03 RX ORDER — DEXTROSE AND SODIUM CHLORIDE 5; .45 G/100ML; G/100ML
INJECTION, SOLUTION INTRAVENOUS CONTINUOUS
Status: DISCONTINUED | OUTPATIENT
Start: 2022-09-03 | End: 2022-09-08

## 2022-09-03 RX ORDER — SODIUM CHLORIDE 9 MG/ML
INJECTION, SOLUTION INTRAVENOUS PRN
Status: DISCONTINUED | OUTPATIENT
Start: 2022-09-03 | End: 2022-09-07 | Stop reason: SDUPTHER

## 2022-09-03 RX ORDER — ENOXAPARIN SODIUM 100 MG/ML
40 INJECTION SUBCUTANEOUS DAILY
Status: DISCONTINUED | OUTPATIENT
Start: 2022-09-03 | End: 2022-09-04

## 2022-09-03 RX ORDER — POLYETHYLENE GLYCOL 3350 17 G/17G
17 POWDER, FOR SOLUTION ORAL DAILY PRN
Status: DISCONTINUED | OUTPATIENT
Start: 2022-09-03 | End: 2022-09-09 | Stop reason: HOSPADM

## 2022-09-03 RX ORDER — SODIUM CHLORIDE 0.9 % (FLUSH) 0.9 %
5-40 SYRINGE (ML) INJECTION PRN
Status: DISCONTINUED | OUTPATIENT
Start: 2022-09-03 | End: 2022-09-07 | Stop reason: SDUPTHER

## 2022-09-03 RX ORDER — ONDANSETRON 2 MG/ML
4 INJECTION INTRAMUSCULAR; INTRAVENOUS EVERY 6 HOURS PRN
Status: DISCONTINUED | OUTPATIENT
Start: 2022-09-03 | End: 2022-09-09 | Stop reason: HOSPADM

## 2022-09-03 RX ORDER — DOCUSATE SODIUM 100 MG/1
100 CAPSULE, LIQUID FILLED ORAL DAILY
COMMUNITY

## 2022-09-03 RX ORDER — ACETAMINOPHEN 650 MG/1
650 SUPPOSITORY RECTAL EVERY 6 HOURS PRN
Status: DISCONTINUED | OUTPATIENT
Start: 2022-09-03 | End: 2022-09-09 | Stop reason: HOSPADM

## 2022-09-03 RX ORDER — SODIUM CHLORIDE 0.9 % (FLUSH) 0.9 %
5-40 SYRINGE (ML) INJECTION EVERY 12 HOURS SCHEDULED
Status: DISCONTINUED | OUTPATIENT
Start: 2022-09-03 | End: 2022-09-07 | Stop reason: SDUPTHER

## 2022-09-03 RX ORDER — KETOROLAC TROMETHAMINE 30 MG/ML
15 INJECTION, SOLUTION INTRAMUSCULAR; INTRAVENOUS ONCE
Status: COMPLETED | OUTPATIENT
Start: 2022-09-03 | End: 2022-09-03

## 2022-09-03 RX ORDER — ONDANSETRON 4 MG/1
4 TABLET, ORALLY DISINTEGRATING ORAL EVERY 8 HOURS PRN
Status: DISCONTINUED | OUTPATIENT
Start: 2022-09-03 | End: 2022-09-09 | Stop reason: HOSPADM

## 2022-09-03 RX ADMIN — IOPAMIDOL 75 ML: 755 INJECTION, SOLUTION INTRAVENOUS at 04:37

## 2022-09-03 RX ADMIN — KETOROLAC TROMETHAMINE 15 MG: 30 INJECTION, SOLUTION INTRAMUSCULAR; INTRAVENOUS at 03:58

## 2022-09-03 RX ADMIN — SODIUM CHLORIDE, PRESERVATIVE FREE 10 ML: 5 INJECTION INTRAVENOUS at 21:39

## 2022-09-03 RX ADMIN — DEXTROSE AND SODIUM CHLORIDE: 5; 450 INJECTION, SOLUTION INTRAVENOUS at 21:42

## 2022-09-03 RX ADMIN — DEXTROSE AND SODIUM CHLORIDE: 5; 450 INJECTION, SOLUTION INTRAVENOUS at 11:06

## 2022-09-03 ASSESSMENT — ENCOUNTER SYMPTOMS
VOMITING: 1
NAUSEA: 1
SHORTNESS OF BREATH: 0
RHINORRHEA: 0
SORE THROAT: 0
COUGH: 0
CONSTIPATION: 0
CHEST TIGHTNESS: 0
NAUSEA: 0
EYE PAIN: 0
DIARRHEA: 1
ABDOMINAL PAIN: 1

## 2022-09-03 ASSESSMENT — PAIN DESCRIPTION - DESCRIPTORS: DESCRIPTORS: DISCOMFORT;SHOOTING

## 2022-09-03 ASSESSMENT — PAIN DESCRIPTION - LOCATION
LOCATION: ABDOMEN
LOCATION: ABDOMEN

## 2022-09-03 ASSESSMENT — PAIN SCALES - GENERAL
PAINLEVEL_OUTOF10: 2
PAINLEVEL_OUTOF10: 4

## 2022-09-03 ASSESSMENT — PAIN DESCRIPTION - ORIENTATION: ORIENTATION: LEFT

## 2022-09-03 ASSESSMENT — LIFESTYLE VARIABLES: HOW OFTEN DO YOU HAVE A DRINK CONTAINING ALCOHOL: NEVER

## 2022-09-03 NOTE — CONSULTS
Inpatient Cardiology Consultation      Reason for Consult:  Acute Elevated Troponin     Consulting Physician: Dr. Dakota King    Requesting Physician:  Dr. Krishan Villalta    Date of Consultation: 9/3/2022    HISTORY OF PRESENT ILLNESS:   Ms. Shreya Olson is a 36year old  female who is previously not known to [facility] Cardiology Physicians in [facility]. Her medical history includes obesity, continued tobacco abuse, Psoriatic Arthritis, and GERD. She was most recently hospitalized at [facility] and discharged on 07/26/2022 after she was treated for diverticulitis with an abscess. She was seen by General Surgery and ID and was placed on IV antibiotic therapy. Per review of IR, the abscess was too small to drain and therefore a PICC line was placed prior to discharge for anticipated 3 weeks of IV antibiotics and repeat CT of the abdomen in 2 weeks. Of note, CT of the abdomen also revealed hydronephrosis on the left secondary to inflammation related to the pelvic inflammation with diverticulitis. Urology was consulted and she underwent a NM Renal scan with Lasix that showed a pattern of severe but not complete obstruction of the distal left ureter after crossing the iliac vessels. Urology recommended to follow up as an outpatient. Ms. Shreya Olson presented to [facility] ED on 09/02/2022 with complaints of abdominal pain. Please note this information was obtained by the patient's current medical chart as she remains in COVID-19 isolation and is unavailable via telephone in the ED, to conserve PPE and limit exposure. According to ED documentation she presented with complaints of left lower quadrant pain that was described as sharp and intermittent, 8/10. She states over the course past 2 days she was having diarrhea. Reportedly was also having nausea and vomiting 4-5 times a day for an unknown duration with associated diaphoresis and chest pressure.   She reportedly states that the symptoms were the same type of pain that she had previously during recent admission for diverticulitis. She also reportedly finished taking her IV antibiotic therapy 1 week ago. Upon arrival to the ED her VS were oral temperature 97.9-82-/96-98% RA. EKG SB with HR 55 and low voltage in the precordial leads. Troponin of 226, 187, 182. BUN/SCR 7/0.6. WBC 6.5.  H&H 12.5/37.7. Rapid influenza screen negative. Rapid COVID testing positive. UA negative. Pregnancy urine test negative. She underwent a CT of the abdomen that revealed ongoing more late subacute phase of severe complicated diverticulitis of the mid distal sigmoid colon. Diminished number of pockets of extraluminal air which indicates well contained perforations. Significant tethering of the inflammatory process towards the root of the mesentery which also tethered is adjacent mid small bowel segments and left-sided the bladder in the fundal region of the vagina. Cannot exclude a component of a fistula between the sigmoid colon and the structures. Entrapped mid distal third of the left ureter by the inflammatory process from the sigmoid colon causing continued obstructive uropathy in the left kidney. She received Toradol 15 mg IV. General surgery was consulted. Cardiology was consulted for acute elevated troponin. Please note: past medical records were reviewed per electronic medical record (EMR) - see detailed reports under Past Medical/ Surgical History. Past Medical and Surgical History:    Obesity   Continued tobacco abuse   Psoriatic Arthritis, on Methotrexate  GERD  S/p Cholecystectomy, hysterectomy  Diverticulitis  CT of the Abdomen 07/21/2022:  Small fluid collection in the pelvic cul-de-sac measuring 3.3 x 2.6 cm compatible with an abscess. Please note there is an adjacent inflamed small bowel loop in the pelvic cul-de-sac. Persistent wall thickening and significant inflammation surrounding the rectosigmoid colon compatible with colitis or diverticulitis.  Left from the sigmoid colon causing continued obstructive uropathy in the left kidney. 4.  Visualization of the right and left ovaries they have unremarkable appearance otherwise the locating upper pelvic region. Patient had previous hysterectomy. IMPRESSION and PLAN to follow per Dr. Jamal Ganrett    Electronically signed by JEFF Lemus CNP on 9/3/2022 at 7:43 AM    ______________________________________________________________________  Cardiology attending attestation:  I have independently interviewed and examined the patient. I personally reviewed pertinent laboratory values and diagnostic testing (including, if applicable, chest xray, electrocardiogram, telemetry, echocardiogram, stress testing, and coronary angiography). I have reviewed the above documentation completed by JEFF Lemus CNP including past medical, social, and family history and agree with the findings, assessment and plan except where noted. Plan formulated by me. I participated in all aspects of the medical decision making. Please see my additional contributions to the HPI, physical exam, and assessment / medical decision making:  _______________________________________________________________________    48-year-old female not previously known to cardiology service, presenting with abdominal pain. Recently treated for diverticulitis. Reports an episode of chest pain as well that felt like \"her heart was going to fly out of her back\" like some kind of pressure. It felt better when she arched her back. She was found to have elevated troponins with no acute EKG changes which is trending down. She reports having a heart catheterization about 15 years ago and she believes she had normal coronary arteries. She was found to be positive for COVID-19 but has no respiratory symptoms and is on room air.       Physical Exam:  BP (!) 127/59   Pulse 57   Temp 98.1 °F (36.7 °C) (Oral)   Resp 18   Ht 5' 6\" (1.676 m)   Wt 195 lb 12.8 oz (88.8 kg)   SpO2 96%   BMI 31.60 kg/m²   General appearance: Awake, alert, no acute respiratory distress  Skin: Intact, no rash  ENMT: Moist mucous membranes. Poor dentition  Neck: Supple, no carotid bruits. Normal jugular venous pressure  Lungs: Clear to auscultation bilaterally. No wheezes, rales, or rhonchi  Cardiac: Regular rhythm with a low rate, normal S1&S2, no murmurs apparent. No chest wall tenderness  Abdomen: Soft, positive bowel sounds, nontender  Extremities: Moves all extremities x 4, no lower extremity edema  Neurologic: No focal motor deficits apparent, normal mood and affect    Telemetry: Sinus bradycardia 40s  EK/3/2022: Sinus bradycardia 55 bpm with some sinus arrhythmia. Normal axis and intervals. Nonspecific T wave changes. Low voltage    No evidence of coronary calcium noted on abdominal CT    Impression:   Atypical chest pain. Non anginal  Elevated hs-cTnT: [phone] ng/L. Suspect acute myocardial injury in setting of ongoing abdominal process. No definitive ACS. Remote heart catheterization per patient, no records available  COVID-19 positive  Recent severe diverticulitis with abscess with evidence of ongoing inflammation on current CT  Hydronephrosis, obstructive uropathy due to entrapped ureter from sigmoid inflammation  Psoriatic arthritis, on methotrexate  Tobacco abuse    Recommendations:  Doubt ACS. Myocarditis is a consideration but her COVID symptoms are otherwise nonexistent so seems less likely. Inflammatory markers would not be helpful given ongoing abdominal inflammatory process. Repeat EKG  Check echocardiogram  Eventual ischemic evaluation when acute issues resolve; consider stress test versus coronary CTA  Further care per primary service and consultants  Aggressive risk factor modification including smoking cessation recommended    Thank you for the consultation. Please do not hesitate to call with questions.     Meme Moncada MD, [facility]

## 2022-09-03 NOTE — ED PROVIDER NOTES
Sarah Tapia is a 36 y.o. female    No chief complaint on file. HPI   Sarah Tapia is a 36 y.o. female presenting to the ED for No chief complaint on file. History comes primarily from the patient. She is a 36year old female with a history of diverticulitis presenting for abdominal pain starting 1 day ago. Her symptoms are severe. Nothing makes the pain worse. Not eating makes the pain better. She states she was admitted to the hospital 2 weeks ago for diverticulitis and antibiotic treatment. She finished antibiotics 1 week ago with resolution of her symptoms. She reports her pain has returned in her LLQ. She states it is sharp, comes and goes, 8/10. She endorses diarrhea for 2 days, vomiting 4-5 times today, sweats, and chest pressure. She states this feels similar to her previous diverticular pain. Review of Systems   Constitutional:  Negative for chills and fever. Sweats   HENT:  Negative for ear pain and rhinorrhea. Eyes:  Negative for pain. Respiratory:  Negative for cough and shortness of breath. Cardiovascular:  Negative for chest pain and palpitations. Gastrointestinal:  Positive for abdominal pain, diarrhea and vomiting. Negative for constipation and nausea. Genitourinary:  Negative for difficulty urinating and dysuria. Musculoskeletal:  Negative for arthralgias and myalgias. Skin:  Negative for rash. Neurological:  Negative for dizziness and headaches. All other systems reviewed and are negative. Physical Exam  Constitutional:       Appearance: Normal appearance. HENT:      Head: Normocephalic and atraumatic. Nose: Nose normal.   Eyes:      Extraocular Movements: Extraocular movements intact. Pupils: Pupils are equal, round, and reactive to light. Cardiovascular:      Rate and Rhythm: Normal rate and regular rhythm. Pulmonary:      Breath sounds: Normal breath sounds. Abdominal:      General: Abdomen is flat. Palpations: Abdomen is soft. Tenderness: There is abdominal tenderness (LLQ). Musculoskeletal:         General: Normal range of motion. Cervical back: Normal range of motion. Skin:     General: Skin is warm and dry. Neurological:      General: No focal deficit present. Mental Status: She is alert and oriented to person, place, and time. Psychiatric:         Behavior: Behavior normal.        Procedures     MDM     Patient presented to the Emergency Department for No chief complaint on file. She is a 36year old female with a history of diverticulitis presenting for abdominal pain starting 1 day ago. CBC and CMP unremarkable. Troponin 226, repeat troponin 187. Not having chest pain. EKG normal. Cardiology consulted and will not anticoagulate if she has diverticulitis. UA unremarkable. CT abdomen with contrast showed ongoing late subacute phase of severe complicated diverticulitis, findings concerning for possible fistula, and trapped mid distal left ureter causing continued obstructive uropathy and left kidney. The patient's vitals remained stable. Her pain was improved with Toradol. Due to concerning CT findings as well as acute elevation of troponins patient was admitted. I discussed labs and imaging results with the patient as well as the plan for admission and she agrees with the plan. EKG: This EKG is signed and interpreted by me. Rate: 55  Rhythm: Sinus bradycardia  Axis: normal  Interpretation: no acute changes  Comparison: no previous EKG available         --------------------------------------------- PAST HISTORY ---------------------------------------------  Past Medical History:  has a past medical history of Arthritis, Diverticulosis, and Psoriasis. Past Surgical History:  has a past surgical history that includes Abdomen surgery; Cholecystectomy; lymphadenectomy (Right); Colonoscopy; Colonoscopy w/ polypectomy;  Hysterectomy; and picc insertion vascular access team (7/22/2022). Social History:  reports that she has been smoking cigarettes. She has a 26.00 pack-year smoking history. She has never used smokeless tobacco. She reports that she does not drink alcohol and does not use drugs. Family History: family history includes High Blood Pressure in her mother; Lung Cancer in her father. The patients home medications have been reviewed.     Allergies: Bee venom and Pcn [penicillins]    -------------------------------------------------- RESULTS -------------------------------------------------    LABS:  Results for orders placed or performed during the hospital encounter of 09/03/22   COVID-19, Rapid    Specimen: Nasopharyngeal Swab   Result Value Ref Range    SARS-CoV-2, NAAT DETECTED (A) Not Detected   RAPID INFLUENZA A/B ANTIGENS    Specimen: Nasopharyngeal   Result Value Ref Range    Influenza A by PCR Not Detected Not Detected    Influenza B by PCR Not Detected Not Detected   CBC with Auto Differential   Result Value Ref Range    WBC 6.5 4.5 - 11.5 E9/L    RBC 4.16 3.50 - 5.50 E12/L    Hemoglobin 12.5 11.5 - 15.5 g/dL    Hematocrit 37.7 34.0 - 48.0 %    MCV 90.6 80.0 - 99.9 fL    MCH 30.0 26.0 - 35.0 pg    MCHC 33.2 32.0 - 34.5 %    RDW 13.2 11.5 - 15.0 fL    Platelets 679 186 - 550 E9/L    MPV 12.1 (H) 7.0 - 12.0 fL    Neutrophils % 64.9 43.0 - 80.0 %    Immature Granulocytes % 0.3 0.0 - 5.0 %    Lymphocytes % 25.6 20.0 - 42.0 %    Monocytes % 8.5 2.0 - 12.0 %    Eosinophils % 0.5 0.0 - 6.0 %    Basophils % 0.2 0.0 - 2.0 %    Neutrophils Absolute 4.19 1.80 - 7.30 E9/L    Immature Granulocytes # 0.02 E9/L    Lymphocytes Absolute 1.65 1.50 - 4.00 E9/L    Monocytes Absolute 0.55 0.10 - 0.95 E9/L    Eosinophils Absolute 0.03 (L) 0.05 - 0.50 E9/L    Basophils Absolute 0.01 0.00 - 0.20 E9/L    RBC Morphology Normal    Comprehensive Metabolic Panel w/ Reflex to MG   Result Value Ref Range    Sodium 140 132 - 146 mmol/L    Potassium reflex Magnesium 4.1 3.5 - 5.0 mmol/L    Chloride 106 98 - 107 mmol/L    CO2 26 22 - 29 mmol/L    Anion Gap 8 7 - 16 mmol/L    Glucose 102 (H) 74 - 99 mg/dL    BUN 7 6 - 20 mg/dL    Creatinine 0.6 0.5 - 1.0 mg/dL    GFR Non-African American >60 >=60 mL/min/1.73    GFR African American >60     Calcium 9.2 8.6 - 10.2 mg/dL    Total Protein 6.5 6.4 - 8.3 g/dL    Albumin 3.9 3.5 - 5.2 g/dL    Total Bilirubin 0.6 0.0 - 1.2 mg/dL    Alkaline Phosphatase 72 35 - 104 U/L    ALT 17 0 - 32 U/L    AST 22 0 - 31 U/L   Lipase   Result Value Ref Range    Lipase 14 13 - 60 U/L   Lactate, Sepsis   Result Value Ref Range    Lactic Acid, Sepsis 0.9 0.5 - 1.9 mmol/L   Urinalysis with Microscopic   Result Value Ref Range    Color, UA Yellow Straw/Yellow    Clarity, UA Clear Clear    Glucose, Ur Negative Negative mg/dL    Bilirubin Urine Negative Negative    Ketones, Urine Negative Negative mg/dL    Specific Gravity, UA 1.015 1.005 - 1.030    Blood, Urine SMALL (A) Negative    pH, UA 6.5 5.0 - 9.0    Protein, UA Negative Negative mg/dL    Urobilinogen, Urine 0.2 <2.0 E.U./dL    Nitrite, Urine Negative Negative    Leukocyte Esterase, Urine Negative Negative    WBC, UA NONE 0 - 5 /HPF    RBC, UA NONE 0 - 2 /HPF    Epithelial Cells, UA FEW /HPF    Bacteria, UA NONE SEEN None Seen /HPF   Pregnancy, Urine   Result Value Ref Range    HCG(Urine) Pregnancy Test NEGATIVE NEGATIVE   Troponin   Result Value Ref Range    Troponin, High Sensitivity 226 (H) 0 - 9 ng/L   Troponin   Result Value Ref Range    Troponin, High Sensitivity 187 (H) 0 - 9 ng/L   Protime-INR   Result Value Ref Range    Protime 12.4 9.3 - 12.4 sec    INR 1.1    Troponin   Result Value Ref Range    Troponin, High Sensitivity 182 (H) 0 - 9 ng/L   Troponin   Result Value Ref Range    Troponin, High Sensitivity 149 (H) 0 - 9 ng/L   CK   Result Value Ref Range    Total CK 50 20 - 180 U/L   Lipid, Fasting   Result Value Ref Range    Cholesterol, Fasting 132 0 - 199 mg/dL    Triglyceride, Fasting 104 0 - 149 mg/dL HDL 34 >40 mg/dL    LDL Calculated 77 0 - 99 mg/dL    VLDL Cholesterol Calculated 21 mg/dL   TSH   Result Value Ref Range    TSH 0.411 0.270 - 4.200 uIU/mL   T4, Free   Result Value Ref Range    T4 Free 1.34 0.93 - 1.70 ng/dL   Hemoglobin A1C   Result Value Ref Range    Hemoglobin A1C 5.1 4.0 - 5.6 %   Magnesium   Result Value Ref Range    Magnesium 2.1 1.6 - 2.6 mg/dL   Lipid Panel   Result Value Ref Range    Cholesterol, Total 132 0 - 199 mg/dL    Triglycerides 104 0 - 149 mg/dL    HDL 34 >40 mg/dL    LDL Calculated 77 0 - 99 mg/dL    VLDL Cholesterol Calculated 21 mg/dL   EKG 12 Lead   Result Value Ref Range    Ventricular Rate 55 BPM    Atrial Rate 55 BPM    P-R Interval 162 ms    QRS Duration 90 ms    Q-T Interval 500 ms    QTc Calculation (Bazett) 478 ms    P Axis 31 degrees    R Axis 34 degrees    T Axis 40 degrees   EKG 12 Lead   Result Value Ref Range    Ventricular Rate 45 BPM    Atrial Rate 45 BPM    P-R Interval 162 ms    QRS Duration 94 ms    Q-T Interval 512 ms    QTc Calculation (Bazett) 442 ms    P Axis 30 degrees    R Axis 30 degrees    T Axis 118 degrees       RADIOLOGY:  CT ABDOMEN PELVIS W IV CONTRAST Additional Contrast? None   Final Result   1. Ongoing more late subacute phase of severe complicated diverticulitis of   the mid distal sigmoid colon as above commented. 2.  Diminished number of a pockets of extraluminal air which indicates the   well contained perforations. The there are significant tethering of the   inflammatory process towards the root of the mesentery which also tethered is   adjacent mid small bowel segments and the left-sided the bladder and the   fundal region of the vagina. Cannot exclude a component of a fistula between   the sigmoid colon and these structures. 3.  Entrapped mid distal 3rd of the left ureter by the inflammatory process   from the sigmoid colon causing continued obstructive uropathy in the left   kidney.       4.  Visualization of the right and left ovaries they have unremarkable   appearance otherwise the locating upper pelvic region. Patient had previous   hysterectomy. ------------------------- NURSING NOTES AND VITALS REVIEWED ---------------------------  Date / Time Roomed:  9/3/2022  2:47 AM  ED Bed Assignment:  [id number]    The nursing notes within the ED encounter and vital signs as below have been reviewed. Patient Vitals for the past 24 hrs:   BP Temp Temp src Pulse Resp SpO2 Height Weight   09/03/22 1445 111/62 98.6 °F (37 °C) Oral 58 18 96 % -- --   09/03/22 1015 (!) 127/59 98.1 °F (36.7 °C) Oral 57 18 96 % 5' 6\" (1.676 m) 195 lb 12.8 oz (88.8 kg)   09/03/22 0940 121/78 98 °F (36.7 °C) Oral 63 16 97 % -- --   09/03/22 0645 118/60 -- -- 68 16 96 % -- --   09/03/22 0346 (!) 125/59 -- -- 55 16 97 % -- --   09/02/22 2224 (!) 127/96 97.9 °F (36.6 °C) Oral 82 18 98 % 5' 6\" (1.676 m) 190 lb (86.2 kg)       Oxygen Saturation Interpretation: Normal    ------------------------------------------ PROGRESS NOTES ------------------------------------------  Re-evaluation(s):  Time: 800  Patients symptoms are improving  Repeat physical examination is improved    Counseling:  I have spoken with the patient and discussed todays results, in addition to providing specific details for the plan of care and counseling regarding the diagnosis and prognosis. Their questions are answered at this time and they are agreeable with the plan of admission.    --------------------------------- ADDITIONAL PROVIDER NOTES ---------------------------------  Consultations:  Time: 800. Spoke with Internal Medicine  Discussed case. They will admit the patient.   This patient's ED course included: a personal history and physicial examination, re-evaluation prior to disposition, multiple bedside re-evaluations, IV medications, cardiac monitoring, continuous pulse oximetry, and complex medical decision making and emergency management    This patient has remained hemodynamically stable during their ED course. Diagnosis:  1. Diverticulitis of colon    2. Elevated troponin    3. COVID-19        Disposition:  Patient's disposition: Admit to med/surg floor  Patient's condition is stable. Strict return precautions were discussed including but not limited too new or worsening symtpoms. They verbalized understanding and were agreeable with the plan. All questions were answered and patient was admitted.         Codi Murcia MD  Resident  09/03/22 1610

## 2022-09-03 NOTE — H&P
[facility]  [facility] and Physical      CHIEF COMPLAINT: Abdominal pain       History of Present Illness: Little Tripp  is a 36 y.o. female patient of Yissel Montemayor MD  with a pertinent PMHx of diverticulosis, Psoriasis and arthritis, presented to the ER from home c/o lower abdominal pain and vomiting. Pt was lying comfortably on bed with no acute pain. The pt states that she was diagnosed with an abdominal abscess in July and was on oral as well as IV antibiotics through PICC line since then. She has completed her antibiotic course 2 days ago. Since yesterday,she started to have abdominal pain located on LLQ and suprapubic area, progressive , rate 7/10, radiate to RLQ associated with nausea, vomiting and diarrhea. She was also having chest pain, located on the center of chest and radiate to her back . She denies fever, chills, SOB. She is an active smoker with 1 PPD. She tested +ve for Covid 19 in ER but denies any s/s. ROS:   Review of Systems   Constitutional:  Negative for chills, fatigue and fever. HENT:  Negative for congestion, rhinorrhea and sore throat. Respiratory:  Negative for cough, chest tightness and shortness of breath. Cardiovascular:  Positive for chest pain. Negative for palpitations. Gastrointestinal:  Positive for abdominal pain, diarrhea, nausea and vomiting. Negative for constipation. Genitourinary:  Negative for decreased urine volume, dysuria, flank pain and frequency. Musculoskeletal:  Positive for arthralgias. Skin:  Negative for rash and wound. Neurological:  Negative for dizziness, seizures, light-headedness and numbness. Psychiatric/Behavioral:  Negative for agitation and confusion. All other systems reviewed and are negative.       Past Medical History:   Diagnosis Date    Arthritis     Diverticulosis     Psoriasis          Past Surgical History:   Procedure Laterality Date    ABDOMEN SURGERY      CHOLECYSTECTOMY COLONOSCOPY      COLONOSCOPY W/ POLYPECTOMY      HYSTERECTOMY (CERVIX STATUS UNKNOWN)      LYMPHADENECTOMY Right     PICC INSERTION VASCULAR ACCESS TEAM  7/22/2022       Medications Prior to Admission:    Not in a hospital admission. Allergies:    Bee venom and Pcn [penicillins]    Social History:    reports that she has been smoking cigarettes. She has a 26.00 pack-year smoking history. She has never used smokeless tobacco. She reports that she does not drink alcohol and does not use drugs. Family History:   family history includes Cancer in her father; High Blood Pressure in her mother. PHYSICAL EXAM:    Vitals:  /60   Pulse 68   Temp 97.9 °F (36.6 °C) (Oral)   Resp 16   Ht 5' 6\" (1.676 m)   Wt 190 lb (86.2 kg)   SpO2 96%   BMI 30.67 kg/m²     General:  Awake, alert, oriented X 3. Well developed, well nourished, well groomed. No apparent distress. HEENT:  Normocephalic, atraumatic. No scleral icterus. No conjunctival injection. Normal lips, teeth, and gums. No nasal discharge. Neck:  Supple, no cervical adenopathy  Heart:  RRR, no murmurs, gallops, or rubs  Lungs:  CTA bilaterally, bilat symmetrical expansion, no wheeze, rales, or rhonchi  Abdomen: Bowel sounds present, soft, tender on LLQ, suprapubic area.   Extremities:  No clubbing, cyanosis, or edema  Skin:  Warm and dry, no open lesions or rash  Neuro:  Cranial nerves 2-12 intact, no focal deficits    LABS:  Recent Results (from the past 24 hour(s))   CBC with Auto Differential    Collection Time: 09/03/22  3:28 AM   Result Value Ref Range    WBC 6.5 4.5 - 11.5 E9/L    RBC 4.16 3.50 - 5.50 E12/L    Hemoglobin 12.5 11.5 - 15.5 g/dL    Hematocrit 37.7 34.0 - 48.0 %    MCV 90.6 80.0 - 99.9 fL    MCH 30.0 26.0 - 35.0 pg    MCHC 33.2 32.0 - 34.5 %    RDW 13.2 11.5 - 15.0 fL    Platelets 779 606 - 098 E9/L    MPV 12.1 (H) 7.0 - 12.0 fL    Neutrophils % 64.9 43.0 - 80.0 %    Immature Granulocytes % 0.3 0.0 - 5.0 %    Lymphocytes % 25.6 20.0 - 42.0 %    Monocytes % 8.5 2.0 - 12.0 %    Eosinophils % 0.5 0.0 - 6.0 %    Basophils % 0.2 0.0 - 2.0 %    Neutrophils Absolute 4.19 1.80 - 7.30 E9/L    Immature Granulocytes # 0.02 E9/L    Lymphocytes Absolute 1.65 1.50 - 4.00 E9/L    Monocytes Absolute 0.55 0.10 - 0.95 E9/L    Eosinophils Absolute 0.03 (L) 0.05 - 0.50 E9/L    Basophils Absolute 0.01 0.00 - 0.20 E9/L    RBC Morphology Normal    Comprehensive Metabolic Panel w/ Reflex to MG    Collection Time: 09/03/22  3:28 AM   Result Value Ref Range    Sodium 140 132 - 146 mmol/L    Potassium reflex Magnesium 4.1 3.5 - 5.0 mmol/L    Chloride 106 98 - 107 mmol/L    CO2 26 22 - 29 mmol/L    Anion Gap 8 7 - 16 mmol/L    Glucose 102 (H) 74 - 99 mg/dL    BUN 7 6 - 20 mg/dL    Creatinine 0.6 0.5 - 1.0 mg/dL    GFR Non-African American >60 >=60 mL/min/1.73    GFR African American >60     Calcium 9.2 8.6 - 10.2 mg/dL    Total Protein 6.5 6.4 - 8.3 g/dL    Albumin 3.9 3.5 - 5.2 g/dL    Total Bilirubin 0.6 0.0 - 1.2 mg/dL    Alkaline Phosphatase 72 35 - 104 U/L    ALT 17 0 - 32 U/L    AST 22 0 - 31 U/L   Lipase    Collection Time: 09/03/22  3:28 AM   Result Value Ref Range    Lipase 14 13 - 60 U/L   Lactate, Sepsis    Collection Time: 09/03/22  3:28 AM   Result Value Ref Range    Lactic Acid, Sepsis 0.9 0.5 - 1.9 mmol/L   Urinalysis with Microscopic    Collection Time: 09/03/22  3:28 AM   Result Value Ref Range    Color, UA Yellow Straw/Yellow    Clarity, UA Clear Clear    Glucose, Ur Negative Negative mg/dL    Bilirubin Urine Negative Negative    Ketones, Urine Negative Negative mg/dL    Specific Gravity, UA 1.015 1.005 - 1.030    Blood, Urine SMALL (A) Negative    pH, UA 6.5 5.0 - 9.0    Protein, UA Negative Negative mg/dL    Urobilinogen, Urine 0.2 <2.0 E.U./dL    Nitrite, Urine Negative Negative    Leukocyte Esterase, Urine Negative Negative    WBC, UA NONE 0 - 5 /HPF    RBC, UA NONE 0 - 2 /HPF    Epithelial Cells, UA FEW /HPF    Bacteria, UA NONE SEEN None Entrapped mid distal 3rd of the left ureter by the inflammatory process   from the sigmoid colon causing continued obstructive uropathy in the left   kidney. 4.  Visualization of the right and left ovaries they have unremarkable   appearance otherwise the locating upper pelvic region. Patient had previous   hysterectomy. ASSESSMENT/PLAN:      Active Hospital Problems    Diagnosis Date Noted    Diverticulitis [K57.92] 07/20/2022     Priority: Medium     1: Diverticulitis:  -Keep pt NPO  - Consult with gen surgery  -Zofran 4mg Iv  -IVF    2: Chest pain with elevated trop:  - Consult cardiology  -Telemetry monitoring  -Echo  -Continue oxygen therapy protocol     3:COVID 19 +:  -Pt tested positive for Covid 19 in ER but have no s/s.  -Fully vaccinated with booster dose.  -Observe for S/S.           CODE: Full code  Diet: NPO until advised  DVT ppx: Lovenox 40 mg SC    Case discussed with on call physician, Dr. Samir Vieira MD MD,  PGY-1  8:50 AM  9/3/2022

## 2022-09-04 PROBLEM — K57.32 DIVERTICULITIS OF COLON: Status: ACTIVE | Noted: 2022-09-04

## 2022-09-04 LAB
ALBUMIN SERPL-MCNC: 3.2 G/DL (ref 3.5–5.2)
ALP BLD-CCNC: 61 U/L (ref 35–104)
ALT SERPL-CCNC: 17 U/L (ref 0–32)
ANION GAP SERPL CALCULATED.3IONS-SCNC: 5 MMOL/L (ref 7–16)
AST SERPL-CCNC: 25 U/L (ref 0–31)
BASOPHILS ABSOLUTE: 0.02 E9/L (ref 0–0.2)
BASOPHILS RELATIVE PERCENT: 0.4 % (ref 0–2)
BILIRUB SERPL-MCNC: 0.5 MG/DL (ref 0–1.2)
BUN BLDV-MCNC: 5 MG/DL (ref 6–20)
CALCIUM SERPL-MCNC: 8.4 MG/DL (ref 8.6–10.2)
CHLORIDE BLD-SCNC: 107 MMOL/L (ref 98–107)
CO2: 25 MMOL/L (ref 22–29)
CREAT SERPL-MCNC: 0.7 MG/DL (ref 0.5–1)
EOSINOPHILS ABSOLUTE: 0.04 E9/L (ref 0.05–0.5)
EOSINOPHILS RELATIVE PERCENT: 0.7 % (ref 0–6)
GFR AFRICAN AMERICAN: >60
GFR NON-AFRICAN AMERICAN: >60 ML/MIN/1.73
GLUCOSE BLD-MCNC: 94 MG/DL (ref 74–99)
HCT VFR BLD CALC: 35.8 % (ref 34–48)
HEMOGLOBIN: 11.6 G/DL (ref 11.5–15.5)
IMMATURE GRANULOCYTES #: 0.01 E9/L
IMMATURE GRANULOCYTES %: 0.2 % (ref 0–5)
LV EF: 53 %
LVEF MODALITY: NORMAL
LYMPHOCYTES ABSOLUTE: 1.82 E9/L (ref 1.5–4)
LYMPHOCYTES RELATIVE PERCENT: 32.3 % (ref 20–42)
MCH RBC QN AUTO: 30.2 PG (ref 26–35)
MCHC RBC AUTO-ENTMCNC: 32.4 % (ref 32–34.5)
MCV RBC AUTO: 93.2 FL (ref 80–99.9)
MONOCYTES ABSOLUTE: 0.5 E9/L (ref 0.1–0.95)
MONOCYTES RELATIVE PERCENT: 8.9 % (ref 2–12)
NEUTROPHILS ABSOLUTE: 3.25 E9/L (ref 1.8–7.3)
NEUTROPHILS RELATIVE PERCENT: 57.5 % (ref 43–80)
PDW BLD-RTO: 13.3 FL (ref 11.5–15)
PLATELET # BLD: 151 E9/L (ref 130–450)
PMV BLD AUTO: 11.8 FL (ref 7–12)
POTASSIUM REFLEX MAGNESIUM: 3.6 MMOL/L (ref 3.5–5)
RBC # BLD: 3.84 E12/L (ref 3.5–5.5)
RBC # BLD: NORMAL 10*6/UL
SODIUM BLD-SCNC: 137 MMOL/L (ref 132–146)
TOTAL PROTEIN: 5.7 G/DL (ref 6.4–8.3)
TROPONIN, HIGH SENSITIVITY: 111 NG/L (ref 0–9)
WBC # BLD: 5.6 E9/L (ref 4.5–11.5)

## 2022-09-04 PROCEDURE — 99233 SBSQ HOSP IP/OBS HIGH 50: CPT | Performed by: INTERNAL MEDICINE

## 2022-09-04 PROCEDURE — 2060000000 HC ICU INTERMEDIATE R&B

## 2022-09-04 PROCEDURE — 99254 IP/OBS CNSLTJ NEW/EST MOD 60: CPT | Performed by: SURGERY

## 2022-09-04 PROCEDURE — 6370000000 HC RX 637 (ALT 250 FOR IP): Performed by: INTERNAL MEDICINE

## 2022-09-04 PROCEDURE — 6360000002 HC RX W HCPCS: Performed by: INTERNAL MEDICINE

## 2022-09-04 PROCEDURE — 87040 BLOOD CULTURE FOR BACTERIA: CPT

## 2022-09-04 PROCEDURE — 2580000003 HC RX 258: Performed by: STUDENT IN AN ORGANIZED HEALTH CARE EDUCATION/TRAINING PROGRAM

## 2022-09-04 PROCEDURE — 80053 COMPREHEN METABOLIC PANEL: CPT

## 2022-09-04 PROCEDURE — 93306 TTE W/DOPPLER COMPLETE: CPT

## 2022-09-04 PROCEDURE — 6360000002 HC RX W HCPCS: Performed by: STUDENT IN AN ORGANIZED HEALTH CARE EDUCATION/TRAINING PROGRAM

## 2022-09-04 PROCEDURE — 2580000003 HC RX 258: Performed by: INTERNAL MEDICINE

## 2022-09-04 PROCEDURE — 36415 COLL VENOUS BLD VENIPUNCTURE: CPT

## 2022-09-04 PROCEDURE — 85025 COMPLETE CBC W/AUTO DIFF WBC: CPT

## 2022-09-04 PROCEDURE — 84484 ASSAY OF TROPONIN QUANT: CPT

## 2022-09-04 PROCEDURE — 93005 ELECTROCARDIOGRAM TRACING: CPT | Performed by: INTERNAL MEDICINE

## 2022-09-04 RX ORDER — NICOTINE 21 MG/24HR
1 PATCH, TRANSDERMAL 24 HOURS TRANSDERMAL DAILY
Status: DISCONTINUED | OUTPATIENT
Start: 2022-09-04 | End: 2022-09-09 | Stop reason: HOSPADM

## 2022-09-04 RX ORDER — ATORVASTATIN CALCIUM 40 MG/1
40 TABLET, FILM COATED ORAL NIGHTLY
Status: DISCONTINUED | OUTPATIENT
Start: 2022-09-04 | End: 2022-09-08

## 2022-09-04 RX ORDER — CETIRIZINE HYDROCHLORIDE 10 MG/1
10 TABLET ORAL DAILY
Status: DISCONTINUED | OUTPATIENT
Start: 2022-09-04 | End: 2022-09-09 | Stop reason: HOSPADM

## 2022-09-04 RX ORDER — ASPIRIN 81 MG/1
81 TABLET ORAL DAILY
Status: DISCONTINUED | OUTPATIENT
Start: 2022-09-04 | End: 2022-09-08

## 2022-09-04 RX ORDER — ENOXAPARIN SODIUM 100 MG/ML
50 INJECTION SUBCUTANEOUS ONCE
Status: COMPLETED | OUTPATIENT
Start: 2022-09-04 | End: 2022-09-04

## 2022-09-04 RX ORDER — ENOXAPARIN SODIUM 100 MG/ML
1 INJECTION SUBCUTANEOUS 2 TIMES DAILY
Status: DISCONTINUED | OUTPATIENT
Start: 2022-09-04 | End: 2022-09-08

## 2022-09-04 RX ORDER — ASPIRIN 81 MG/1
243 TABLET, CHEWABLE ORAL ONCE
Status: COMPLETED | OUTPATIENT
Start: 2022-09-04 | End: 2022-09-04

## 2022-09-04 RX ORDER — ENOXAPARIN SODIUM 100 MG/ML
1 INJECTION SUBCUTANEOUS 2 TIMES DAILY
Status: DISCONTINUED | OUTPATIENT
Start: 2022-09-04 | End: 2022-09-04

## 2022-09-04 RX ORDER — PANTOPRAZOLE SODIUM 40 MG/1
40 TABLET, DELAYED RELEASE ORAL
Status: DISCONTINUED | OUTPATIENT
Start: 2022-09-05 | End: 2022-09-09 | Stop reason: HOSPADM

## 2022-09-04 RX ADMIN — DEXTROSE AND SODIUM CHLORIDE: 5; 450 INJECTION, SOLUTION INTRAVENOUS at 08:22

## 2022-09-04 RX ADMIN — CETIRIZINE HYDROCHLORIDE 10 MG: 10 TABLET, FILM COATED ORAL at 13:23

## 2022-09-04 RX ADMIN — ENOXAPARIN SODIUM 90 MG: 100 INJECTION SUBCUTANEOUS at 22:37

## 2022-09-04 RX ADMIN — MEROPENEM 2000 MG: 1 INJECTION, POWDER, FOR SOLUTION INTRAVENOUS at 13:39

## 2022-09-04 RX ADMIN — MEROPENEM 2000 MG: 1 INJECTION, POWDER, FOR SOLUTION INTRAVENOUS at 22:46

## 2022-09-04 RX ADMIN — ASPIRIN 81 MG: 81 TABLET, COATED ORAL at 13:23

## 2022-09-04 RX ADMIN — DEXTROSE AND SODIUM CHLORIDE: 5; 450 INJECTION, SOLUTION INTRAVENOUS at 22:56

## 2022-09-04 RX ADMIN — ATORVASTATIN CALCIUM 40 MG: 40 TABLET, FILM COATED ORAL at 22:37

## 2022-09-04 RX ADMIN — SODIUM CHLORIDE, PRESERVATIVE FREE 10 ML: 5 INJECTION INTRAVENOUS at 22:41

## 2022-09-04 RX ADMIN — ASPIRIN 81 MG CHEWABLE TABLET 243 MG: 81 TABLET CHEWABLE at 15:08

## 2022-09-04 RX ADMIN — ENOXAPARIN SODIUM 50 MG: 100 INJECTION SUBCUTANEOUS at 15:08

## 2022-09-04 ASSESSMENT — PAIN SCALES - GENERAL: PAINLEVEL_OUTOF10: 0

## 2022-09-04 NOTE — PROGRESS NOTES
INPATIENT CARDIOLOGY FOLLOW-UP    Name: Grey Oconnor    Age: 36 y.o. Date of Admission: 9/3/2022  2:47 AM    Date of Service: 9/4/2022    Primary Cardiologist: Known to me from this admission    Chief Complaint: Follow-up for elevated troponin    Interim History:  No further chest pain. Still with abdominal pain. Still awaiting surgery evaluation.     Review of Systems:   Negative except as described above    Problem List:  Patient Active Problem List   Diagnosis    Diverticulitis    Diverticulitis of intestine with abscess    Hydronephrosis    Psoriatic arthritis ([facility] [address]    Gastroesophageal reflux disease    Troponin I above reference range    Tobacco abuse       Current Medications:    Current Facility-Administered Medications:     sodium chloride flush 0.9 % injection 5-40 mL, 5-40 mL, IntraVENous, 2 times per day, Anabel aHmmer MD, 10 mL at 09/03/22 2139    sodium chloride flush 0.9 % injection 5-40 mL, 5-40 mL, IntraVENous, PRN, Bryn Varma MD    0.9 % sodium chloride infusion, , IntraVENous, PRN, Bryn Varma MD    enoxaparin (LOVENOX) injection 40 mg, 40 mg, SubCUTAneous, Daily, Bryn Varma MD    ondansetron (ZOFRAN-ODT) disintegrating tablet 4 mg, 4 mg, Oral, Q8H PRN **OR** ondansetron (ZOFRAN) injection 4 mg, 4 mg, IntraVENous, Q6H PRN, Bryn Varma MD    polyethylene glycol (GLYCOLAX) packet 17 g, 17 g, Oral, Daily PRN, Bryn Varma MD    acetaminophen (TYLENOL) tablet 650 mg, 650 mg, Oral, Q6H PRN **OR** acetaminophen (TYLENOL) suppository 650 mg, 650 mg, Rectal, Q6H PRN, Bryn Varma MD    dextrose 5 % and 0.45 % sodium chloride infusion, , IntraVENous, Continuous, Bryn Varma MD, Last Rate: 100 mL/hr at 09/04/22 0822, New Bag at 09/04/22 4997    perflutren lipid microspheres (DEFINITY) injection 1.65 mg, 1.5 mL, IntraVENous, ONCE PRN, Sully Husbands, APRN - CNP    Physical Exam:  /70   Pulse 63   Temp 97.7 °F (36.5 °C) (Oral)   Resp 18   Ht 5' 6\" (1.676 m)   Wt 195 lb 12.8 oz (88.8 kg)   SpO2 96%   BMI 31.60 kg/m²   Wt Readings from Last 3 Encounters:   09/03/22 195 lb 12.8 oz (88.8 kg)   08/26/22 195 lb 6.4 oz (88.6 kg)   07/26/22 209 lb 7 oz (95 kg)     Appearance: Awake, alert, no acute respiratory distress  Skin: Intact, no rash  Head: Normocephalic, atraumatic  Eyes: EOMI, no conjunctival erythema  ENMT: No pharyngeal erythema, MMM, no rhinorrhea  Neck: Supple, no elevated JVP, no carotid bruits  Lungs: Clear to auscultation bilaterally. No wheezes, rales, or rhonchi.   Cardiac: PMI nondisplaced, Regular rhythm with a normal rate, S1 & S2 normal, no murmurs  Abdomen: Soft, mild abdominal tenderness, +bowel sounds  Extremities: Moves all extremities x 4, no lower extremity edema  Neurologic: No focal motor deficits apparent, normal mood and affect  Peripheral Pulses: Intact posterior tibial pulses bilaterally    Intake/Output:    Intake/Output Summary (Last 24 hours) at 9/4/2022 1057  Last data filed at 9/4/2022 0828  Gross per 24 hour   Intake 2027 ml   Output --   Net 2027 ml     I/O this shift:  In: 2027 [I.V.:2027]  Out: -     Laboratory Tests:  Recent Labs     09/03/22 0328 09/04/22  0240    137   K 4.1 3.6    107   CO2 26 25   BUN 7 5*   CREATININE 0.6 0.7   GLUCOSE 102* 94   CALCIUM 9.2 8.4*     Lab Results   Component Value Date/Time    MG 2.1 09/03/2022 08:33 AM     Recent Labs     09/03/22 0328 09/04/22  0240   ALKPHOS 72 61   ALT 17 17   AST 22 25   PROT 6.5 5.7*   BILITOT 0.6 0.5   LABALBU 3.9 3.2*     Recent Labs     09/03/22  0328 09/04/22  0240   WBC 6.5 5.6   RBC 4.16 3.84   HGB 12.5 11.6   HCT 37.7 35.8   MCV 90.6 93.2   MCH 30.0 30.2   MCHC 33.2 32.4   RDW 13.2 13.3    151   MPV 12.1* 11.8     Lab Results   Component Value Date    CKTOTAL 50 09/03/2022     Lab Results   Component Value Date    INR 1.1 09/03/2022    INR 1.3 07/20/2022    PROTIME 12.4 09/03/2022    PROTIME 14.9 (H) 07/20/2022     Lab Results   Component Value Date    TSH 0.411 2022     Lab Results   Component Value Date    LABA1C 5.1 2022     No results found for: EAG  Lab Results   Component Value Date    CHOL 132 2022     Lab Results   Component Value Date    TRIG 104 2022     Lab Results   Component Value Date    HDL 34 2022    HDL 34 2022     Lab Results   Component Value Date    LDLCALC 77 2022    LDLCALC 77 2022     Lab Results   Component Value Date    LABVLDL 21 2022    LABVLDL 21 2022     No results found for: CHOLHDLRATIO  No results for input(s): PROBNP in the last 72 hours. Cardiac Tests:    EK/3/2022: Sinus bradycardia 55 bpm with some sinus arrhythmia. Normal axis and intervals. Nonspecific T wave changes. Low voltage    9/3/2022: Sinus bradycardia 45 beats minute. Normal axis and intervals. High lateral T wave inversions new from prior    Telemetry: Sinus rhythm 80s    Chest X-ray:     Echocardiogram: Pending    Stress test:      Cardiac catheterization:     ----------------------------------------------------------------------------------------------------------------------------------------------------------------  IMPRESSION:  Atypical chest pain. Non anginal.  No recurrence  Elevated hs-cTnT: [phone] ng/L. Possible type II MI given development of EKG changes. Remote heart catheterization per patient, no records available  COVID-19 positive, on room air  Recent severe diverticulitis with abscess with evidence of ongoing inflammation on current CT  Hydronephrosis, obstructive uropathy due to entrapped ureter from sigmoid inflammation  Psoriatic arthritis, on methotrexate  Tobacco abuse    RECOMMENDATIONS:  Myocarditis is a consideration but her COVID symptoms are otherwise nonexistent so seems less likely. Inflammatory markers would not be helpful given ongoing abdominal inflammatory process.     Repeat EKG  Echo pending  Add low-dose aspirin  Surgery input pending  If echo unremarkable and no plans for surgical intervention, could be discharged from cardiology standpoint with outpatient ischemic evaluation once acute issues resolve  If surgical intervention required we will plan for inpatient coronary CTA to exclude obstructive CAD  Aggressive risk factor modification including smoking cessation recommended    Hever Hollingsworth MD, [facility]    Addendum:  EKG showing progressive ischemic changes and echo showing septal wall motion abnormality. Treat as ACS, therapeutic enoxaparin, continue aspirin, add statin. Differential includes type I NSTEMI, type II NSTEMI, atypical Takotsubo, coronary embolism, spontaneous coronary dissection or focal myocarditis. She appears to be asymptomatic from a COVID standpoint, thus will plan for tentative left heart catheterization on Tuesday. Will need to discuss with surgery regarding ideal timing of any potential surgery but it does not appear that any acute surgical intervention is presently indicated. In the meantime she will be at high risk for any surgical procedures pending delineation of her coronary anatomy. Hever Hollingsworth MD    NOTE: This report was transcribed using voice recognition software. Every effort was made to ensure accuracy; however, inadvertent computerized transcription errors may be present.

## 2022-09-04 NOTE — CONSULTS
[address] [place] [address] [facility]    Consultation Note     Admit Date: 9/3/2022  2:47 AM    Reason for Consult: Diverticular abscess. Attending Physician:  Ese Virgen DO     Chief Complaint: Abdominal pain and vomiting    HISTORY OF PRESENT ILLNESS:   The patient is a 36 y.o.  female known to the Infectious Diseases service. The patient is known to me from prior admission. She is off antibiotics since 8/24. She came back to ER yesterday with abdominal pain, nausea vomiting diarrhea for 1 day. She had COVID last year not aware of recent infections and no one in the family has recent COVID infection. She does not have any cough shortness of breath or fever. Since admission, she is afebrile hemodynamically stable. White count is normal at 5.6 hemoglobin 11.6 platelet count is 360. Normal renal function tests LFTs are normal troponin is 111 she tested positive for COVID. CT abdomen pelvis showed  1. Ongoing more late subacute phase of severe complicated diverticulitis of   the mid distal sigmoid colon as above commented. 2.  Diminished number of a pockets of extraluminal air which indicates the   well contained perforations. The there are significant tethering of the   inflammatory process towards the root of the mesentery which also tethered is   adjacent mid small bowel segments and the left-sided the bladder and the   fundal region of the vagina. Cannot exclude a component of a fistula between   the sigmoid colon and these structures. 3.  Entrapped mid distal 3rd of the left ureter by the inflammatory process   from the sigmoid colon causing continued obstructive uropathy in the left   kidney. 4.  Visualization of the right and left ovaries they have unremarkable   appearance otherwise the locating upper pelvic region. Patient had previous   hysterectomy.      Patient is currently not on any antibiotics and I got consult for further recommendations. Past Medical History:    7/2022. Patient was admitted with abdominal pain since July 3 was seen by GI and placed on Omnicef Flagyl a week before admission but was admitted with watery diarrhea and right-sided abdominal pain. She had sigmoid diverticulitis with abscess between the rectum and the bladder. IR could not place a drain. I discharged her on IV cefepime and oral Flagyl for 4 weeks. She had repeat CT abdomen pelvis on 8/15 which showed resolved abscess. She still had persistent abnormal inflammatory changes of the sigmoid colon. IV antibiotics were stopped on 8/16 and placed on p.o. cefdinir and continued Flagyl for 1 more week. She called back on 8/24 saying more nausea vomiting so she stopped taking cefdinir.          Diagnosis Date    Arthritis     Diverticulosis     Psoriasis      Past Surgical History:        Procedure Laterality Date    ABDOMEN SURGERY      CHOLECYSTECTOMY      COLONOSCOPY      COLONOSCOPY W/ POLYPECTOMY      HYSTERECTOMY (CERVIX STATUS UNKNOWN)      LYMPHADENECTOMY Right     PICC INSERTION VASCULAR ACCESS TEAM  7/22/2022     Current Medications:   Scheduled Meds:   aspirin  81 mg Oral Daily    meropenem  2,000 mg IntraVENous Q8H    sodium chloride flush  5-40 mL IntraVENous 2 times per day    enoxaparin  40 mg SubCUTAneous Daily     Continuous Infusions:   sodium chloride      dextrose 5 % and 0.45 % NaCl 100 mL/hr at 09/04/22 0822     PRN Meds:sodium chloride flush, sodium chloride, ondansetron **OR** ondansetron, polyethylene glycol, acetaminophen **OR** acetaminophen, perflutren lipid microspheres    Allergies:  Bee venom and Pcn [penicillins]    Social History:   Social History     Socioeconomic History    Marital status:      Spouse name: None    Number of children: None    Years of education: None    Highest education level: None   Tobacco Use    Smoking status: Every Day     Packs/day: 1.00     Years: 26.00     Pack years: 26.00     Types: Cigarettes    Smokeless tobacco: Never   Vaping Use    Vaping Use: Never used   Substance and Sexual Activity    Alcohol use: Never    Drug use: Never    Sexual activity: Yes     Partners: Male       Family History:       Problem Relation Age of Onset    High Blood Pressure Mother     Lung Cancer Father    . Otherwise non-pertinent to the chief complaint. REVIEW OF SYSTEMS:    As mentioned in HPI, all other systems negative. PHYSICAL EXAM:    Vitals:    /70   Pulse 63   Temp 97.7 °F (36.5 °C) (Oral)   Resp 18   Ht 5' 6\" (1.676 m)   Wt 195 lb 12.8 oz (88.8 kg)   SpO2 96%   BMI 31.60 kg/m²   Constitutional: The patient is awake, alert, and oriented. Skin: Warm and dry. No rashes were noted. No jaundice. HEENT: Eyes show round, and reactive pupils. Moist mucous membranes, no ulcerations, no thrush. Neck: Supple to movements. No lymphadenopathy. Chest: No use of accessory muscles to breathe. Symmetrical expansion. Auscultation reveals no wheezing, crackles, or rhonchi. Cardiovascular: S1 and S2 are rhythmic and regular. No murmurs appreciated. Abdomen: Soft tenderness left lower quadrant  Extremities: No clubbing, no cyanosis, no edema.   Musculoskeletal: No gross focal abnormalities  Neurological:, Oriented x3  Lines: peripheral      CBC+dif:  Recent Labs     09/03/22  0328 09/04/22  0240   WBC 6.5 5.6   HGB 12.5 11.6   HCT 37.7 35.8   MCV 90.6 93.2    151   NEUTROABS 4.19 3.25     Lab Results   Component Value Date    CRP 0.3 08/16/2022    CRP 0.3 08/08/2022    CRP 0.7 (H) 08/01/2022     No results found for: CRPHS  Lab Results   Component Value Date    SEDRATE 1 08/16/2022    SEDRATE 5 08/08/2022    SEDRATE 19 08/01/2022     Lab Results   Component Value Date    ALT 17 09/04/2022    AST 25 09/04/2022    ALKPHOS 61 09/04/2022    BILITOT 0.5 09/04/2022     Lab Results   Component Value Date/Time     09/04/2022 02:40 AM    K 3.6 09/04/2022 02:40 AM     09/04/2022 02:40 AM    CO2 25 09/04/2022 02:40 AM    BUN 5 09/04/2022 02:40 AM    CREATININE 0.7 09/04/2022 02:40 AM    GFRAA >60 09/04/2022 02:40 AM    LABGLOM >60 09/04/2022 02:40 AM    GLUCOSE 94 09/04/2022 02:40 AM    PROT 5.7 09/04/2022 02:40 AM    LABALBU 3.2 09/04/2022 02:40 AM    CALCIUM 8.4 09/04/2022 02:40 AM    BILITOT 0.5 09/04/2022 02:40 AM    ALKPHOS 61 09/04/2022 02:40 AM    AST 25 09/04/2022 02:40 AM    ALT 17 09/04/2022 02:40 AM       Lab Results   Component Value Date/Time    PROTIME 12.4 09/03/2022 04:41 AM    INR 1.1 09/03/2022 04:41 AM       Lab Results   Component Value Date/Time    TSH 0.411 09/03/2022 08:33 AM       Lab Results   Component Value Date/Time    COLORU Yellow 09/03/2022 03:28 AM    PHUR 6.5 09/03/2022 03:28 AM    WBCUA NONE 09/03/2022 03:28 AM    RBCUA NONE 09/03/2022 03:28 AM    BACTERIA NONE SEEN 09/03/2022 03:28 AM    CLARITYU Clear 09/03/2022 03:28 AM    SPECGRAV 1.015 09/03/2022 03:28 AM    LEUKOCYTESUR Negative 09/03/2022 03:28 AM    UROBILINOGEN 0.2 09/03/2022 03:28 AM    BILIRUBINUR Negative 09/03/2022 03:28 AM    BLOODU SMALL 09/03/2022 03:28 AM    GLUCOSEU Negative 09/03/2022 03:28 AM       No results found for: DVC0HBL, BEART, M4LWTXAP, PHART, THGBART, XEO8TPS, PO2ART, BBK7WAC  Radiology:  CT ABDOMEN PELVIS W IV CONTRAST Additional Contrast? None   Final Result   1. Ongoing more late subacute phase of severe complicated diverticulitis of   the mid distal sigmoid colon as above commented. 2.  Diminished number of a pockets of extraluminal air which indicates the   well contained perforations. The there are significant tethering of the   inflammatory process towards the root of the mesentery which also tethered is   adjacent mid small bowel segments and the left-sided the bladder and the   fundal region of the vagina. Cannot exclude a component of a fistula between   the sigmoid colon and these structures.       3.  Entrapped mid distal 3rd of the left ureter by the inflammatory process   from the sigmoid colon causing continued obstructive uropathy in the left   kidney. 4.  Visualization of the right and left ovaries they have unremarkable   appearance otherwise the locating upper pelvic region. Patient had previous   hysterectomy. Microbiology:  Pending  No results for input(s): BC in the last 72 hours. No results for input(s): ORG in the last 72 hours. No results for input(s): Frances Coats in the last 72 hours. No results for input(s): STREPNEUMAGU in the last 72 hours. No results for input(s): LP1UAG in the last 72 hours. No results for input(s): ASO in the last 72 hours. No results for input(s): CULTRESP in the last 72 hours. Assessment:  Perforated sigmoid diverticulitis with intra-abdominal abscess: Abscess recurred within a week of antibiotics because of nonhealed perforation. Reviewed surgical notes outpatient surgical intervention was offered. Asymptomatic COVID-19 infection:  Psoriatic arthritis on methotrexate: Off for almost a month    Plan:    Please get 2 sets of blood cultures  Started her on IV meropenem 2 g every 8  Awaiting surgical recommendations. Will follow with you    Thank you for having us see this patient in consultation. I will be discussing this case with the treating physicians.       Electronically signed by Mckenzie Branch MD on 9/4/2022 at 11:48 AM

## 2022-09-04 NOTE — PROGRESS NOTES
[facility] CARE AT [facility]   Progress Note    Admitting Date and Time: 9/3/2022  2:47 AM  Admit Dx: Diverticulitis [K57.92]    Subjective:    Patient was admitted with Diverticulitis [K57.92]. and COVID 19 +ve. Patient Gisela Bose was lying comfortably in bed. She stated thathe abdominal pain has been improved. Denies any fever, SOB, cough. Per RN: No overnight events. ROS: denies fever, chills, cp, sob, n/v, HA unless stated above. aspirin  81 mg Oral Daily    meropenem  2,000 mg IntraVENous Q8H    sodium chloride flush  5-40 mL IntraVENous 2 times per day    enoxaparin  40 mg SubCUTAneous Daily     sodium chloride flush, 5-40 mL, PRN  sodium chloride, , PRN  ondansetron, 4 mg, Q8H PRN   Or  ondansetron, 4 mg, Q6H PRN  polyethylene glycol, 17 g, Daily PRN  acetaminophen, 650 mg, Q6H PRN   Or  acetaminophen, 650 mg, Q6H PRN  perflutren lipid microspheres, 1.5 mL, ONCE PRN         Objective:    /70   Pulse 63   Temp 97.7 °F (36.5 °C) (Oral)   Resp 18   Ht 5' 6\" (1.676 m)   Wt 195 lb 12.8 oz (88.8 kg)   SpO2 96%   BMI 31.60 kg/m²     Physical Exam  Constitutional:       General: She is not in acute distress. Appearance: Normal appearance. HENT:      Head: Normocephalic and atraumatic. Mouth/Throat:      Mouth: Mucous membranes are moist.      Pharynx: Oropharynx is clear. Eyes:      Extraocular Movements: Extraocular movements intact. Conjunctiva/sclera: Conjunctivae normal.   Cardiovascular:      Rate and Rhythm: Normal rate and regular rhythm. Pulses: Normal pulses. Heart sounds: Normal heart sounds. No murmur heard. Pulmonary:      Effort: Pulmonary effort is normal.      Breath sounds: Normal breath sounds. No wheezing. Abdominal:      General: There is no distension. Tenderness: There is abdominal tenderness (Suprapubic). Musculoskeletal:      Cervical back: Normal range of motion. Right lower leg: No edema.       Left lower leg: No edema. Skin:     General: Skin is warm and dry. Neurological:      General: No focal deficit present. Mental Status: She is alert and oriented to person, place, and time. Motor: No weakness. Psychiatric:         Attention and Perception: Attention normal.         Mood and Affect: Mood normal.       Recent Labs     09/03/22  0328 09/04/22  0240    137   K 4.1 3.6    107   CO2 26 25   BUN 7 5*   CREATININE 0.6 0.7   GLUCOSE 102* 94   CALCIUM 9.2 8.4*       Recent Labs     09/03/22 0328 09/04/22  0240   WBC 6.5 5.6   RBC 4.16 3.84   HGB 12.5 11.6   HCT 37.7 35.8   MCV 90.6 93.2   MCH 30.0 30.2   MCHC 33.2 32.4   RDW 13.2 13.3    151   MPV 12.1* 11.8            Radiology:   CT ABDOMEN PELVIS W IV CONTRAST Additional Contrast? None   Final Result   1. Ongoing more late subacute phase of severe complicated diverticulitis of   the mid distal sigmoid colon as above commented. 2.  Diminished number of a pockets of extraluminal air which indicates the   well contained perforations. The there are significant tethering of the   inflammatory process towards the root of the mesentery which also tethered is   adjacent mid small bowel segments and the left-sided the bladder and the   fundal region of the vagina. Cannot exclude a component of a fistula between   the sigmoid colon and these structures. 3.  Entrapped mid distal 3rd of the left ureter by the inflammatory process   from the sigmoid colon causing continued obstructive uropathy in the left   kidney. 4.  Visualization of the right and left ovaries they have unremarkable   appearance otherwise the locating upper pelvic region. Patient had previous   hysterectomy. Assessment:    Principal Problem:    Diverticulitis  Active Problems:    Troponin I above reference range    Tobacco abuse    Diverticulitis of colon  Resolved Problems:    * No resolved hospital problems.  *      Plan:  1: Diverticulitis:  -Keep pt NPO  - Following with gen surgery, waiting for their input.  -Zofran 4mg Iv  -IVF     2: Chest pain with elevated trop:  - Consult cardiology Dr. Karine Gamez  -Started low dose aspirin  -Echo  -Discharge will depend on GS input.    -Telemetry monitoring  -Smoking cessation     3:COVID 19 +:  -Pt tested positive for Covid 19 in ER but have no s/s.  -Fully vaccinated with booster dose.  -Observe for S/S.         Electronically signed by Marino Murray MD on 9/4/2022 at 12:08 PM

## 2022-09-04 NOTE — CONSULTS
General Surgery  Attending Consultation    Patient's Name/Date of Birth: Cary Duarte / 1980    Date: 9/2/2022    PCP/Referring Physician: Jerry Rivera MD      CHIEF COMPLAINT:  abdominal pain    HISTORY OF PRESENT ILLNESS:    Cary Duarte is an 36 y.o. female who presents with recurrent diverticulitis. She was admitted in July with perforated diverticulitis with abscess that was too small to drain and therefore was treated with IV antibiotics for 5 weeks. She presents now with abdominal pain, nausea and incidental diagnosis of COVID-19. She said the pain is actually much better today. No n/v. Tolerating clears. Having BM. Would like more food.         Past Medical History:   Past Medical History:   Diagnosis Date    Arthritis     Diverticulosis     Psoriasis         Past Surgical History:   Past Surgical History:   Procedure Laterality Date    ABDOMEN SURGERY      CHOLECYSTECTOMY      COLONOSCOPY      COLONOSCOPY W/ POLYPECTOMY      HYSTERECTOMY (CERVIX STATUS UNKNOWN)      LYMPHADENECTOMY Right     PICC INSERTION VASCULAR ACCESS TEAM  7/22/2022        Allergies: Bee venom and Pcn [penicillins]     Medications:   Current Facility-Administered Medications   Medication Dose Route Frequency Provider Last Rate Last Admin    aspirin EC tablet 81 mg  81 mg Oral Daily Juan Moreno MD        meropenem [facility]) 2,000 mg in sodium chloride 0.9 % 100 mL IVPB  2,000 mg IntraVENous Bruno Simms MD        sodium chloride flush 0.9 % injection 5-40 mL  5-40 mL IntraVENous 2 times per day Bryn Varma MD   10 mL at 09/03/22 2139    sodium chloride flush 0.9 % injection 5-40 mL  5-40 mL IntraVENous PRN Fred Rose MD        0.9 % sodium chloride infusion   IntraVENous PRN Bryn Varma MD        enoxaparin (LOVENOX) injection 40 mg  40 mg SubCUTAneous Daily Bryn Varma MD        ondansetron (ZOFRAN-ODT) disintegrating tablet 4 mg  4 mg Oral Q8H PRN Bryn Varma MD        Or    ondansetron (Lakesha Acosta) injection 4 mg  4 mg IntraVENous Q6H PRN Bryn Varma MD        polyethylene glycol (GLYCOLAX) packet 17 g  17 g Oral Daily PRN Bryn Varma MD        acetaminophen (TYLENOL) tablet 650 mg  650 mg Oral Q6H PRN Bryn Varma MD        Or    acetaminophen (TYLENOL) suppository 650 mg  650 mg Rectal Q6H PRN Bryn Varma MD        dextrose 5 % and 0.45 % sodium chloride infusion   IntraVENous Continuous Bryn Varma  mL/hr at 09/04/22 0822 New Bag at 09/04/22 8005    perflutren lipid microspheres (DEFINITY) injection 1.65 mg  1.5 mL IntraVENous ONCE PRN JEFF Mitchell - CNP             Social History:   Social History     Tobacco Use    Smoking status: Every Day     Packs/day: 1.00     Years: 26.00     Pack years: 26.00     Types: Cigarettes    Smokeless tobacco: Never   Substance Use Topics    Alcohol use: Never        Family History:   Family History   Problem Relation Age of Onset    High Blood Pressure Mother     Lung Cancer Father        Medications Prior to Admission:   Medications Prior to Admission: docusate sodium (COLACE) 100 MG capsule, Take 100 mg by mouth daily  fluconazole (DIFLUCAN) 150 MG tablet, Take 1 tablet by mouth as needed (Take one tablet as needed for yeast infection) (Patient not taking: Reported on 9/3/2022)  cetirizine (ZYRTEC) 10 MG tablet, Take 10 mg by mouth in the morning. dicyclomine (BENTYL) 10 MG capsule, Take 10 mg by mouth in the morning and 10 mg at noon and 10 mg before bedtime. (Patient not taking: Reported on 9/3/2022)  ondansetron (ZOFRAN) 4 MG tablet, Take 4 mg by mouth every 8 hours as needed for Nausea or Vomiting  celecoxib (CELEBREX) 200 MG capsule, Take 200 mg by mouth in the morning and 200 mg before bedtime.  (Patient not taking: No sig reported)  methotrexate Sodium 50 MG/2ML chemo injection, Inject 50 mg into the muscle once a week Dose is 0.6 ml (Patient not taking: No sig reported)  cyanocobalamin 1000 MCG/ML injection, Inject 1,000 mcg into the muscle every pockets of extraluminal air which indicates the   well contained perforations. The there are significant tethering of the   inflammatory process towards the root of the mesentery which also tethered is   adjacent mid small bowel segments and the left-sided the bladder and the   fundal region of the vagina. Cannot exclude a component of a fistula between   the sigmoid colon and these structures. 3.  Entrapped mid distal 3rd of the left ureter by the inflammatory process   from the sigmoid colon causing continued obstructive uropathy in the left   kidney. 4.  Visualization of the right and left ovaries they have unremarkable   appearance otherwise the locating upper pelvic region. Patient had previous   hysterectomy. ASSESSMENT AND PLAN:     Ginger Weber is an 36 y.o. female who presents with recurrent diverticulitis, COVID-19 infection    Supportive treatment of COVID  IV antibiotics  Ok to advance diet  Discussed possible surgery with the patient - timing to be determined by Dr. Yajaira Lopez.      Physician Signature: Electronically signed by Dr. Jo Ann Larios MD, FACS    Send copy of H&P to PCP, Myriam Culver MD

## 2022-09-05 PROBLEM — I24.9 ACS (ACUTE CORONARY SYNDROME) (HCC): Status: ACTIVE | Noted: 2022-09-05

## 2022-09-05 LAB — URINE CULTURE, ROUTINE: NORMAL

## 2022-09-05 PROCEDURE — 6360000002 HC RX W HCPCS: Performed by: INTERNAL MEDICINE

## 2022-09-05 PROCEDURE — 99233 SBSQ HOSP IP/OBS HIGH 50: CPT | Performed by: INTERNAL MEDICINE

## 2022-09-05 PROCEDURE — 2580000003 HC RX 258: Performed by: INTERNAL MEDICINE

## 2022-09-05 PROCEDURE — 93005 ELECTROCARDIOGRAM TRACING: CPT | Performed by: INTERNAL MEDICINE

## 2022-09-05 PROCEDURE — 2580000003 HC RX 258: Performed by: STUDENT IN AN ORGANIZED HEALTH CARE EDUCATION/TRAINING PROGRAM

## 2022-09-05 PROCEDURE — 2060000000 HC ICU INTERMEDIATE R&B

## 2022-09-05 PROCEDURE — 6370000000 HC RX 637 (ALT 250 FOR IP): Performed by: INTERNAL MEDICINE

## 2022-09-05 PROCEDURE — 99233 SBSQ HOSP IP/OBS HIGH 50: CPT | Performed by: SURGERY

## 2022-09-05 PROCEDURE — 6360000002 HC RX W HCPCS: Performed by: STUDENT IN AN ORGANIZED HEALTH CARE EDUCATION/TRAINING PROGRAM

## 2022-09-05 RX ORDER — DOCUSATE SODIUM 100 MG/1
100 CAPSULE, LIQUID FILLED ORAL DAILY
Status: DISCONTINUED | OUTPATIENT
Start: 2022-09-05 | End: 2022-09-08

## 2022-09-05 RX ADMIN — ENOXAPARIN SODIUM 90 MG: 100 INJECTION SUBCUTANEOUS at 19:34

## 2022-09-05 RX ADMIN — DEXTROSE AND SODIUM CHLORIDE: 5; 450 INJECTION, SOLUTION INTRAVENOUS at 08:42

## 2022-09-05 RX ADMIN — MEROPENEM 2000 MG: 1 INJECTION, POWDER, FOR SOLUTION INTRAVENOUS at 05:26

## 2022-09-05 RX ADMIN — MEROPENEM 2000 MG: 1 INJECTION, POWDER, FOR SOLUTION INTRAVENOUS at 21:23

## 2022-09-05 RX ADMIN — DOCUSATE SODIUM 100 MG: 100 CAPSULE, LIQUID FILLED ORAL at 11:41

## 2022-09-05 RX ADMIN — CETIRIZINE HYDROCHLORIDE 10 MG: 10 TABLET, FILM COATED ORAL at 08:41

## 2022-09-05 RX ADMIN — PANTOPRAZOLE SODIUM 40 MG: 40 TABLET, DELAYED RELEASE ORAL at 05:25

## 2022-09-05 RX ADMIN — SODIUM CHLORIDE, PRESERVATIVE FREE 10 ML: 5 INJECTION INTRAVENOUS at 08:41

## 2022-09-05 RX ADMIN — ASPIRIN 81 MG: 81 TABLET, COATED ORAL at 08:41

## 2022-09-05 RX ADMIN — MEROPENEM 2000 MG: 1 INJECTION, POWDER, FOR SOLUTION INTRAVENOUS at 11:44

## 2022-09-05 RX ADMIN — ATORVASTATIN CALCIUM 40 MG: 40 TABLET, FILM COATED ORAL at 21:26

## 2022-09-05 RX ADMIN — DEXTROSE AND SODIUM CHLORIDE: 5; 450 INJECTION, SOLUTION INTRAVENOUS at 17:49

## 2022-09-05 RX ADMIN — ENOXAPARIN SODIUM 90 MG: 100 INJECTION SUBCUTANEOUS at 08:41

## 2022-09-05 NOTE — PROGRESS NOTES
General Surgery  Attending Physician Progress Note    CC: abdominal pain    Subjective:  She said her pain is improved but still present. No n/v/d/c. Tolerating a diet. .     ROS: no SOB    I reviewed the patient's Past Medical History, Past Surgical History, Medications, and Allergies.     LABS:  CBC:   Lab Results   Component Value Date/Time    WBC 5.6 09/04/2022 02:40 AM    RBC 3.84 09/04/2022 02:40 AM    HGB 11.6 09/04/2022 02:40 AM    HCT 35.8 09/04/2022 02:40 AM    MCV 93.2 09/04/2022 02:40 AM    MCH 30.2 09/04/2022 02:40 AM    MCHC 32.4 09/04/2022 02:40 AM    RDW 13.3 09/04/2022 02:40 AM     09/04/2022 02:40 AM    MPV 11.8 09/04/2022 02:40 AM     CMP:    Lab Results   Component Value Date/Time     09/04/2022 02:40 AM    K 3.6 09/04/2022 02:40 AM     09/04/2022 02:40 AM    CO2 25 09/04/2022 02:40 AM    BUN 5 09/04/2022 02:40 AM    CREATININE 0.7 09/04/2022 02:40 AM    GFRAA >60 09/04/2022 02:40 AM    LABGLOM >60 09/04/2022 02:40 AM    GLUCOSE 94 09/04/2022 02:40 AM    PROT 5.7 09/04/2022 02:40 AM    LABALBU 3.2 09/04/2022 02:40 AM    CALCIUM 8.4 09/04/2022 02:40 AM    BILITOT 0.5 09/04/2022 02:40 AM    ALKPHOS 61 09/04/2022 02:40 AM    AST 25 09/04/2022 02:40 AM    ALT 17 09/04/2022 02:40 AM     BMP:    Lab Results   Component Value Date/Time     09/04/2022 02:40 AM    K 3.6 09/04/2022 02:40 AM     09/04/2022 02:40 AM    CO2 25 09/04/2022 02:40 AM    BUN 5 09/04/2022 02:40 AM    LABALBU 3.2 09/04/2022 02:40 AM    CREATININE 0.7 09/04/2022 02:40 AM    CALCIUM 8.4 09/04/2022 02:40 AM    GFRAA >60 09/04/2022 02:40 AM    LABGLOM >60 09/04/2022 02:40 AM     Hepatic Function Panel:    Lab Results   Component Value Date/Time    ALKPHOS 61 09/04/2022 02:40 AM    ALT 17 09/04/2022 02:40 AM    AST 25 09/04/2022 02:40 AM    PROT 5.7 09/04/2022 02:40 AM    BILITOT 0.5 09/04/2022 02:40 AM    LABALBU 3.2 09/04/2022 02:40 AM       Physical Exam:  Vitals:    09/05/22 0845   BP: 134/85   Pulse: 83

## 2022-09-05 NOTE — PROGRESS NOTES
INPATIENT CARDIOLOGY FOLLOW-UP    Name: Eliane Roberts    Age: 36 y.o. Date of Admission: 9/3/2022  2:47 AM    Date of Service: 9/5/2022    Primary Cardiologist: Known to me from this admission    Chief Complaint: Follow-up for elevated troponin    Interim History:  No further chest pain. Still with abdominal pain. Has developed progressive ischemic appearing EKG and clear septal wall motion abnormality noted on echo.     Review of Systems:   Negative except as described above    Problem List:  Patient Active Problem List   Diagnosis    Diverticulitis    Diverticulitis of intestine with abscess    Hydronephrosis    Psoriatic arthritis ([facility] [address]    Gastroesophageal reflux disease    Troponin I above reference range    Tobacco abuse    Diverticulitis of colon       Current Medications:    Current Facility-Administered Medications:     aspirin EC tablet 81 mg, 81 mg, Oral, Daily, Jhon Razo MD, 81 mg at 09/05/22 0841    meropenem (MERREM) 2,000 mg in sodium chloride 0.9 % 100 mL IVPB, 2,000 mg, IntraVENous, Q8H, Jessi Tyler MD, Stopped at 09/05/22 0828    pantoprazole (PROTONIX) tablet 40 mg, 40 mg, Oral, QAM AC, Mckinley R Lockso, DO, 40 mg at 09/05/22 0525    cetirizine (ZYRTEC) tablet 10 mg, 10 mg, Oral, Daily, Mckinley R Lockso, DO, 10 mg at 09/05/22 0841    atorvastatin (LIPITOR) tablet 40 mg, 40 mg, Oral, Nightly, Jhon Razo MD, 40 mg at 09/04/22 2237    enoxaparin (LOVENOX) injection 90 mg, 1 mg/kg, SubCUTAneous, BID, Jhon Razo MD, 90 mg at 09/05/22 0841    nicotine (NICODERM CQ) 21 MG/24HR 1 patch, 1 patch, TransDERmal, Daily, Mckinley R Lockso, DO, 1 patch at 09/05/22 0841    sodium chloride flush 0.9 % injection 5-40 mL, 5-40 mL, IntraVENous, 2 times per day, Guera Adams MD, 10 mL at 09/05/22 0841    sodium chloride flush 0.9 % injection 5-40 mL, 5-40 mL, IntraVENous, PRN, Bryn Varma MD    0.9 % sodium chloride infusion, , IntraVENous, PRN, Guera Adams MD    ondansetron (ZOFRAN-ODT) disintegrating tablet 4 mg, 4 mg, Oral, Q8H PRN **OR** ondansetron (ZOFRAN) injection 4 mg, 4 mg, IntraVENous, Q6H PRN, Bryn Varma MD    polyethylene glycol (GLYCOLAX) packet 17 g, 17 g, Oral, Daily PRN, Bryn Varma MD    acetaminophen (TYLENOL) tablet 650 mg, 650 mg, Oral, Q6H PRN **OR** acetaminophen (TYLENOL) suppository 650 mg, 650 mg, Rectal, Q6H PRN, Bryn Varma MD    dextrose 5 % and 0.45 % sodium chloride infusion, , IntraVENous, Continuous, Bryn Varma MD, Last Rate: 100 mL/hr at 09/05/22 0935, Rate Verify at 09/05/22 0935    perflutren lipid microspheres (DEFINITY) injection 1.65 mg, 1.5 mL, IntraVENous, ONCE PRN, Larry Hendrix, APRN - CNP    Physical Exam:  /85   Pulse 83   Temp 97.6 °F (36.4 °C) (Oral)   Resp 18   Ht 5' 6\" (1.676 m)   Wt 195 lb 12.8 oz (88.8 kg)   SpO2 99%   BMI 31.60 kg/m²   Wt Readings from Last 3 Encounters:   09/03/22 195 lb 12.8 oz (88.8 kg)   08/26/22 195 lb 6.4 oz (88.6 kg)   07/26/22 209 lb 7 oz (95 kg)     Appearance: Awake, alert, no acute respiratory distress  Skin: Intact, no rash  Head: Normocephalic, atraumatic  Eyes: EOMI, no conjunctival erythema  ENMT: No pharyngeal erythema, MMM, no rhinorrhea  Neck: Supple, no elevated JVP, no carotid bruits  Lungs: Clear to auscultation bilaterally. No wheezes, rales, or rhonchi.   Cardiac: PMI nondisplaced, Regular rhythm with a normal rate, S1 & S2 normal, no murmurs  Abdomen: Soft, mild abdominal tenderness, +bowel sounds  Extremities: Moves all extremities x 4, no lower extremity edema  Neurologic: No focal motor deficits apparent, normal mood and affect  Peripheral Pulses: Intact posterior tibial pulses bilaterally    Intake/Output:    Intake/Output Summary (Last 24 hours) at 9/5/2022 0937  Last data filed at 9/5/2022 0935  Gross per 24 hour   Intake 2276.46 ml   Output --   Net 2276.46 ml     I/O this shift:  In: 2276.5 [I.V.:2006.4; IV Piggyback:270.1]  Out: -     Laboratory Tests:  Recent Labs 228 22  0240    137   K 4.1 3.6    107   CO2 26 25   BUN 7 5*   CREATININE 0.6 0.7   GLUCOSE 102* 94   CALCIUM 9.2 8.4*     Lab Results   Component Value Date/Time    MG 2.1 2022 08:33 AM     Recent Labs     228 22  0240   ALKPHOS 72 61   ALT 17 17   AST 22 25   PROT 6.5 5.7*   BILITOT 0.6 0.5   LABALBU 3.9 3.2*     Recent Labs     228 22  0240   WBC 6.5 5.6   RBC 4.16 3.84   HGB 12.5 11.6   HCT 37.7 35.8   MCV 90.6 93.2   MCH 30.0 30.2   MCHC 33.2 32.4   RDW 13.2 13.3    151   MPV 12.1* 11.8     Lab Results   Component Value Date    CKTOTAL 50 2022     Lab Results   Component Value Date    INR 1.1 2022    INR 1.3 2022    PROTIME 12.4 2022    PROTIME 14.9 (H) 2022     Lab Results   Component Value Date    TSH 0.411 2022     Lab Results   Component Value Date    LABA1C 5.1 2022     No results found for: EAG  Lab Results   Component Value Date    CHOL 132 2022     Lab Results   Component Value Date    TRIG 104 2022     Lab Results   Component Value Date    HDL 34 2022    HDL 34 2022     Lab Results   Component Value Date    LDLCALC 77 2022    LDLCALC 77 2022     Lab Results   Component Value Date    LABVLDL 21 2022    LABVLDL 21 2022     No results found for: CHOLHDLRATIO  No results for input(s): PROBNP in the last 72 hours. Cardiac Tests:    EK/3/2022: Sinus bradycardia 55 bpm with some sinus arrhythmia. Normal axis and intervals. Nonspecific T wave changes. Low voltage    9/3/2022: Sinus bradycardia 45 beats minute. Normal axis and intervals. High lateral T wave inversions new from prior    Telemetry: Sinus rhythm 80s    Chest X-ray:     Echocardiogram:   TTE 22   Summary   Normal left ventricular size. LV systolic function is low normal.   Ejection fraction is visually estimated at 50-55%. Normal diastolic function.    Hypokinesis of the mid anteroseptal, mid inferoseptal, and apical septal   walls. Mild basal septal hypertrophy. Normal right ventricular size and function. No significant valvular abnormalities. Stress test:      Cardiac catheterization:     ----------------------------------------------------------------------------------------------------------------------------------------------------------------  IMPRESSION:  Atypical chest pain. Non anginal.  No recurrence  Elevated hs-cTnT: [phone] ng/L. ACS now appears likely with abnormal EKG and WMA on echo. Remote heart catheterization per patient, no records available  QT prolongation  COVID-19 positive, asymptomatic on room air  Recent severe diverticulitis with abscess with evidence of ongoing inflammation/abscess on current CT  Hydronephrosis, obstructive uropathy due to entrapped ureter from sigmoid inflammation  Psoriatic arthritis, on methotrexate  Tobacco abuse    RECOMMENDATIONS:  Complicated situation with progressive abnormal EKG and definite wall motion abnormality on echo. Possibly type II MI however can not exclude type I or other etiologies such as atypical Takotsubo, spontaneous coronary artery dissection, coronary embolism, or focal myocarditis. Urgency of surgery needs to be clarified. Ideally I would need a heart catheterization to delineate her coronary anatomy and identify etiology of the ACS. However if she were to require a coronary intervention she would need to be on DAPT that could not be interrupted for at least 3 months for a surgical procedure. If surgery could wait I would recommend left heart catheterization tomorrow. If more urgent surgery felt necessary I would obtain a coronary CTA tomorrow, however if obstructive pathology suggested she may still need a heart catheterization.     Repeat EKG today  Continue ACS treatment with therapeutic enoxaparin, aspirin and statin  I will touch base with surgical service and ID  Avoid QT prolonging agents  Aggressive risk factor modification including smoking cessation recommended    Juanito Gonzáles MD, [facility]    NOTE: This report was transcribed using voice recognition software. Every effort was made to ensure accuracy; however, inadvertent computerized transcription errors may be present.

## 2022-09-05 NOTE — PLAN OF CARE
Discussed with Dr. Tj Valiente. No immediate indication for surgery. Therefore recommend proceeding with left heart catheterization tomorrow. N.p.o. after midnight    Risks and benefits of left heart catheterization explained to patient, including risk of MI, CVA, death, bleeding complications, vascular injury, renal failure requiring dialysis, and requiring emergency surgery. Possible outcomes including need for medical management, PCI, bypass surgery explained to patient. Patient voiced understanding and agrees to proceed. AUC criteria 8/4    I updated the patient by telephone and spoke with nursing as well.     Katey Berman MD

## 2022-09-05 NOTE — PROGRESS NOTES
showed stable vitals, fairly unremarkable labs apart from a troponin of 226 unexplained by tachycardia, hypoxia, or renal dysfunction. EKG was non-ischemic. CT A/P had several abnormal findings. It showed persistent severe complicated diverticulitis. Lessening number of air pockets on CT indicate resolving perforation. Possible colo-vesical or colo-vaginal fistula was noted though it appeared difficult to interpret the imaging of this region. Entrapped portion of L ureter w \"continued\" obstructive uropathy (this was noted end of July) seen as well; pt does follow closely w urology outpatient. Also incidentally found to be COVID-positive.       Afebrile, no hypoxia  For LHC tomorrow at [facility] per cardio  No new issues noted today    Review of Systems - 12-point review of systems has been reviewed and is otherwise negative except as listed in the HPI    Objective  Physical Exam  Vitals: BP (!) 129/97   Pulse 76   Temp 98 °F (36.7 °C) (Oral)   Resp 18   Ht 5' 6\" (1.676 m)   Wt 195 lb 12.8 oz (88.8 kg)   SpO2 99%   BMI 31.60 kg/m²   General: well-developed, well-nourished, no acute distress, cooperative  Skin: generally warm, dry, and intact, with normal color  HEENT: normocephalic, atraumatic, no gross abnormalities  Respiratory: clear to auscultation bilaterally without respiratory distress  Cardiovascular: regular rate and rhythm without murmur / rub / gallop  Abdominal: soft, nontender, nondistended, normoactive bowel sounds  Extremities: no obvious edema or deformity  Neurologic: awake, alert, no gross deficits  Psychiatric: normal affect, cooperative    Electronically signed by Priyank New DO on 9/5/2022 at 8:06 AM

## 2022-09-05 NOTE — PROGRESS NOTES
Infectious Disease  Progress Note  [facility]    Chief Complaint: diverticulitis with intraabdominal abscess. Subjective:  she is feeling good. No new complaints. Eating ok. No more diarrhea. Scheduled Meds:   docusate sodium  100 mg Oral Daily    aspirin  81 mg Oral Daily    meropenem  2,000 mg IntraVENous Q8H    pantoprazole  40 mg Oral QAM AC    cetirizine  10 mg Oral Daily    atorvastatin  40 mg Oral Nightly    enoxaparin  1 mg/kg SubCUTAneous BID    nicotine  1 patch TransDERmal Daily    sodium chloride flush  5-40 mL IntraVENous 2 times per day     Continuous Infusions:   sodium chloride      dextrose 5 % and 0.45 % NaCl 100 mL/hr at 09/05/22 0935     PRN Meds:sodium chloride flush, sodium chloride, ondansetron **OR** ondansetron, polyethylene glycol, acetaminophen **OR** acetaminophen, perflutren lipid microspheres    ROS:  As mentioned in subjective, all other systems negative      /85   Pulse 83   Temp 97.6 °F (36.4 °C) (Oral)   Resp 18   Ht 5' 6\" (1.676 m)   Wt 195 lb 12.8 oz (88.8 kg)   SpO2 99%   BMI 31.60 kg/m²     Physical Exam   Constitutional: The patient is awake, alert, and oriented. Skin: Warm and dry. No rashes were noted. No jaundice. HEENT: Eyes show round, and reactive pupils. Moist mucous membranes, no ulcerations, no thrush. Neck: Supple to movements. No lymphadenopathy. Chest: No use of accessory muscles to breathe. Symmetrical expansion. Auscultation reveals no wheezing, crackles, or rhonchi. Cardiovascular: S1 and S2 are rhythmic and regular. No murmurs appreciated. Abdomen: Soft tenderness left lower quadrant  Extremities: No clubbing, no cyanosis, no edema.   Musculoskeletal: No gross focal abnormalities  Neurological:, Oriented x3  Lines: peripheral    Labs, Cultures reviewed  Radiology reviewed    Microbiology:   Blood cx 9/4: negative so far      Assessment:  Perforated sigmoid diverticulitis with intra-abdominal abscess: Abscess recurred within a week off of antibiotics because of nonhealed perforation. Reviewed surgical notes outpatient surgical intervention was offered. Asymptomatic COVID-19 infection:  Psoriatic arthritis on methotrexate: Off for almost a month  NSTEMI: reviewed cardiology note: planning on cardiac cath tomorrow at [facility]. Plan:    Continue meropenem 2 gr IV q 8   Planning on repeat long course until the sigmoid perforation heals. If she gets admitted to [facility] after cardiac cath , Please consult ID there. Will speak to Dr. Yenifer Alejandro when he is back regarding surgical intervention for diverticulitis. She will need prn fluconazole 150mg PO for vaginal candidiasis while on antibiotics. Will talk about PICC placement after cardiac cath.      Electronically signed by Heaven Reid MD on 9/5/2022 at 1:54 PM

## 2022-09-06 ENCOUNTER — HOSPITAL ENCOUNTER (OUTPATIENT)
Age: 42
Discharge: HOME | End: 2022-09-06

## 2022-09-06 PROBLEM — E43 SEVERE PROTEIN-CALORIE MALNUTRITION (HCC): Status: ACTIVE | Noted: 2022-09-06

## 2022-09-06 PROCEDURE — 99233 SBSQ HOSP IP/OBS HIGH 50: CPT | Performed by: INTERNAL MEDICINE

## 2022-09-06 PROCEDURE — 6360000002 HC RX W HCPCS: Performed by: INTERNAL MEDICINE

## 2022-09-06 PROCEDURE — 6370000000 HC RX 637 (ALT 250 FOR IP): Performed by: INTERNAL MEDICINE

## 2022-09-06 PROCEDURE — 2060000000 HC ICU INTERMEDIATE R&B

## 2022-09-06 PROCEDURE — 6360000002 HC RX W HCPCS: Performed by: STUDENT IN AN ORGANIZED HEALTH CARE EDUCATION/TRAINING PROGRAM

## 2022-09-06 PROCEDURE — 99232 SBSQ HOSP IP/OBS MODERATE 35: CPT | Performed by: SURGERY

## 2022-09-06 PROCEDURE — 2580000003 HC RX 258: Performed by: INTERNAL MEDICINE

## 2022-09-06 PROCEDURE — 2580000003 HC RX 258: Performed by: STUDENT IN AN ORGANIZED HEALTH CARE EDUCATION/TRAINING PROGRAM

## 2022-09-06 RX ORDER — FLUCONAZOLE 2 MG/ML
400 INJECTION, SOLUTION INTRAVENOUS EVERY 24 HOURS
Status: DISCONTINUED | OUTPATIENT
Start: 2022-09-06 | End: 2022-09-08

## 2022-09-06 RX ORDER — FLUCONAZOLE 2 MG/ML
150 INJECTION, SOLUTION INTRAVENOUS EVERY 24 HOURS
Status: DISCONTINUED | OUTPATIENT
Start: 2022-09-06 | End: 2022-09-06 | Stop reason: DRUGHIGH

## 2022-09-06 RX ADMIN — ASPIRIN 81 MG: 81 TABLET, COATED ORAL at 10:09

## 2022-09-06 RX ADMIN — CETIRIZINE HYDROCHLORIDE 10 MG: 10 TABLET, FILM COATED ORAL at 10:09

## 2022-09-06 RX ADMIN — PANTOPRAZOLE SODIUM 40 MG: 40 TABLET, DELAYED RELEASE ORAL at 06:49

## 2022-09-06 RX ADMIN — MEROPENEM 2000 MG: 1 INJECTION, POWDER, FOR SOLUTION INTRAVENOUS at 11:39

## 2022-09-06 RX ADMIN — DOCUSATE SODIUM 100 MG: 100 CAPSULE, LIQUID FILLED ORAL at 10:09

## 2022-09-06 RX ADMIN — MEROPENEM 2000 MG: 1 INJECTION, POWDER, FOR SOLUTION INTRAVENOUS at 03:47

## 2022-09-06 RX ADMIN — ENOXAPARIN SODIUM 90 MG: 100 INJECTION SUBCUTANEOUS at 21:41

## 2022-09-06 RX ADMIN — SODIUM CHLORIDE, PRESERVATIVE FREE 10 ML: 5 INJECTION INTRAVENOUS at 10:12

## 2022-09-06 RX ADMIN — DEXTROSE AND SODIUM CHLORIDE: 5; 450 INJECTION, SOLUTION INTRAVENOUS at 11:35

## 2022-09-06 RX ADMIN — MEROPENEM 2000 MG: 1 INJECTION, POWDER, FOR SOLUTION INTRAVENOUS at 21:43

## 2022-09-06 RX ADMIN — ATORVASTATIN CALCIUM 40 MG: 40 TABLET, FILM COATED ORAL at 21:42

## 2022-09-06 RX ADMIN — FLUCONAZOLE 400 MG: 2 INJECTION, SOLUTION INTRAVENOUS at 11:35

## 2022-09-06 ASSESSMENT — PAIN DESCRIPTION - LOCATION: LOCATION: HIP

## 2022-09-06 ASSESSMENT — PAIN SCALES - GENERAL
PAINLEVEL_OUTOF10: 0
PAINLEVEL_OUTOF10: 3
PAINLEVEL_OUTOF10: 0

## 2022-09-06 ASSESSMENT — PAIN DESCRIPTION - ORIENTATION: ORIENTATION: OTHER (COMMENT)

## 2022-09-06 ASSESSMENT — PAIN - FUNCTIONAL ASSESSMENT: PAIN_FUNCTIONAL_ASSESSMENT: ACTIVITIES ARE NOT PREVENTED

## 2022-09-06 ASSESSMENT — PAIN DESCRIPTION - DESCRIPTORS: DESCRIPTORS: ACHING

## 2022-09-06 NOTE — ACP (ADVANCE CARE PLANNING)
Advance Care Planning     Advance Care Planning Activator (Inpatient)  Conversation Note      Date of ACP Conversation: 9/6/2022     Conversation Conducted with: Rojas Jane    ACP Activator: Sixto Blakely RN      Health Care Decision Maker:     Current Designated Health Care Decision Maker:     Primary Decision Maker: Thanh [facility] - [phone]    Secondary Decision Maker: Lizzie Irene - [phone]  Click here to complete Healthcare Decision Makers including section of the Healthcare Decision Maker Relationship (ie \"Primary\")      Care Preferences    Ventilation: \"If you were in your present state of health and suddenly became very ill and were unable to breathe on your own, what would your preference be about the use of a ventilator (breathing machine) if it were available to you? \"      Would the patient desire the use of ventilator (breathing machine)?: yes    \"If your health worsens and it becomes clear that your chance of recovery is unlikely, what would your preference be about the use of a ventilator (breathing machine) if it were available to you? \"     Would the patient desire the use of ventilator (breathing machine)?: No      Resuscitation  \"CPR works best to restart the heart when there is a sudden event, like a heart attack, in someone who is otherwise healthy. Unfortunately, CPR does not typically restart the heart for people who have serious health conditions or who are very sick. \"    \"In the event your heart stopped as a result of an underlying serious health condition, would you want attempts to be made to restart your heart (answer \"yes\" for attempt to resuscitate) or would you prefer a natural death (answer \"no\" for do not attempt to resuscitate)? \" yes       [] Yes   [x] No   Educated Patient / Lawrnce Mater regarding differences between Advance Directives and portable DNR orders.     Length of ACP Conversation in minutes:  10 minutes    Conversation Outcomes:  [x] ACP discussion completed  [] Existing advance directive reviewed with patient; no changes to patient's previously recorded wishes  [] New Advance Directive completed  [] Portable Do Not Rescitate prepared for Provider review and signature  [] POLST/POST/MOLST/MOST prepared for Provider review and signature      Follow-up plan:    [] Schedule follow-up conversation to continue planning  [x] Referred individual to Provider for additional questions/concerns   [] Advised patient/agent/surrogate to review completed ACP document and update if needed with changes in condition, patient preferences or care setting    [] This note routed to one or more involved healthcare providers

## 2022-09-06 NOTE — HOME CARE
Referral received for home health possible home iv. Pricing as follows:     [facility] MEDICAID EFF DATE 7/1/2022  PAYS 100% OF MEDICAID FEE SCHEDULE NO DED/ NO OOP    Patient will need home health orders, faxed home iv script and signed pharmacy agreement faxed to [phone].      Thank you, [facility]

## 2022-09-06 NOTE — PROGRESS NOTES
INPATIENT CARDIOLOGY FOLLOW-UP    Name: Tracie Kendall    Age: 36 y.o. Date of Admission: 9/3/2022  2:47 AM    Date of Service: 9/6/2022    Primary Cardiologist: Dr. Shanika Rivas    Chief Complaint: Follow-up for elevated troponin    Interim History:  No further chest pain. Still with abdominal pain but significantly improved. Has developed progressive ischemic appearing EKG and clear septal wall motion abnormality noted on echo.     Review of Systems:   Negative except as described above    Problem List:  Patient Active Problem List   Diagnosis    Diverticulitis    Diverticulitis of intestine with abscess    Hydronephrosis    Psoriatic arthritis ([facility] [address]    Gastroesophageal reflux disease    Troponin I above reference range    Tobacco abuse    Diverticulitis of colon    ACS (acute coronary syndrome) (HCC)    Severe protein-calorie malnutrition (HCC)       Current Medications:    Current Facility-Administered Medications:     fluconazole (DIFLUCAN) in 0.9 % sodium chloride IVPB 400 mg, 400 mg, IntraVENous, Q24H, Mckinley R Lockso, DO, Last Rate: 100 mL/hr at 09/06/22 1135, 400 mg at 09/06/22 1135    docusate sodium (COLACE) capsule 100 mg, 100 mg, Oral, Daily, Mckinley R Lockso, DO, 100 mg at 09/06/22 1009    aspirin EC tablet 81 mg, 81 mg, Oral, Daily, Hermelindo Olson MD, 81 mg at 09/06/22 1009    meropenem (MERREM) 2,000 mg in sodium chloride 0.9 % 100 mL IVPB, 2,000 mg, IntraVENous, Q8H, Chayo Mclain MD, Stopped at 09/06/22 0648    pantoprazole (PROTONIX) tablet 40 mg, 40 mg, Oral, QAM AC, Mckinley R Lockso, DO, 40 mg at 09/06/22 0649    cetirizine (ZYRTEC) tablet 10 mg, 10 mg, Oral, Daily, Mckinley R Lockso, DO, 10 mg at 09/06/22 1009    atorvastatin (LIPITOR) tablet 40 mg, 40 mg, Oral, Nightly, Hermelindo Olson MD, 40 mg at 09/05/22 2126    enoxaparin (LOVENOX) injection 90 mg, 1 mg/kg, SubCUTAneous, BID, Hermelindo Olson MD, 90 mg at 09/05/22 1934    nicotine (NICODERM CQ) 21 MG/24HR 1 patch, 1 patch, TransDERmal, Daily, Mckinley Calhoun DO, 1 patch at 09/06/22 1012    sodium chloride flush 0.9 % injection 5-40 mL, 5-40 mL, IntraVENous, 2 times per day, Chuy Riddle MD, 10 mL at 09/06/22 1012    sodium chloride flush 0.9 % injection 5-40 mL, 5-40 mL, IntraVENous, PRN, Bryn Varma MD    0.9 % sodium chloride infusion, , IntraVENous, PRN, Bryn Varma MD    ondansetron (ZOFRAN-ODT) disintegrating tablet 4 mg, 4 mg, Oral, Q8H PRN **OR** ondansetron (ZOFRAN) injection 4 mg, 4 mg, IntraVENous, Q6H PRN, Bryn Varma MD    polyethylene glycol (GLYCOLAX) packet 17 g, 17 g, Oral, Daily PRN, Bryn Varma MD    acetaminophen (TYLENOL) tablet 650 mg, 650 mg, Oral, Q6H PRN **OR** acetaminophen (TYLENOL) suppository 650 mg, 650 mg, Rectal, Q6H PRN, Bryn Varma MD    dextrose 5 % and 0.45 % sodium chloride infusion, , IntraVENous, Continuous, Bryn Varma MD, Last Rate: 100 mL/hr at 09/06/22 1135, New Bag at 09/06/22 1135    perflutren lipid microspheres (DEFINITY) injection 1.65 mg, 1.5 mL, IntraVENous, ONCE PRN, Idelle Model, APRN - CNP    Physical Exam:  /81   Pulse 63   Temp 97.9 °F (36.6 °C) (Oral)   Resp 16   Ht 5' 6\" (1.676 m)   Wt 211 lb 3.2 oz (95.8 kg)   SpO2 98%   BMI 34.09 kg/m²   Wt Readings from Last 3 Encounters:   09/06/22 211 lb 3.2 oz (95.8 kg)   08/26/22 195 lb 6.4 oz (88.6 kg)   07/26/22 209 lb 7 oz (95 kg)     Appearance: Awake, alert, no acute respiratory distress  Skin: Intact, no rash  Head: Normocephalic, atraumatic  Eyes: EOMI, no conjunctival erythema  ENMT: No pharyngeal erythema, MMM, no rhinorrhea  Neck: Supple, no elevated JVP, no carotid bruits  Lungs: Clear to auscultation bilaterally. No wheezes, rales, or rhonchi.   Cardiac: PMI nondisplaced, Regular rhythm with a normal rate, S1 & S2 normal, no murmurs  Abdomen: Soft, mild abdominal tenderness, +bowel sounds  Extremities: Moves all extremities x 4, no lower extremity edema  Neurologic: No focal motor deficits apparent, normal mood and affect  Peripheral Pulses: Intact posterior tibial pulses bilaterally    Intake/Output:    Intake/Output Summary (Last 24 hours) at 2022 1146  Last data filed at 2022 1827  Gross per 24 hour   Intake 1087.14 ml   Output --   Net 1087.14 ml     No intake/output data recorded. Laboratory Tests:  Recent Labs     22  0240      K 3.6      CO2 25   BUN 5*   CREATININE 0.7   GLUCOSE 94   CALCIUM 8.4*     Lab Results   Component Value Date/Time    MG 2.1 2022 08:33 AM     Recent Labs     22  0240   ALKPHOS 61   ALT 17   AST 25   PROT 5.7*   BILITOT 0.5   LABALBU 3.2*     Recent Labs     22  0240   WBC 5.6   RBC 3.84   HGB 11.6   HCT 35.8   MCV 93.2   MCH 30.2   MCHC 32.4   RDW 13.3      MPV 11.8     Lab Results   Component Value Date    CKTOTAL 50 2022     Lab Results   Component Value Date    INR 1.1 2022    INR 1.3 2022    PROTIME 12.4 2022    PROTIME 14.9 (H) 2022     Lab Results   Component Value Date    TSH 0.411 2022     Lab Results   Component Value Date    LABA1C 5.1 2022     No results found for: EAG  Lab Results   Component Value Date    CHOL 132 2022     Lab Results   Component Value Date    TRIG 104 2022     Lab Results   Component Value Date    HDL 34 2022    HDL 34 2022     Lab Results   Component Value Date    LDLCALC 77 2022    LDLCALC 77 2022     Lab Results   Component Value Date    LABVLDL 21 2022    LABVLDL 21 2022     No results found for: CHOLHDLRATIO  No results for input(s): PROBNP in the last 72 hours. Cardiac Tests:    EK/3/2022: Sinus bradycardia 55 bpm with some sinus arrhythmia. Normal axis and intervals. Nonspecific T wave changes. Low voltage    9/3/2022: Sinus bradycardia 45 beats minute. Normal axis and intervals.   High lateral T wave inversions new from prior    Telemetry: Currently off the monitor    Labs and vitals were reviewed: Blood pressure 122/81 heart rate 60 sats 100% on room air    Labs-as above, no labs for today. Chest X-ray:     Echocardiogram:   TTE 9/4/22   Summary   Normal left ventricular size. LV systolic function is low normal.   Ejection fraction is visually estimated at 50-55%. Normal diastolic function. Hypokinesis of the mid anteroseptal, mid inferoseptal, and apical septal   walls. Mild basal septal hypertrophy. Normal right ventricular size and function. No significant valvular abnormalities. Stress test:      Cardiac catheterization:     ----------------------------------------------------------------------------------------------------------------------------------------------------------------  IMPRESSION:  Atypical chest pain. Non anginal.  No recurrence  Elevated hs-cTnT: [id number] ng/L. ACS now appears likely with abnormal EKG and WMA on echo. Remote heart catheterization per patient, no records available  QT prolongation  COVID-19 positive, asymptomatic on room air  Recent severe diverticulitis with abscess with evidence of ongoing inflammation/abscess on current CT, surgical exploration down the road  Hydronephrosis, obstructive uropathy due to entrapped ureter from sigmoid inflammation  Psoriatic arthritis, on methotrexate  Tobacco abuse    RECOMMENDATIONS:  Echo results were reviewed, as above  Keep her n.p.o. and cardiac cath as scheduled later today, discussed with the patient and she is agreeable to proceed. AUC score 9 indication 3.   Continue ACS treatment with therapeutic enoxaparin, aspirin and statin  Avoid QT prolonging agents  Aggressive risk factor modification including smoking cessation recommended  Patient was strongly encouraged to quit tobacco use  Further recommendation pending above    Yogi Rain MD, [address] [facility]

## 2022-09-06 NOTE — PROGRESS NOTES
Abscess recurred within a week off of antibiotics because of nonhealed perforation. Asymptomatic COVID-19 infection:  Psoriatic arthritis on methotrexate: Off for almost a month  NSTEMI: cath today. Plan:    Continue meropenem 2 gr IV q 8   Discussed with Dr. Cody Hernandez: surgical plan will depend on what cardiac cath shows. Even if cath was clean plan is to give a trial of IV antibiotics to calm the colon and get infection under control before surgical procedure. Will place PICC when she is back from cath. Will follow with you.      Electronically signed by Edwin Ortiz MD on 9/6/2022 at 4:02 PM

## 2022-09-06 NOTE — CARE COORDINATION
COVID + 9/3. Phone conversation w/ patient. Explained role of case manger and plan of care. Lives w/ her , daughter, son, and grandson in a mobile home- 5 steps to entrance. Does not drive- daughter provides needed transportation. Has nebulizer. Hx [facility]. No Hx [facility]. PCP is Dr. Oumou Kay and pharmacy is [address] [place] [address]. Currently on iv abx- Awaiting ID final plan- for possible PICC per ID note- no HHC preference- referral made to [facility]- will need HHC orders on discharge- will need sign abx and pharmacy agreement faxed to [phone]. For cardiac cath today. Will follow Ravindra King RNcase manager     The Plan for Transition of Care is related to the following treatment goals: possible iv abx administration    The Patient and/or patient representative Sonny Bowen was provided with a choice of provider and agrees   with the discharge plan. [x] Yes [] No    Freedom of choice list was provided with basic dialogue that supports the patient's individualized plan of care/goals, treatment preferences and shares the quality data associated with the providers.  [x] Yes [] No

## 2022-09-06 NOTE — PROGRESS NOTES
[facility] CARE AT [facility] Hospital[facility]   Progress Note    Admitting Date and Time: 9/3/2022  2:47 AM  Admit Dx: Diverticulitis [K57.92]    Subjective:    Patient was admitted with Diverticulitis [K57.92]. Patient Tracie Kendall feels good today. She denies SOB, cough,fever. He abdominal pain has been improved. She was able to sleep at night without any difficulty. She stated that she is having vaginal discharge, white-curdy, associated with itch. She always have such type of discharge when she is antibiotics. She has been scheduled for Cath lab today and waiting  for that. Besides that no active complaints. Per RN: No overnight events    ROS: denies fever, chills, cp, sob, n/v, HA unless stated above. fluconazole  400 mg IntraVENous Q24H    docusate sodium  100 mg Oral Daily    aspirin  81 mg Oral Daily    meropenem  2,000 mg IntraVENous Q8H    pantoprazole  40 mg Oral QAM AC    cetirizine  10 mg Oral Daily    atorvastatin  40 mg Oral Nightly    enoxaparin  1 mg/kg SubCUTAneous BID    nicotine  1 patch TransDERmal Daily    sodium chloride flush  5-40 mL IntraVENous 2 times per day     sodium chloride flush, 5-40 mL, PRN  sodium chloride, , PRN  ondansetron, 4 mg, Q8H PRN   Or  ondansetron, 4 mg, Q6H PRN  polyethylene glycol, 17 g, Daily PRN  acetaminophen, 650 mg, Q6H PRN   Or  acetaminophen, 650 mg, Q6H PRN  perflutren lipid microspheres, 1.5 mL, ONCE PRN         Objective:    /81   Pulse 63   Temp 97.9 °F (36.6 °C) (Oral)   Resp 16   Ht 5' 6\" (1.676 m)   Wt 211 lb 3.2 oz (95.8 kg)   SpO2 98%   BMI 34.09 kg/m²     Physical Exam  Constitutional:       General: She is not in acute distress. Appearance: Normal appearance. HENT:      Head: Normocephalic and atraumatic. Mouth/Throat:      Mouth: Mucous membranes are moist.      Pharynx: Oropharynx is clear. Eyes:      Extraocular Movements: Extraocular movements intact.       Conjunctiva/sclera: Conjunctivae normal. Cardiovascular:      Rate and Rhythm: Normal rate and regular rhythm. Pulses: Normal pulses. Heart sounds: Normal heart sounds. No murmur heard. Pulmonary:      Effort: Pulmonary effort is normal.      Breath sounds: Normal breath sounds. No wheezing. Abdominal:      General: There is no distension. Tenderness: There is no abdominal tenderness. Musculoskeletal:      Cervical back: Normal range of motion. Right lower leg: No edema. Left lower leg: No edema. Skin:     General: Skin is warm and dry. Neurological:      General: No focal deficit present. Mental Status: She is alert and oriented to person, place, and time. Psychiatric:         Attention and Perception: Attention normal.         Mood and Affect: Mood normal.       Recent Labs     09/04/22  0240      K 3.6      CO2 25   BUN 5*   CREATININE 0.7   GLUCOSE 94   CALCIUM 8.4*       Recent Labs     09/04/22 0240   WBC 5.6   RBC 3.84   HGB 11.6   HCT 35.8   MCV 93.2   MCH 30.2   MCHC 32.4   RDW 13.3      MPV 11.8       CMP:    Lab Results   Component Value Date/Time     09/04/2022 02:40 AM    K 3.6 09/04/2022 02:40 AM     09/04/2022 02:40 AM    CO2 25 09/04/2022 02:40 AM    BUN 5 09/04/2022 02:40 AM    CREATININE 0.7 09/04/2022 02:40 AM    GFRAA >60 09/04/2022 02:40 AM    LABGLOM >60 09/04/2022 02:40 AM    GLUCOSE 94 09/04/2022 02:40 AM    PROT 5.7 09/04/2022 02:40 AM    LABALBU 3.2 09/04/2022 02:40 AM    CALCIUM 8.4 09/04/2022 02:40 AM    BILITOT 0.5 09/04/2022 02:40 AM    ALKPHOS 61 09/04/2022 02:40 AM    AST 25 09/04/2022 02:40 AM    ALT 17 09/04/2022 02:40 AM        Radiology:   CT ABDOMEN PELVIS W IV CONTRAST Additional Contrast? None   Final Result   1. Ongoing more late subacute phase of severe complicated diverticulitis of   the mid distal sigmoid colon as above commented. 2.  Diminished number of a pockets of extraluminal air which indicates the   well contained perforations. The there are significant tethering of the   inflammatory process towards the root of the mesentery which also tethered is   adjacent mid small bowel segments and the left-sided the bladder and the   fundal region of the vagina. Cannot exclude a component of a fistula between   the sigmoid colon and these structures. 3.  Entrapped mid distal 3rd of the left ureter by the inflammatory process   from the sigmoid colon causing continued obstructive uropathy in the left   kidney. 4.  Visualization of the right and left ovaries they have unremarkable   appearance otherwise the locating upper pelvic region. Patient had previous   hysterectomy. Assessment:    Principal Problem:    Diverticulitis  Active Problems:    Troponin I above reference range    Tobacco abuse    Diverticulitis of colon    ACS (acute coronary syndrome) (HCC)    Severe protein-calorie malnutrition (HCC)  Resolved Problems:    * No resolved hospital problems. *      Plan:  1: Diverticulitis:  - Following with gen surgery, Will probably do sigmoid resection but after cardiac cath and cardiology recommendation  -Merem 2gm Iv per ID recommendation. Will need PICC line for long term Rx but after cardiac cath. -IVF     2: Chest pain with elevated trop:  -Scheduled for cardiac cath today. -Treated per ACS protocol    3:COVID 19 +:  -Pt tested positive for Covid 19 in ER but have no s/s.  -Fully vaccinated with booster dose.  -Observe for S/S.     4: Vaginal Candidiasis:  -Fluconazole 150 OD per ID recommendation.      Electronically signed by Tg Mcgowan MD on 9/6/2022 at 11:24 AM

## 2022-09-06 NOTE — PROGRESS NOTES
Comprehensive Nutrition Assessment    Type and Reason for Visit:  Initial, Positive Nutrition Screen    Nutrition Recommendations/Plan:   ADAT when medically feasible  Continue inpatient monitoring     Malnutrition Assessment:  Malnutrition Status:  Severe malnutrition (09/06/22 0820)    Context:  Acute Illness     Findings of the 6 clinical characteristics of malnutrition:  Energy Intake:  50% or less of estimated energy requirements for 5 or more days  Weight Loss:  5% over 1 month (7% in 6 wks)     Body Fat Loss:  Unable to assess (Covid Isolation)     Muscle Mass Loss:  Unable to assess (Covid Isolation)    Fluid Accumulation:  No significant fluid accumulation     Strength:  Not Performed    Nutrition Assessment:    Pt admit w/recurrent diverticulitis. Pt also currently Covid +. She is currently at [facility] (1-) for cardiac cath 9/6 2/2 progressive ischemic appearing EKG abnormalities. Pt will need sigmoid resection but cardiac issues to be addressed first. Note significant wt loss recently, likely d/t impaired GI function with N/V/D w/diverticulitis. Will continue to monitor pt status    Nutrition Related Findings:    A&Ox4, N/V/D PTA resolving, dentures, +BS, +I/O 5.6L, no edema Wound Type: None       Current Nutrition Intake & Therapies:    Average Meal Intake: NPO  Average Supplements Intake: NPO  Diet NPO Exceptions are: Sips of Water with Meds    Anthropometric Measures:  Height: 5' 6\" (167.6 cm)  Ideal Body Weight (IBW): 130 lbs (59 kg)    Admission Body Weight: 195 lb 12.8 oz (88.8 kg) (9/3 - 7% wt loss from UBW x 6 wks ago)  Current Body Weight: 211 lb 3.2 oz (95.8 kg), 162.5 % IBW.  Weight Source: Bed Scale (9/6)  Current BMI (kg/m2): 34.1  Usual Body Weight: 209 lb 7 oz (95 kg) (wt during July admit (~6 wks))  % Weight Change (Calculated): 0.8                    BMI Categories: Obese Class 1 (BMI 30.0-34.9) (31.5 at admit wt)    Estimated Daily Nutrient Needs:  Energy Requirements Based On: Formula ([facility])  Weight Used for Energy Requirements: Admission  Energy (kcal/day):   Weight Used for Protein Requirements: Ideal  Protein (g/day): 75-85  Method Used for Fluid Requirements: 1 ml/kcal  Fluid (ml/day):     Nutrition Diagnosis:   Severe malnutrition, In context of acute illness or injury related to altered GI function as evidenced by Criteria as identified in malnutrition assessment    Nutrition Interventions:   Food and/or Nutrient Delivery: Continue NPO (ADAT when medically feasible)  Nutrition Education/Counseling: No recommendation at this time  Coordination of Nutrition Care: Continue to monitor while inpatient       Goals:     Goals: other (specify)  Specify Other Goals: Nutrition progression    Nutrition Monitoring and Evaluation:   Behavioral-Environmental Outcomes: None Identified  Food/Nutrient Intake Outcomes: Diet Advancement/Tolerance  Physical Signs/Symptoms Outcomes: GI Status, Nausea or Vomiting, Diarrhea, Biochemical Data, Fluid Status or Edema, Nutrition Focused Physical Findings, Skin, Weight    Discharge Planning:     Too soon to determine     Anival Schafer RD, [address] , [place]  Contact: [phone]

## 2022-09-06 NOTE — PROGRESS NOTES
GENERAL SURGERY  DAILY PROGRESS NOTE  9/6/2022    CHIEF COMPLAINT:  Recurrent diverticulitis    SUBJECTIVE:  Patient with some nausea and chills. Vomiting while having BM. OBJECTIVE:  /81   Pulse 60   Temp 97.6 °F (36.4 °C) (Oral)   Resp 16   Ht 5' 6\" (1.676 m)   Wt 195 lb 12.8 oz (88.8 kg)   SpO2 100%   BMI 31.60 kg/m²     GENERAL:  NAD. A&Ox3. LUNGS:  No increased work of breathing. CARDIOVASCULAR: RR  ABDOMEN:  Soft, non-distended, minimal LLQ tenderness. No guarding, rigidity, rebound. EXT: warm and well perfused     ASSESSMENT/PLAN:  36 y.o. female who presents with recurrent diverticulitis. - appreciate cardiology assistance, needs cardiac cath today  - will ultimately need sigmoid resection but must be medically optimized first  - prn pain control    Rodolfo Estes MD  Surgery Resident PGY-3  9/6/2022  4:59 AM      Attending Physician Statement:    Chief Complaint: No chief complaint on file. I have examined the patient and performed the key aspects of physical exam, reviewed the record (including all pertinent and new radiology images and laboratory findings), and discussed the case with the surgical team.  I agree with the assessment and plan with the following additions, corrections, and changes. 14pt review of symptoms completed and negative except as mentioned. Feeling much better currently, minimal LLQ pain currently. For cath today, pending results will determine further surgical plans and timing. Cont Abx, okay for diet and bowel reg post cath. Will touch base with ID. Jovon Harry MD  09/06/22  12:05 PM    NOTE: This report, in part or full, may have been transcribed using voice recognition software. Every effort was made to ensure accuracy; however, inadvertent computerized transcription errors may be present. Please excuse any transcriptional grammatical or spelling errors that may have escaped my editorial review.

## 2022-09-06 NOTE — PLAN OF CARE
Problem: Discharge Planning  Goal: Discharge to home or other facility with appropriate resources  9/5/2022 2213 by Arlen Valencia RN  Outcome: Progressing  9/5/2022 1333 by Goldie Leslie RN  Outcome: Progressing     Problem: Safety - Adult  Goal: Free from fall injury  9/5/2022 1333 by Goldie Leslie RN  Outcome: Progressing

## 2022-09-07 ENCOUNTER — HOSPITAL ENCOUNTER (OUTPATIENT)
Dept: CARDIAC CATH/INVASIVE PROCEDURES | Age: 42
Discharge: HOME | End: 2022-09-07
Payer: MEDICAID

## 2022-09-07 VITALS
OXYGEN SATURATION: 97 % | SYSTOLIC BLOOD PRESSURE: 127 MMHG | HEART RATE: 66 BPM | DIASTOLIC BLOOD PRESSURE: 88 MMHG | RESPIRATION RATE: 18 BRPM

## 2022-09-07 LAB
EKG ATRIAL RATE: 45 BPM
EKG ATRIAL RATE: 56 BPM
EKG ATRIAL RATE: 64 BPM
EKG P AXIS: 28 DEGREES
EKG P AXIS: 30 DEGREES
EKG P AXIS: 33 DEGREES
EKG P-R INTERVAL: 152 MS
EKG P-R INTERVAL: 156 MS
EKG P-R INTERVAL: 162 MS
EKG Q-T INTERVAL: 492 MS
EKG Q-T INTERVAL: 512 MS
EKG Q-T INTERVAL: 542 MS
EKG QRS DURATION: 86 MS
EKG QRS DURATION: 86 MS
EKG QRS DURATION: 94 MS
EKG QTC CALCULATION (BAZETT): 442 MS
EKG QTC CALCULATION (BAZETT): 507 MS
EKG QTC CALCULATION (BAZETT): 523 MS
EKG R AXIS: 23 DEGREES
EKG R AXIS: 30 DEGREES
EKG R AXIS: 8 DEGREES
EKG T AXIS: 118 DEGREES
EKG T AXIS: 138 DEGREES
EKG T AXIS: 139 DEGREES
EKG VENTRICULAR RATE: 45 BPM
EKG VENTRICULAR RATE: 56 BPM
EKG VENTRICULAR RATE: 64 BPM
TOTAL CK: 20 U/L (ref 20–180)

## 2022-09-07 PROCEDURE — 93458 L HRT ARTERY/VENTRICLE ANGIO: CPT | Performed by: INTERNAL MEDICINE

## 2022-09-07 PROCEDURE — 6370000000 HC RX 637 (ALT 250 FOR IP): Performed by: INTERNAL MEDICINE

## 2022-09-07 PROCEDURE — 2060000000 HC ICU INTERMEDIATE R&B

## 2022-09-07 PROCEDURE — 36415 COLL VENOUS BLD VENIPUNCTURE: CPT

## 2022-09-07 PROCEDURE — C1894 INTRO/SHEATH, NON-LASER: HCPCS

## 2022-09-07 PROCEDURE — C1769 GUIDE WIRE: HCPCS

## 2022-09-07 PROCEDURE — 99232 SBSQ HOSP IP/OBS MODERATE 35: CPT | Performed by: INTERNAL MEDICINE

## 2022-09-07 PROCEDURE — 2709999900 HC NON-CHARGEABLE SUPPLY

## 2022-09-07 PROCEDURE — 2500000003 HC RX 250 WO HCPCS

## 2022-09-07 PROCEDURE — 99232 SBSQ HOSP IP/OBS MODERATE 35: CPT | Performed by: SURGERY

## 2022-09-07 PROCEDURE — 82550 ASSAY OF CK (CPK): CPT

## 2022-09-07 PROCEDURE — 93458 L HRT ARTERY/VENTRICLE ANGIO: CPT

## 2022-09-07 PROCEDURE — 2580000003 HC RX 258

## 2022-09-07 PROCEDURE — 2580000003 HC RX 258: Performed by: INTERNAL MEDICINE

## 2022-09-07 PROCEDURE — C1887 CATHETER, GUIDING: HCPCS

## 2022-09-07 PROCEDURE — 6360000002 HC RX W HCPCS

## 2022-09-07 PROCEDURE — 6360000002 HC RX W HCPCS: Performed by: STUDENT IN AN ORGANIZED HEALTH CARE EDUCATION/TRAINING PROGRAM

## 2022-09-07 PROCEDURE — 2580000003 HC RX 258: Performed by: STUDENT IN AN ORGANIZED HEALTH CARE EDUCATION/TRAINING PROGRAM

## 2022-09-07 PROCEDURE — 6360000002 HC RX W HCPCS: Performed by: INTERNAL MEDICINE

## 2022-09-07 RX ORDER — SODIUM CHLORIDE 9 MG/ML
INJECTION, SOLUTION INTRAVENOUS PRN
Status: DISCONTINUED | OUTPATIENT
Start: 2022-09-07 | End: 2022-09-09 | Stop reason: HOSPADM

## 2022-09-07 RX ORDER — SODIUM CHLORIDE 9 MG/ML
INJECTION, SOLUTION INTRAVENOUS PRN
OUTPATIENT
Start: 2022-09-07

## 2022-09-07 RX ORDER — ACETAMINOPHEN 325 MG/1
650 TABLET ORAL EVERY 4 HOURS PRN
OUTPATIENT
Start: 2022-09-07

## 2022-09-07 RX ORDER — SODIUM CHLORIDE 0.9 % (FLUSH) 0.9 %
5-40 SYRINGE (ML) INJECTION PRN
Status: DISCONTINUED | OUTPATIENT
Start: 2022-09-07 | End: 2022-09-09 | Stop reason: HOSPADM

## 2022-09-07 RX ORDER — SODIUM CHLORIDE 0.9 % (FLUSH) 0.9 %
5-40 SYRINGE (ML) INJECTION EVERY 12 HOURS SCHEDULED
Status: DISCONTINUED | OUTPATIENT
Start: 2022-09-07 | End: 2022-09-09 | Stop reason: HOSPADM

## 2022-09-07 RX ORDER — SODIUM CHLORIDE 9 MG/ML
INJECTION, SOLUTION INTRAVENOUS CONTINUOUS
Status: DISCONTINUED | OUTPATIENT
Start: 2022-09-07 | End: 2022-09-08 | Stop reason: HOSPADM

## 2022-09-07 RX ORDER — HEPARIN SODIUM (PORCINE) LOCK FLUSH IV SOLN 100 UNIT/ML 100 UNIT/ML
3 SOLUTION INTRAVENOUS PRN
Status: DISCONTINUED | OUTPATIENT
Start: 2022-09-07 | End: 2022-09-09 | Stop reason: HOSPADM

## 2022-09-07 RX ORDER — SODIUM CHLORIDE 0.9 % (FLUSH) 0.9 %
5-40 SYRINGE (ML) INJECTION PRN
OUTPATIENT
Start: 2022-09-07

## 2022-09-07 RX ORDER — HEPARIN SODIUM (PORCINE) LOCK FLUSH IV SOLN 100 UNIT/ML 100 UNIT/ML
3 SOLUTION INTRAVENOUS EVERY 12 HOURS SCHEDULED
Status: DISCONTINUED | OUTPATIENT
Start: 2022-09-07 | End: 2022-09-09 | Stop reason: HOSPADM

## 2022-09-07 RX ORDER — SODIUM CHLORIDE 0.9 % (FLUSH) 0.9 %
5-40 SYRINGE (ML) INJECTION EVERY 12 HOURS SCHEDULED
OUTPATIENT
Start: 2022-09-07

## 2022-09-07 RX ORDER — LIDOCAINE HYDROCHLORIDE 10 MG/ML
5 INJECTION, SOLUTION EPIDURAL; INFILTRATION; INTRACAUDAL; PERINEURAL ONCE
Status: COMPLETED | OUTPATIENT
Start: 2022-09-07 | End: 2022-09-08

## 2022-09-07 RX ADMIN — MEROPENEM 2000 MG: 1 INJECTION, POWDER, FOR SOLUTION INTRAVENOUS at 03:59

## 2022-09-07 RX ADMIN — DOCUSATE SODIUM 100 MG: 100 CAPSULE, LIQUID FILLED ORAL at 10:02

## 2022-09-07 RX ADMIN — SODIUM CHLORIDE, PRESERVATIVE FREE 10 ML: 5 INJECTION INTRAVENOUS at 10:02

## 2022-09-07 RX ADMIN — MEROPENEM 2000 MG: 1 INJECTION, POWDER, FOR SOLUTION INTRAVENOUS at 22:10

## 2022-09-07 RX ADMIN — ASPIRIN 81 MG: 81 TABLET, COATED ORAL at 10:02

## 2022-09-07 RX ADMIN — MEROPENEM 2000 MG: 1 INJECTION, POWDER, FOR SOLUTION INTRAVENOUS at 12:49

## 2022-09-07 RX ADMIN — Medication 10 ML: at 22:15

## 2022-09-07 RX ADMIN — FLUCONAZOLE 400 MG: 2 INJECTION, SOLUTION INTRAVENOUS at 10:42

## 2022-09-07 RX ADMIN — CETIRIZINE HYDROCHLORIDE 10 MG: 10 TABLET, FILM COATED ORAL at 10:02

## 2022-09-07 RX ADMIN — ATORVASTATIN CALCIUM 40 MG: 40 TABLET, FILM COATED ORAL at 22:15

## 2022-09-07 ASSESSMENT — PAIN SCALES - GENERAL
PAINLEVEL_OUTOF10: 0
PAINLEVEL_OUTOF10: 0

## 2022-09-07 NOTE — PROCEDURES
[address]                  [address] [id number],  [facility]                            CARDIAC CATHETERIZATION    PATIENT NAME: Shar Wayne                 :        1980  MED REC NO:   [id number]                            ROOM:  ACCOUNT NO:   [de-identified]                           ADMIT DATE: 2022  PROVIDER:     Tiffany El MD    DATE OF PROCEDURE:  2022    PROCEDURE:  Left heart catheterization. INDICATION:  Non-ST-elevation myocardial infarction and wall motion  abnormalities on echocardiogram.    REFERRING PROVIDER:  Dr. Camarena Pulse NOTE:  The patient was transferred from [facility] this  afternoon to undergo left heart catheterization. She was brought to the  cardiac cath lab in usual fasting state. After obtaining a signed  consent from the patient and under sterile condition and under local  anesthetic, a 6-[place] Terumo Slender sheath was inserted into her right  radial artery. She received 5000 units of intravenous heparin. She  also received 200 mcg of intraarterial nitroglycerin and 2.5 mg of  diluted verapamil via the right radial sheath. Then, a 5-[place] TIG  diagnostic catheter was advanced over a J-tip wire to the ascending  aorta without difficulty. The right coronary artery was engaged and a  coronary angiogram was done. Then, the left main coronary artery was  engaged and a coronary angiogram was done. This catheter was exchanged  over a guidewire to a 5-[place] pigtail which was advanced into the left  ventricle without difficulty. The left ventricular end-diastolic  pressure was measured. Left ventriculogram was done. There was no  significant gradient across the aortic valve. At the end of the  procedure, the pigtail was pulled out. The Terumo Slender sheath was  pulled out and a Vasc Band was applied over the right radial artery with  good hemostasis.   The patient tolerated the procedure very well and no  complications were noted. No significant blood loss occurred during the  procedure. FINDINGS:  HEMODYNAMICS:  Aortic pressure 134/86 mmHg. Left ventricular end-diastolic pressure 23 mmHg. CORONARY ANATOMY:  LEFT MAIN:  It is a short artery with no angiographic stenosis noted. LAD:  It is a large artery wrapping around the apex and giving rise to  two small diagonal branches and two fair-size septal perforators. No  angiographic stenosis noted. RAMUS BRANCH:  It is large with no angiographic stenosis noted. LEFT CIRCUMFLEX:  It is a large artery giving rise to a large obtuse  marginal branch and probably an SA tirso and AV tirso branch. No  angiographic stenosis noted. RIGHT CORONARY ARTERY:  It is a large dominant artery giving rise to two  very small right ventricular marginal branches, a small PDA and a  fair-sized PLV branch. No angiographic stenosis noted. Left ventriculogram revealed mid ventricular hypokinesis consistent with  atypical Takotsubo cardiomyopathy with an ejection fraction of 45  plus/minus 5%. No mitral inflation was noted. IMPRESSION:  1. Absence of epicardial coronary artery stenosis. 2.  Elevated LVEDP at 23 mmHg. 3.  Mid ventricular hypokinesis consistent with atypical Takotsubo  cardiomyopathy with an ejection fraction of 45 plus/minus 5%. RECOMMENDATIONS:  Guideline-directed medical therapy. PROCEDURE START TIME:  8498. PROCEDURE END TIME:  1634. FLUOROSCOPY TIME:  1.3 minutes. CONTRAST VOLUME:  50 mL of Isovue. CONSCIOUS SEDATION:  1 mg of intravenous Versed and 50 mcg of  intravenous fentanyl.         Azucena Pahram MD    D: 09/07/2022 16:49:38       T: 09/07/2022 16:51:59     [id number]  Job#: [id number]     Doc#: [id number]    CC:

## 2022-09-07 NOTE — CARE COORDINATION
Cath rescheduled for today d/t cath lab scheduling issues yesterday. Continues on iv abx-await final abx [facility] following- will need HHC orders on discharge- will need sign abx and pharmacy agreement faxed to [phone]. Will need PICC. Surgery following- await plan after cath completed.  Will follow Idalia Bae RNcase manager

## 2022-09-07 NOTE — PROGRESS NOTES
Infectious Disease  Progress Note  [facility]    Chief Complaint: diverticulitis with intraabdominal abscess. Subjective:    Feeling well sitting up in chair  Waiting for heart cath today   No new complaints   Afebrile     Scheduled Meds:   fluconazole  400 mg IntraVENous Q24H    docusate sodium  100 mg Oral Daily    aspirin  81 mg Oral Daily    meropenem  2,000 mg IntraVENous Q8H    pantoprazole  40 mg Oral QAM AC    cetirizine  10 mg Oral Daily    atorvastatin  40 mg Oral Nightly    enoxaparin  1 mg/kg SubCUTAneous BID    nicotine  1 patch TransDERmal Daily    sodium chloride flush  5-40 mL IntraVENous 2 times per day     Continuous Infusions:   sodium chloride      dextrose 5 % and 0.45 % NaCl 100 mL/hr at 09/06/22 1135     PRN Meds:sodium chloride flush, sodium chloride, ondansetron **OR** ondansetron, polyethylene glycol, acetaminophen **OR** acetaminophen, perflutren lipid microspheres    ROS:  As mentioned in subjective, all other systems negative      /86   Pulse 60   Temp 98.4 °F (36.9 °C) (Oral)   Resp 18   Ht 5' 6\" (1.676 m)   Wt 211 lb 3.2 oz (95.8 kg)   SpO2 98%   BMI 34.09 kg/m²     Physical Exam   Constitutional: The patient is awake, alert, and oriented. Sitting up in chair. In no distress. Skin: Warm and dry. No rashes were noted. No jaundice. HEENT: Eyes show round, and reactive pupils. Moist mucous membranes, no ulcerations, no thrush. Neck: Supple to movements. No lymphadenopathy. Chest: No use of accessory muscles to breathe. Symmetrical expansion. Auscultation reveals no wheezing, crackles, or rhonchi. Cardiovascular: S1 and S2 are rhythmic and regular. No murmurs appreciated. Abdomen: Soft tenderness left lower quadrant  Extremities: No clubbing, no cyanosis, no edema.   Musculoskeletal: No gross focal abnormalities  Neurological:, Oriented x3  Lines: peripheral    Labs, Cultures reviewed  Radiology reviewed    Microbiology:   Blood cx 9/4: negative so far      Assessment:  Perforated sigmoid diverticulitis with intra-abdominal abscess: Abscess recurred within a week off of antibiotics because of nonhealed perforation. Asymptomatic COVID-19 infection:  Psoriatic arthritis on methotrexate: Off for almost a month  NSTEMI: cath 9/7/22. Plan:    Continue Meropenem 2 gr IV q 8   Surgery following   PICC line ordered   Will follow with you. Electronically signed by JEFF Light CNP on 9/7/2022 at 11:30 AM     Pt seen and examined. Above discussed agree with advanced practice nurse. Labs, cultures, and radiographs reviewed. Face to Face encounter occurred. Changes made as necessary.      Shahzad Silverman MD

## 2022-09-07 NOTE — PROGRESS NOTES
Pt came to [facility] for recovery for tx back to [facility]. Bad was removed and board applied. Site wnl. +2 pulse. Vs stable entire time.

## 2022-09-07 NOTE — PROGRESS NOTES
INPATIENT CARDIOLOGY FOLLOW-UP    Name: Priya Mills    Age: 36 y.o. Date of Admission: 9/3/2022  2:47 AM    Date of Service: 9/7/2022    Primary Cardiologist: Dr. Ba Webb    Chief Complaint: Follow-up for elevated troponin    Interim History:  She told me that she had a mild chest discomfort this morning mainly localized to the upper sternal area that lasted about 5 minutes and resolved spontaneously currently no chest pain. She did not go for cardiac cath yesterday which was canceled due to her COVID status and also increased patient load. She denies any fever, cough, chills and no dyspnea. She is scheduled for cath today and again at the end of the day. Still with abdominal pain but significantly improved. Has developed progressive ischemic appearing EKG and clear septal wall motion abnormality noted on echo.     Review of Systems:   Negative except as described above    Problem List:  Patient Active Problem List   Diagnosis    Diverticulitis    Diverticulitis of intestine with abscess    Hydronephrosis    Psoriatic arthritis ([facility] [address]    Gastroesophageal reflux disease    Troponin I above reference range    Tobacco abuse    Diverticulitis of colon    ACS (acute coronary syndrome) (HCC)    Severe protein-calorie malnutrition (HCC)       Current Medications:    Current Facility-Administered Medications:     fluconazole (DIFLUCAN) in 0.9 % sodium chloride IVPB 400 mg, 400 mg, IntraVENous, Q24H, Mckinley Calhoun DO, Last Rate: 100 mL/hr at 09/06/22 1135, 400 mg at 09/06/22 1135    docusate sodium (COLACE) capsule 100 mg, 100 mg, Oral, Daily, Mckinley Calhoun DO, 100 mg at 09/06/22 1009    aspirin EC tablet 81 mg, 81 mg, Oral, Daily, Kia Bruce MD, 81 mg at 09/06/22 1009    meropenem (MERREM) 2,000 mg in sodium chloride 0.9 % 100 mL IVPB, 2,000 mg, IntraVENous, Q8H, Lian Moser MD, Last Rate: 33.3 mL/hr at 09/07/22 0359, 2,000 mg at 09/07/22 0359    pantoprazole (PROTONIX) tablet 40 mg, 40 mg, Oral, QAM AC, Mckinley R Lockso, DO, 40 mg at 09/06/22 0649    cetirizine (ZYRTEC) tablet 10 mg, 10 mg, Oral, Daily, Mckinley R Lockso, DO, 10 mg at 09/06/22 1009    atorvastatin (LIPITOR) tablet 40 mg, 40 mg, Oral, Nightly, Jorge Mccormick MD, 40 mg at 09/06/22 2142    enoxaparin (LOVENOX) injection 90 mg, 1 mg/kg, SubCUTAneous, BID, Jorge Mccormick MD, 90 mg at 09/06/22 2141    nicotine (NICODERM CQ) 21 MG/24HR 1 patch, 1 patch, TransDERmal, Daily, Mckinley R Lockso, DO, 1 patch at 09/06/22 1012    sodium chloride flush 0.9 % injection 5-40 mL, 5-40 mL, IntraVENous, 2 times per day, Juan Manuel Batista MD, 10 mL at 09/06/22 1012    sodium chloride flush 0.9 % injection 5-40 mL, 5-40 mL, IntraVENous, PRN, Bryn Varma MD    0.9 % sodium chloride infusion, , IntraVENous, PRN, Bryn Varma MD    ondansetron (ZOFRAN-ODT) disintegrating tablet 4 mg, 4 mg, Oral, Q8H PRN **OR** ondansetron (ZOFRAN) injection 4 mg, 4 mg, IntraVENous, Q6H PRN, Bryn Varma MD    polyethylene glycol (GLYCOLAX) packet 17 g, 17 g, Oral, Daily PRN, Bryn Varma MD    acetaminophen (TYLENOL) tablet 650 mg, 650 mg, Oral, Q6H PRN **OR** acetaminophen (TYLENOL) suppository 650 mg, 650 mg, Rectal, Q6H PRN, Bryn Varma MD    dextrose 5 % and 0.45 % sodium chloride infusion, , IntraVENous, Continuous, Bryn Varma MD, Last Rate: 100 mL/hr at 09/06/22 1135, New Bag at 09/06/22 1135    perflutren lipid microspheres (DEFINITY) injection 1.65 mg, 1.5 mL, IntraVENous, ONCE PRN, Tammy Augustine, APRN - CNP    Physical Exam:  /63   Pulse 68   Temp 98.8 °F (37.1 °C) (Oral)   Resp 18   Ht 5' 6\" (1.676 m)   Wt 211 lb 3.2 oz (95.8 kg)   SpO2 98%   BMI 34.09 kg/m²   Wt Readings from Last 3 Encounters:   09/06/22 211 lb 3.2 oz (95.8 kg)   08/26/22 195 lb 6.4 oz (88.6 kg)   07/26/22 209 lb 7 oz (95 kg)     Appearance: Awake, alert, no acute respiratory distress  Skin: Intact, no rash  Head: Normocephalic, atraumatic  Eyes: EOMI, no conjunctival erythema  ENMT: No pharyngeal erythema, MMM, no rhinorrhea  Neck: Supple, no elevated JVP, no carotid bruits  Lungs: Clear to auscultation bilaterally. No wheezes, rales, or rhonchi. Cardiac: PMI nondisplaced, Regular rhythm with a normal rate, S1 & S2 normal, no murmurs  Abdomen: Soft, mild abdominal tenderness, +bowel sounds  Extremities: Moves all extremities x 4, no lower extremity edema  Neurologic: No focal motor deficits apparent, normal mood and affect  Peripheral Pulses: Intact posterior tibial pulses bilaterally    Intake/Output:    Intake/Output Summary (Last 24 hours) at 9/7/2022 0902  Last data filed at 9/6/2022 1851  Gross per 24 hour   Intake 2102.33 ml   Output --   Net 2102.33 ml     No intake/output data recorded. Laboratory Tests:  No results for input(s): NA, K, CL, CO2, BUN, CREATININE, GLUCOSE, CALCIUM in the last 72 hours. Lab Results   Component Value Date/Time    MG 2.1 09/03/2022 08:33 AM     No results for input(s): ALKPHOS, ALT, AST, PROT, BILITOT, BILIDIR, LABALBU in the last 72 hours. No results for input(s): WBC, RBC, HGB, HCT, MCV, MCH, MCHC, RDW, PLT, MPV in the last 72 hours.     Lab Results   Component Value Date    CKTOTAL 20 09/07/2022     Lab Results   Component Value Date    INR 1.1 09/03/2022    INR 1.3 07/20/2022    PROTIME 12.4 09/03/2022    PROTIME 14.9 (H) 07/20/2022     Lab Results   Component Value Date    TSH 0.411 09/03/2022     Lab Results   Component Value Date    LABA1C 5.1 09/03/2022     No results found for: EAG  Lab Results   Component Value Date    CHOL 132 09/03/2022     Lab Results   Component Value Date    TRIG 104 09/03/2022     Lab Results   Component Value Date    HDL 34 09/03/2022    HDL 34 09/03/2022     Lab Results   Component Value Date    LDLCALC 77 09/03/2022    LDLCALC 77 09/03/2022     Lab Results   Component Value Date    LABVLDL 21 09/03/2022    LABVLDL 21 09/03/2022     No results found for: CHOLHDLRATIO  No results for input(s): PROBNP in the last 72 hours. Cardiac Tests:    EK/3/2022: Sinus bradycardia 55 bpm with some sinus arrhythmia. Normal axis and intervals. Nonspecific T wave changes. Low voltage    9/3/2022: Sinus bradycardia 45 beats minute. Normal axis and intervals. High lateral T wave inversions new from prior    Telemetry: Currently off the monitor    Labs and vitals were reviewed: Blood pressure 122/81 heart rate 60 sats 100% on room air    Labs-as above, no labs for today. Chest X-ray:     Echocardiogram:   TTE 22   Summary   Normal left ventricular size. LV systolic function is low normal.   Ejection fraction is visually estimated at 50-55%. Normal diastolic function. Hypokinesis of the mid anteroseptal, mid inferoseptal, and apical septal   walls. Mild basal septal hypertrophy. Normal right ventricular size and function. No significant valvular abnormalities. Stress test:      Cardiac catheterization:     ----------------------------------------------------------------------------------------------------------------------------------------------------------------  IMPRESSION:  Atypical chest pain. Had 1 episode of chest pain this morning. Elevated hs-cTnT: [id number] ng/L. ACS now appears likely with abnormal EKG and WMA on echo. Remote heart catheterization per patient, no records available  QT prolongation  COVID-19 positive, asymptomatic on room air  Recent severe diverticulitis with abscess with evidence of ongoing inflammation/abscess on current CT, surgical exploration down the road  Hydronephrosis, obstructive uropathy due to entrapped ureter from sigmoid inflammation  Psoriatic arthritis, on methotrexate  Tobacco abuse  Hemodynamically stable, current blood pressure 107/63 heart rate 68    RECOMMENDATIONS:  Echo results were reviewed, as above  Keep her n.p.o. and cardiac cath as scheduled later today,   AUC score 9 indication 3.   Continue ACS treatment with therapeutic enoxaparin, aspirin and statin  Avoid QT prolonging agents  Aggressive risk factor modification including smoking cessation recommended  Patient was strongly encouraged to quit tobacco use  Further recommendation pending above    Jaziel Miranda MD, [facility] Cardiology

## 2022-09-07 NOTE — PROGRESS NOTES
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 / colovaginal fistula  Left-sided obstructive uropathy  Elevated troponin, suspect ACS  COVID without hypoxia  Hx arthritis, psoriasis, GERD, obesity    Plan  Cardio input appreciated - treat as ACS w therapeutic Lovenox / ASA / statin; plans for L heart cath today 9/6 at [facility] ([facility]  ID input appreciated - meropenem, check blood cx; notes pt will need a prolonged course of abx until perforation heals, will prob need more long-term vascular access  Surgical input appreciated - abx, advance diet, will need sigmoid resection after cardiac stabilization  COVID is asymptomatic and does not warrant treatment  Low fiber diet / IVF / analgesia prn  Please see orders for further plan of care  Please see orders for further plan of care    Code status  Full  DVT prophylaxis Lovenox  Disposition  To be determined  --------------------------------------------------------------------------------------    Subjective  History of Present Illness  42F PMH arthritis, psoriasis, GERD, obesity, and diverticulosis as well as recent finding of diverticular abscess treated with cefepime and Flagyl then Omnicef and Flagyl on dc. Seen by ID 8/24 outpatient and pt c/o persistent pain and fevers. Saw by her surgeon on 8/26 noted she had stopped the SZYMANSKI EDD but finished the Flagyl and was feeling much better. Possibility of resection was discussed given that this was her second episode and was also particularly severe. She began to have pain again on 9/1, mostly in LLQ, worse w eating. Also having nausea, emesis, diarrhea, fevers, sweats and reports these symptoms are similar to her last episode of diverticulitis.   Workup showed stable vitals, fairly unremarkable labs apart from a troponin of 226 unexplained by tachycardia, hypoxia, or renal dysfunction. EKG was non-ischemic. CT A/P had several abnormal findings. It showed persistent severe complicated diverticulitis. Lessening number of air pockets on CT indicate resolving perforation. Possible colo-vesical or colo-vaginal fistula was noted though it appeared difficult to interpret the imaging of this region. Entrapped portion of L ureter w \"continued\" obstructive uropathy (this was noted end of July) seen as well; pt does follow closely w urology outpatient. Also incidentally found to be COVID-positive. The elevated troponin prompted cardiology consultation, who did have concern for ACS given the development of progressive ischemic changes on EKG. Pt started on therapeutic Lovenox, statin, ASA was continued, and pt set up for L heart cath at [facility] (1-) today 9/6. 42F PMH arthritis, psoriasis, GERD, obesity, and diverticulosis as well as recent finding of diverticular abscess treated with cefepime and Flagyl then Omnicef and Flagyl on dc. Seen by ID 8/24 outpatient and pt c/o persistent pain and fevers. Saw by her surgeon on 8/26 noted she had stopped the SZYMANSKI EDD but finished the Flagyl and was feeling much better. Possibility of resection was discussed given that this was her second episode and was also particularly severe. She began to have pain again on 9/1, mostly in LLQ, worse w eating. Also having nausea, emesis, diarrhea, fevers, sweats and reports these symptoms are similar to her last episode of diverticulitis. Workup showed stable vitals, fairly unremarkable labs apart from a troponin of 226 unexplained by tachycardia, hypoxia, or renal dysfunction. EKG was non-ischemic. CT A/P had several abnormal findings. It showed persistent severe complicated diverticulitis. Lessening number of air pockets on CT indicate resolving perforation.   Possible colo-vesical or colo-vaginal fistula was noted though it appeared difficult to interpret the imaging of this region. Entrapped portion of L ureter w \"continued\" obstructive uropathy (this was noted end of July) seen as well; pt does follow closely w urology outpatient. Also incidentally found to be COVID-positive. The elevated troponin prompted cardiology consultation, who did have concern for ACS given the development of progressive ischemic changes on EKG. Pt started on therapeutic Lovenox, statin, ASA was continued, and pt set up for L heart cath at [facility] (1-) today 9/6. Apparently due to pt's COVID status she can only go essentially when they're ready for her and she can't sit in a hallway. She was therefore unable to get cath on 9/6 and so far today hasn't gotten it yet either.      Review of Systems - 12-point review of systems has been reviewed and is otherwise negative except as listed in the HPI    Objective  Physical Exam  Vitals: /88   Pulse 67   Temp 98.6 °F (37 °C) (Oral)   Resp 18   Ht 5' 6\" (1.676 m)   Wt 211 lb 3.2 oz (95.8 kg)   SpO2 98%   BMI 34.09 kg/m²   General: well-developed, well-nourished, no acute distress, cooperative  Skin: generally warm, dry, and intact, with normal color  HEENT: normocephalic, atraumatic, no gross abnormalities  Respiratory: clear to auscultation bilaterally without respiratory distress  Cardiovascular: regular rate and rhythm without murmur / rub / gallop  Abdominal: soft, nontender, nondistended, normoactive bowel sounds  Extremities: no obvious edema or deformity  Neurologic: awake, alert, no gross deficits  Psychiatric: normal affect, cooperative    Electronically signed by Marita Olvera DO on 9/7/2022 at 5:01 PM

## 2022-09-08 LAB
ANION GAP SERPL CALCULATED.3IONS-SCNC: 8 MMOL/L (ref 7–16)
BUN BLDV-MCNC: 4 MG/DL (ref 6–20)
CALCIUM SERPL-MCNC: 8.6 MG/DL (ref 8.6–10.2)
CHLORIDE BLD-SCNC: 106 MMOL/L (ref 98–107)
CO2: 24 MMOL/L (ref 22–29)
CREAT SERPL-MCNC: 0.6 MG/DL (ref 0.5–1)
GFR AFRICAN AMERICAN: >60
GFR NON-AFRICAN AMERICAN: >60 ML/MIN/1.73
GLUCOSE BLD-MCNC: 99 MG/DL (ref 74–99)
HCT VFR BLD CALC: 34.6 % (ref 34–48)
HEMOGLOBIN: 11.3 G/DL (ref 11.5–15.5)
MAGNESIUM: 1.9 MG/DL (ref 1.6–2.6)
MCH RBC QN AUTO: 30.6 PG (ref 26–35)
MCHC RBC AUTO-ENTMCNC: 32.7 % (ref 32–34.5)
MCV RBC AUTO: 93.8 FL (ref 80–99.9)
PDW BLD-RTO: 13.3 FL (ref 11.5–15)
PHOSPHORUS: 2.5 MG/DL (ref 2.5–4.5)
PLATELET # BLD: 134 E9/L (ref 130–450)
PMV BLD AUTO: 12 FL (ref 7–12)
POTASSIUM SERPL-SCNC: 3.7 MMOL/L (ref 3.5–5)
RBC # BLD: 3.69 E12/L (ref 3.5–5.5)
SODIUM BLD-SCNC: 138 MMOL/L (ref 132–146)
TOTAL CK: 18 U/L (ref 20–180)
WBC # BLD: 5.1 E9/L (ref 4.5–11.5)

## 2022-09-08 PROCEDURE — 6370000000 HC RX 637 (ALT 250 FOR IP): Performed by: INTERNAL MEDICINE

## 2022-09-08 PROCEDURE — 2500000003 HC RX 250 WO HCPCS

## 2022-09-08 PROCEDURE — 02HV33Z INSERTION OF INFUSION DEVICE INTO SUPERIOR VENA CAVA, PERCUTANEOUS APPROACH: ICD-10-PCS

## 2022-09-08 PROCEDURE — 6370000000 HC RX 637 (ALT 250 FOR IP): Performed by: STUDENT IN AN ORGANIZED HEALTH CARE EDUCATION/TRAINING PROGRAM

## 2022-09-08 PROCEDURE — 6360000002 HC RX W HCPCS: Performed by: INTERNAL MEDICINE

## 2022-09-08 PROCEDURE — 83735 ASSAY OF MAGNESIUM: CPT

## 2022-09-08 PROCEDURE — 36592 COLLECT BLOOD FROM PICC: CPT

## 2022-09-08 PROCEDURE — 2580000003 HC RX 258: Performed by: INTERNAL MEDICINE

## 2022-09-08 PROCEDURE — 85027 COMPLETE CBC AUTOMATED: CPT

## 2022-09-08 PROCEDURE — 84100 ASSAY OF PHOSPHORUS: CPT

## 2022-09-08 PROCEDURE — 99233 SBSQ HOSP IP/OBS HIGH 50: CPT | Performed by: INTERNAL MEDICINE

## 2022-09-08 PROCEDURE — 76937 US GUIDE VASCULAR ACCESS: CPT

## 2022-09-08 PROCEDURE — 6360000002 HC RX W HCPCS

## 2022-09-08 PROCEDURE — 82550 ASSAY OF CK (CPK): CPT

## 2022-09-08 PROCEDURE — 99232 SBSQ HOSP IP/OBS MODERATE 35: CPT | Performed by: SURGERY

## 2022-09-08 PROCEDURE — 80048 BASIC METABOLIC PNL TOTAL CA: CPT

## 2022-09-08 PROCEDURE — 2580000003 HC RX 258

## 2022-09-08 PROCEDURE — 6360000002 HC RX W HCPCS: Performed by: STUDENT IN AN ORGANIZED HEALTH CARE EDUCATION/TRAINING PROGRAM

## 2022-09-08 PROCEDURE — 36569 INSJ PICC 5 YR+ W/O IMAGING: CPT

## 2022-09-08 PROCEDURE — 2060000000 HC ICU INTERMEDIATE R&B

## 2022-09-08 PROCEDURE — 36415 COLL VENOUS BLD VENIPUNCTURE: CPT

## 2022-09-08 PROCEDURE — 2580000003 HC RX 258: Performed by: STUDENT IN AN ORGANIZED HEALTH CARE EDUCATION/TRAINING PROGRAM

## 2022-09-08 PROCEDURE — C1751 CATH, INF, PER/CENT/MIDLINE: HCPCS

## 2022-09-08 RX ORDER — METOPROLOL SUCCINATE 25 MG/1
25 TABLET, EXTENDED RELEASE ORAL DAILY
Status: DISCONTINUED | OUTPATIENT
Start: 2022-09-08 | End: 2022-09-09 | Stop reason: HOSPADM

## 2022-09-08 RX ORDER — LISINOPRIL 2.5 MG/1
2.5 TABLET ORAL DAILY
Status: DISCONTINUED | OUTPATIENT
Start: 2022-09-08 | End: 2022-09-09 | Stop reason: HOSPADM

## 2022-09-08 RX ORDER — FLUCONAZOLE 150 MG/1
150 TABLET ORAL PRN
Qty: 5 TABLET | Refills: 0 | Status: SHIPPED | OUTPATIENT
Start: 2022-09-08 | End: 2022-09-13

## 2022-09-08 RX ORDER — SENNA AND DOCUSATE SODIUM 50; 8.6 MG/1; MG/1
2 TABLET, FILM COATED ORAL DAILY
Status: DISCONTINUED | OUTPATIENT
Start: 2022-09-08 | End: 2022-09-09 | Stop reason: HOSPADM

## 2022-09-08 RX ADMIN — Medication 300 UNITS: at 20:57

## 2022-09-08 RX ADMIN — ENOXAPARIN SODIUM 90 MG: 100 INJECTION SUBCUTANEOUS at 09:53

## 2022-09-08 RX ADMIN — FLUCONAZOLE 400 MG: 2 INJECTION, SOLUTION INTRAVENOUS at 11:05

## 2022-09-08 RX ADMIN — CETIRIZINE HYDROCHLORIDE 10 MG: 10 TABLET, FILM COATED ORAL at 09:42

## 2022-09-08 RX ADMIN — DOCUSATE SODIUM 50 MG AND SENNOSIDES 8.6 MG 2 TABLET: 8.6; 5 TABLET, FILM COATED ORAL at 09:43

## 2022-09-08 RX ADMIN — MEROPENEM 2000 MG: 1 INJECTION, POWDER, FOR SOLUTION INTRAVENOUS at 16:14

## 2022-09-08 RX ADMIN — BISACODYL 5 MG: 5 TABLET, COATED ORAL at 09:51

## 2022-09-08 RX ADMIN — DEXTROSE AND SODIUM CHLORIDE: 5; 450 INJECTION, SOLUTION INTRAVENOUS at 01:30

## 2022-09-08 RX ADMIN — METOPROLOL SUCCINATE 25 MG: 25 TABLET, EXTENDED RELEASE ORAL at 14:14

## 2022-09-08 RX ADMIN — LISINOPRIL 2.5 MG: 2.5 TABLET ORAL at 14:13

## 2022-09-08 RX ADMIN — Medication 10 ML: at 20:56

## 2022-09-08 RX ADMIN — PANTOPRAZOLE SODIUM 40 MG: 40 TABLET, DELAYED RELEASE ORAL at 06:25

## 2022-09-08 RX ADMIN — LIDOCAINE HYDROCHLORIDE 1 ML: 10 SOLUTION INTRAVENOUS at 10:45

## 2022-09-08 RX ADMIN — Medication 5 ML: at 09:52

## 2022-09-08 RX ADMIN — MEROPENEM 2000 MG: 1 INJECTION, POWDER, FOR SOLUTION INTRAVENOUS at 06:23

## 2022-09-08 RX ADMIN — ASPIRIN 81 MG: 81 TABLET, COATED ORAL at 09:42

## 2022-09-08 ASSESSMENT — PAIN SCALES - GENERAL: PAINLEVEL_OUTOF10: 0

## 2022-09-08 NOTE — PROGRESS NOTES
INPATIENT CARDIOLOGY FOLLOW-UP    Name: Trip Bae    Age: 36 y.o. Date of Admission: 9/3/2022  2:47 AM    Date of Service: 9/8/2022    Primary Cardiologist: Dr. Sonia Perez    Chief Complaint: Follow-up for elevated troponin    Interim History:  She told me that she had a mild chest discomfort this morning mainly localized to the upper sternal area that lasted about 5 minutes and resolved spontaneously currently no chest pain. She did not go for cardiac cath yesterday which was canceled due to her COVID status and also increased patient load. She denies any fever, cough, chills and no dyspnea. She is scheduled for cath today and again at the end of the day. Still with abdominal pain but significantly improved. Has developed progressive ischemic appearing EKG and clear septal wall motion abnormality noted on echo.     Review of Systems:   Negative except as described above    Problem List:  Patient Active Problem List   Diagnosis    Diverticulitis    Diverticulitis of intestine with abscess    Hydronephrosis    Psoriatic arthritis ([facility] [address]    Gastroesophageal reflux disease    Troponin I above reference range    Tobacco abuse    Diverticulitis of colon    ACS (acute coronary syndrome) (HCC)    Severe protein-calorie malnutrition (HCC)       Current Medications:    Current Facility-Administered Medications:     sennosides-docusate sodium (SENOKOT-S) 8.6-50 MG tablet 2 tablet, 2 tablet, Oral, Daily, Ryan Bautista MD, 2 tablet at 09/08/22 0943    bisacodyl (DULCOLAX) EC tablet 5 mg, 5 mg, Oral, Daily, Ryan Bautista MD, 5 mg at 09/08/22 0951    sodium chloride flush 0.9 % injection 5-40 mL, 5-40 mL, IntraVENous, 2 times per day, JEFF Snyder - CNP, 5 mL at 09/08/22 0952    sodium chloride flush 0.9 % injection 5-40 mL, 5-40 mL, IntraVENous, PRN, JEFF Snyder - CNP    0.9 % sodium chloride infusion, , IntraVENous, PRN, JEFF Snyder - CNP    heparin flush 100 UNIT/ML injection 300 Units, 3 mL, IntraVENous, 2 times per day, JEFF Snyder CNP    heparin flush 100 UNIT/ML injection 300 Units, 3 mL, IntraCATHeter, PRN, JEFF Snyder CNP    fluconazole (DIFLUCAN) in 0.9 % sodium chloride IVPB 400 mg, 400 mg, IntraVENous, Q24H, Mckinley R Lockso, DO, Last Rate: 100 mL/hr at 09/08/22 1105, 400 mg at 09/08/22 1105    aspirin EC tablet 81 mg, 81 mg, Oral, Daily, Hermelindo Olson MD, 81 mg at 09/08/22 0942    meropenem (MERREM) 2,000 mg in sodium chloride 0.9 % 100 mL IVPB, 2,000 mg, IntraVENous, Q8H, Chayo Mclain MD, Stopped at 09/08/22 0938    pantoprazole (PROTONIX) tablet 40 mg, 40 mg, Oral, QAM AC, Mckinley R Lockso, DO, 40 mg at 09/08/22 1832    cetirizine (ZYRTEC) tablet 10 mg, 10 mg, Oral, Daily, Mckinley R Lockso, DO, 10 mg at 09/08/22 0942    atorvastatin (LIPITOR) tablet 40 mg, 40 mg, Oral, Nightly, Hermelindo Olson MD, 40 mg at 09/07/22 2215    enoxaparin (LOVENOX) injection 90 mg, 1 mg/kg, SubCUTAneous, BID, Hermelindo Olson MD, 90 mg at 09/08/22 0953    nicotine (NICODERM CQ) 21 MG/24HR 1 patch, 1 patch, TransDERmal, Daily, Mckinley R Lockso, DO, 1 patch at 09/08/22 0952    ondansetron (ZOFRAN-ODT) disintegrating tablet 4 mg, 4 mg, Oral, Q8H PRN **OR** ondansetron (ZOFRAN) injection 4 mg, 4 mg, IntraVENous, Q6H PRN, Bryn Varma MD    polyethylene glycol (GLYCOLAX) packet 17 g, 17 g, Oral, Daily PRN, Bryn Varma MD    acetaminophen (TYLENOL) tablet 650 mg, 650 mg, Oral, Q6H PRN **OR** acetaminophen (TYLENOL) suppository 650 mg, 650 mg, Rectal, Q6H PRN, Bryn Varma MD    perflutren lipid microspheres (DEFINITY) injection 1.65 mg, 1.5 mL, IntraVENous, ONCE PRN, Jordy Arnett, APRN - CNP    Physical Exam:  /80   Pulse 71   Temp 98.4 °F (36.9 °C) (Oral)   Resp 18   Ht 5' 6\" (1.676 m)   Wt 212 lb 9.6 oz (96.4 kg)   SpO2 100%   BMI 34.31 kg/m²   Wt Readings from Last 3 Encounters:   09/08/22 212 lb 9.6 oz (96.4 kg)   08/26/22 195 lb 6.4 oz (88.6 kg) 07/26/22 209 lb 7 oz (95 kg)     Appearance: Awake, alert, no acute respiratory distress  Skin: Intact, no rash  Head: Normocephalic, atraumatic  Eyes: EOMI, no conjunctival erythema  ENMT: No pharyngeal erythema, MMM, no rhinorrhea  Neck: Supple, no elevated JVP, no carotid bruits  Lungs: Clear to auscultation bilaterally. No wheezes, rales, or rhonchi. Cardiac: PMI nondisplaced, Regular rhythm with a normal rate, S1 & S2 normal, no murmurs  Abdomen: Soft, mild abdominal tenderness, +bowel sounds  Extremities: Moves all extremities x 4, no lower extremity edema  Neurologic: No focal motor deficits apparent, normal mood and affect  Peripheral Pulses: Intact posterior tibial pulses bilaterally    Intake/Output:    Intake/Output Summary (Last 24 hours) at 9/8/2022 1243  Last data filed at 9/8/2022 0844  Gross per 24 hour   Intake 100 ml   Output --   Net 100 ml     I/O this shift:  In: 100 [P.O.:100]  Out: -     Laboratory Tests:  No results for input(s): NA, K, CL, CO2, BUN, CREATININE, GLUCOSE, CALCIUM in the last 72 hours. Lab Results   Component Value Date/Time    MG 2.1 09/03/2022 08:33 AM     No results for input(s): ALKPHOS, ALT, AST, PROT, BILITOT, BILIDIR, LABALBU in the last 72 hours. No results for input(s): WBC, RBC, HGB, HCT, MCV, MCH, MCHC, RDW, PLT, MPV in the last 72 hours.     Lab Results   Component Value Date    CKTOTAL 18 (L) 09/08/2022     Lab Results   Component Value Date    INR 1.1 09/03/2022    INR 1.3 07/20/2022    PROTIME 12.4 09/03/2022    PROTIME 14.9 (H) 07/20/2022     Lab Results   Component Value Date    TSH 0.411 09/03/2022     Lab Results   Component Value Date    LABA1C 5.1 09/03/2022     No results found for: EAG  Lab Results   Component Value Date    CHOL 132 09/03/2022     Lab Results   Component Value Date    TRIG 104 09/03/2022     Lab Results   Component Value Date    HDL 34 09/03/2022    HDL 34 09/03/2022     Lab Results   Component Value Date    LDLCALC 77 09/03/2022 LDLCALC 77 2022     Lab Results   Component Value Date    LABVLDL 21 2022    LABVLDL 21 2022     No results found for: CHOLHDLRATIO  No results for input(s): PROBNP in the last 72 hours. Cardiac Tests:    EK/3/2022: Sinus bradycardia 55 bpm with some sinus arrhythmia. Normal axis and intervals. Nonspecific T wave changes. Low voltage    9/3/2022: Sinus bradycardia 45 beats minute. Normal axis and intervals. High lateral T wave inversions new from prior    Telemetry: Currently off the monitor    Labs and vitals were reviewed: Blood pressure 122/81 heart rate 60 sats 100% on room air    Labs-as above, no labs for today. Chest X-ray:     Echocardiogram:   TTE 22   Summary   Normal left ventricular size. LV systolic function is low normal.   Ejection fraction is visually estimated at 50-55%. Normal diastolic function. Hypokinesis of the mid anteroseptal, mid inferoseptal, and apical septal   walls. Mild basal septal hypertrophy. Normal right ventricular size and function. No significant valvular abnormalities. Stress test:      Cardiac catheterization-2022:      IMPRESSION:  1. Absence of epicardial coronary artery stenosis. 2.  Elevated LVEDP at 23 mmHg. 3.  Mid ventricular hypokinesis consistent with atypical Takotsubo  cardiomyopathy with an ejection fraction of 45 plus/minus 5%. RECOMMENDATIONS:  Guideline-directed medical therapy.  ----------------------------------------------------------------------------------------------------------------------------------------------------------------  IMPRESSION:  Atypical chest pain. Had 1 episode of chest pain this morning. Elevated hs-cTnT: [phone] ng/L. ACS now appears likely with abnormal EKG and WMA on echo.   Mild LV dysfunction secondary to atypical Takotsubo/stress cardiomyopathy  Remote heart catheterization per patient, no records available  QT prolongation  COVID-19 positive, asymptomatic on room air  Recent severe diverticulitis with abscess with evidence of ongoing inflammation/abscess on current CT, surgical exploration down the road  Hydronephrosis, obstructive uropathy due to entrapped ureter from sigmoid inflammation  Psoriatic arthritis, on methotrexate  Tobacco abuse  Hemodynamically stable, current blood pressure 120/80 heart rate 71    RECOMMENDATIONS:  Echo results were reviewed, as above  Cardiac cath results were reviewed, as above and discussed with the patient. Will discontinue aspirin and statin. Initiate her on guideline directed medical therapy for LV dysfunction, start on Toprol-XL 25 mg p.o. daily and lisinopril 2.5 mg p.o. daily. Repeat another echo in about a month time if the LV function does not improve then consider cardiac MRI to rule out myocarditis.   Avoid QT prolonging agents  Aggressive risk factor modification including smoking cessation recommended  Patient was strongly encouraged to quit tobacco use      Pedro Hammond MD, [facility] Cardiology

## 2022-09-08 NOTE — PROCEDURES
PICC   Catheter insertion date: 9/8/2022     Product Number:  [address] Med[employer]   Lot No: [id number]   Gauge: 17   Lumen: single   R Basilic    Vein Diameter: 0.38cm   Arm circumference at insertion site: 34cm   Catheter Length: 41cm   Internal Length: 39cm   External Catheter Length: 2cm   Ultrasound Used: yes  VPS [employer] [place] confirms PICC tip is placed in the lower 1/3 of the SVC or at the Cavoatrial junction. Floor nurse notified PICC is okay to use.    : Aurelia Rowe RN [facility]

## 2022-09-08 NOTE — PROGRESS NOTES
Spoke with Internal Medicine regarding discharge of patient. All other doctors; surgery, ID, cardiology; are okay for discharge. Awaiting decision.

## 2022-09-08 NOTE — PROGRESS NOTES
Dr. Mercy George covering for Dr. Ivan Harper notified of patient having 11 beats of VTACH no new orders.

## 2022-09-08 NOTE — PROGRESS NOTES
[id number] [facility] Hospitalist   Progress Note    Admitting Date and Time: 9/3/2022  2:47 AM  Admit Dx: Diverticulitis [K57.92]    Subjective:    Pt feels well today. States she cannot believe she has COVID-19. Denies any new concerns.      Per RN: 11 Beat rn VTACH.     ROS: denies fever, chills, cp, sob, n/v, HA unless stated above.     sennosides-docusate sodium  2 tablet Oral Daily    bisacodyl  5 mg Oral Daily    lidocaine PF  5 mL IntraDERmal Once    sodium chloride flush  5-40 mL IntraVENous 2 times per day    heparin flush  3 mL IntraVENous 2 times per day    fluconazole  400 mg IntraVENous Q24H    aspirin  81 mg Oral Daily    meropenem  2,000 mg IntraVENous Q8H    pantoprazole  40 mg Oral QAM AC    cetirizine  10 mg Oral Daily    atorvastatin  40 mg Oral Nightly    enoxaparin  1 mg/kg SubCUTAneous BID    nicotine  1 patch TransDERmal Daily     sodium chloride flush, 5-40 mL, PRN  sodium chloride, , PRN  heparin flush, 3 mL, PRN  ondansetron, 4 mg, Q8H PRN   Or  ondansetron, 4 mg, Q6H PRN  polyethylene glycol, 17 g, Daily PRN  acetaminophen, 650 mg, Q6H PRN   Or  acetaminophen, 650 mg, Q6H PRN  perflutren lipid microspheres, 1.5 mL, ONCE PRN         Objective:    /80   Pulse 71   Temp 98.4 °F (36.9 °C) (Oral)   Resp 18   Ht 5' 6\" (1.676 m)   Wt 212 lb 9.6 oz (96.4 kg)   SpO2 100%   BMI 34.31 kg/m²   General Appearance: alert and oriented to person, place and time and in no acute distress  Skin: warm and dry  Head: normocephalic and atraumatic  Eyes: pupils equal, round, and reactive to light, extraocular eye movements intact, conjunctivae normal  Neck: neck supple and non tender without mass   Pulmonary/Chest: clear to auscultation bilaterally- no wheezes, rales or rhonchi, normal air movement, no respiratory distress  Cardiovascular: normal rate, normal S1 and S2 and no RGM  Abdomen: soft, non-tender, non-distended, normal bowel sounds,  Extremities: no cyanosis, no clubbing and no edema  Neurologic: no cranial nerve deficit and speech normal      No results for input(s): NA, K, CL, CO2, BUN, CREATININE, GLUCOSE, CALCIUM in the last 72 hours. No results for input(s): ALKPHOS, PROT, LABALBU, BILITOT, AST, ALT in the last 72 hours. No results for input(s): WBC, RBC, HGB, HCT, MCV, MCH, MCHC, RDW, PLT, MPV in the last 72 hours. Radiology:   CT ABDOMEN PELVIS W IV CONTRAST Additional Contrast? None   Final Result   1. Ongoing more late subacute phase of severe complicated diverticulitis of   the mid distal sigmoid colon as above commented. 2.  Diminished number of a pockets of extraluminal air which indicates the   well contained perforations. The there are significant tethering of the   inflammatory process towards the root of the mesentery which also tethered is   adjacent mid small bowel segments and the left-sided the bladder and the   fundal region of the vagina. Cannot exclude a component of a fistula between   the sigmoid colon and these structures. 3.  Entrapped mid distal 3rd of the left ureter by the inflammatory process   from the sigmoid colon causing continued obstructive uropathy in the left   kidney. 4.  Visualization of the right and left ovaries they have unremarkable   appearance otherwise the locating upper pelvic region. Patient had previous   hysterectomy. Assessment:  Principal Problem:    Diverticulitis  Active Problems:    Troponin I above reference range    Tobacco abuse    Diverticulitis of colon    ACS (acute coronary syndrome) (HCC)    Severe protein-calorie malnutrition (HCC)  Resolved Problems:    * No resolved hospital problems. *      Plan:  Perforated Sigmoid diverticulitis/Intra-abdominal Abscess- Recurrence of Abscess 2/2 perforation. Continue meropenem 2g IV Q8. Maintain PICC. Sigmoid resection when medically stable. Timing TBD. GS/ID input appreciated.    Left-Sided Obstructive Uropathy- 2/2 entrapped ureter from sigmoid inflammation. Elevated Troponin- HS Troponin 226>187>182>149>111. Likely ACS. Noted abnormal EKG. 9/6/22 S/P Left Heart cath. Absence of epicardial coronary artery stenosis. Mid ventricular hypokinesis consistent with atypical Takotsubo cardiomyopathy. EF 45+-5%. Toprol-XL 25 mg p.o. daily and lisinopril 2.5 mg p. o. Daily. GERD- PPI  VTACH- BMP Mg+ pending. No further issues. Telemetry monitoring. Cardiology input appreciated. COVID-19 Viral Pneumonia- Asymptomatic. Continue Droplet plus isolation. NOTE: This report was transcribed using voice recognition software. Every effort was made to ensure accuracy; however, inadvertent computerized transcription errors may be present. Electronically signed by Brett Arellano CNP on 9/8/2022 at 9:58 AM    Addendum: I have personally participated in the history, exam, medical decision making with Aliza Barroso on the date of service and I agree with all of the pertinent clinical information unless otherwise noted. I have also reviewed and agree with the past medical, family, and social history unless otherwise noted. Patient was admitted with  abdominal pain     PHYSICAL EXAM:  Vitals:  /80   Pulse 71   Temp 98.4 °F (36.9 °C) (Oral)   Resp 18   Ht 5' 6\" (1.676 m)   Wt 212 lb 9.6 oz (96.4 kg)   SpO2 100%   BMI 34.31 kg/m²   Gen: awake, alert, NAD  Lungs: clear to auscultation bilaterally no crackles no wheezing. Heart: RRR, no murmur   Abdomen: soft nontender nondistended positive bowel sounds. Extremities: full range of motion no peripheral edema. Impression:  Principal Problem:    Diverticulitis  Active Problems:    Troponin I above reference range    Tobacco abuse    Diverticulitis of colon    ACS (acute coronary syndrome) (HCC)    Severe protein-calorie malnutrition ([address]  Resolved Problems:    * No resolved hospital problems.  *        My findings/plan include:     Perforated sigmoid diverticulitis with intra-abdominal abscess - Continue merrem via PICC. As per surgery patient will need sigmoid resection  Asymptomatic COVIF-19 infection  NSTEMI II 2/2 atypical takotsubo cardiomyopathy        NOTE: This report was transcribed using voice recognition software. Every effort was made to ensure accuracy; however, inadvertent computerized transcription errors may be present.      Electronically signed by Virginia Mcclure DO on 9/8/2022 at 9:49 AM

## 2022-09-08 NOTE — DISCHARGE INSTRUCTIONS
[address] [place] [address] [facility]  ([address])  [address], [address] [facility]  Phone [phone]   Fax [phone]    Abhijeet Chaconkyaw. Gregory Brennan MD, Rupesh Bergman, MD Aaron Mcdermott, MD Christy Maharaj, MD Cira Araiza, MD Chalo Wood, MD Neo Stokes, CNS   Otto Ardon, APRN, CNS  Gloria Ramirez, APRN-CNP    Ubaldo Ye, APRN, CNS  Chandra Ferreira, APRN-CNP               STANDING ORDERS (ID Protocol)     Visiting nurses are to write the Primary Care Physician and their own call back number on all laboratory requisition forms. Abnormal lab values are called to the physician by the nurse and NOT by the laboratory. Fax all labs to the office in a timely manner, during office hours. All faxes should include nurses name and call back number. Vascular Access Devices or VADs (TLC, PICC, Midline, etc) will be replaced as necessary. Draw all blood work from VADs, except for drug levels. If unable to access a VAD, insert a peripheral catheter temporarily. Contact the Primary Care Physician or [facility] office for surgical referral.  Use tPA (Nam Bledsoe) as per agency protocol to restore patency of VAD. Saline flush 10ml or heparin flush 10U/cc IV daily and as needed to maintain line patency. Remove VAD upon completion of IV antibiotics, unless otherwise specified by the ordering physician. If VAD cannot be removed, schedule appointment at office for removal.  If VAD was placed by Radiology, schedule appointment for removal.  Notify ordering physician or office if patient requires admission to the hospital with reason for admission. Discontinue all blood work upon completion of IV antibiotics, unless otherwise specified by ordering physician. Notify ordering physician if the patient does not receive the scheduled antibiotic for 24 hours or more.   The Pharmacy and [address] [place] [address] may adjust the timing of the infusion and blood work to accommodate the patients home care conditions. When PICC or VAD is to be removed, documentation of length of inserted PICC. PICC or VAD length is to correlate with inserted length and sent to physician at the time of removal.  Give the patient a list of antibiotics being administered with:  Drug name  Route  Frequency  Start/Stop date      ROUTINE LABS TO BE DRAWN/ORDERED:  Twice weekly (preferably every Monday & Thursday):  CMP  Complete Blood Count with differential (CBC with dif)  Once weekly:  C-Reactive Protein (not high sensitivity CRP)  Erythrocyte/Westergren Sedimentation Rate (WSR or ESR)  Total CPK for patients on Daptomycin (Cubicin®). Obtain CPK more often if the patient is experiencing muscle weakness or myalgias. Clinical Pharmacist is to adjust Vancomycin and Aminoglycosides. Clinical pharmacist is  encouraged to follow: \"Therapeutic Monitoring of Vancomycin for Serious Methicillin-resistant Staphylococcus aureus Infections: A Revised Consensus Guideline and Review by the American Society of Health-system Pharmacists, the Infectious Diseases Society of [facility], the Pediatric Infectious Diseases Society, and the Society of Infectious Diseases Pharmacists\" (https://doi.org/10.1093/betty/pfvr330). If a clinical calculator is not available, clinical pharmacist is to follow the orders below:  Draw Vancomycin trough 30 minutes before the third dose  After starting drug, or   After the dosing or interval is changed. If the trough level is between 5 and 20 continue dose as ordered. Draw troughs twice weekly thereafter until troughs are stable (i.e. until trough is between 10 and 20 mcg/ml for two consecutive laboratory values). Once stable check troughs once weekly or every third dose. Please do not call physician unless the trough is < 5 or >20. If the trough is <20 continue dosing as ordered. If the trough is >20 call the office for further orders.   Do not hold the dose while waiting for the trough result. Amingoglycosides (e.g. Gentamicin, Tobramycin and Amikacin) peaks and troughs should be drawn twice weekly (preferably on Mondays and Thursdays) or every third dose. Aminoglycoside peaks are not to be drawn if patient on Once-Daily Dosing (ODD). Call physician or office if the trough is:     >1 for gentamicin,   >2 for tobramycin, or   >5 for amikacin  When clinically indicated obtain:  Urine culture. If the patient has a fever with purulent drainage from [place] or suprapubic catheter, or foul smelling urine. Do not irrigate a clogged [place] catheter. Replace it. Blood cultures and Wound Gram stain with culture & sensitivity. If the patient has a fever or increasing drainage or foul odor from a wound. Notify the treating physician in a timely manner  Stool specimen. If diarrhea occurs while on antibiotics, send stools for C. difficile and WBCs. When a drug is discontinued due to a low white blood cell count (WBCs) draw two consecutive CBC with differential and BUN, Ceatinine. ALLERGIC OR ADVERSE REACTIONS TO MEDICATIONS  Mild reaction: (itching, with or without rash):  Administer Benadryl 50mg po x 1, then 25mg po q6h prn. Notify office or physician in a timely matter. Moderate reaction (itching with or without rash and/or wheezing, dyspnea, itchy throat):  Administer Benadryl 50 mg IV push x 1. Notify office or physician in a timely manner. Severe reaction i.e. Anaphylaxis (wheezing or stidor, sudden rash, lightheadedness, hypotension):  Administer epinephrine subcutaneous 0.3mg (1:1000) x 1 dose. May repeat twice every 5 minutes if needed. Call EMS and notify office or physician immediately. For all above reactions: administer Solu-Cortef 100mg slow IV push x 1. VASCULAR ACCESS DRESSING CHANGE PROTOCOL  Cleanse insertion site with ChlorPrep® or equivalent three times. Secure catheter with Steri-Strips®, Bone®, or equivalent securing device.   Apply Opsite® 3000 or equivalent transparent dressing. Change dressing twice weekly, maintaining sterile technique. If there is a BioPatch®, SilverSite® or equivalent, change once weekly only or as needed. FOLLOW-UP VISITS  Nursing staff should call the office during business hours to schedule a follow-up appointment once the patient is admitted to the service or facility. Every effort should be made to have patient follow-up within 2 weeks of discharge. Exception is made for ventilator-dependent patients. Continue IV antibiotic therapy until patient is seen in the office or unless specific stop date is noted on the original order or unless otherwise ordered by physician. Call office to ensure stop date is correct before stopping antibiotics.         Rosalind Estrada MD

## 2022-09-08 NOTE — PROGRESS NOTES
Infectious Disease  Progress Note  [facility]    Chief Complaint: diverticulitis with intraabdominal abscess. Subjective:    Laying in bed, on the phone  Hoping to go home today   No new complaints this afternoon   Afebrile     Scheduled Meds:   sennosides-docusate sodium  2 tablet Oral Daily    bisacodyl  5 mg Oral Daily    metoprolol succinate  25 mg Oral Daily    lisinopril  2.5 mg Oral Daily    sodium chloride flush  5-40 mL IntraVENous 2 times per day    heparin flush  3 mL IntraVENous 2 times per day    fluconazole  400 mg IntraVENous Q24H    meropenem  2,000 mg IntraVENous Q8H    pantoprazole  40 mg Oral QAM AC    cetirizine  10 mg Oral Daily    nicotine  1 patch TransDERmal Daily     Continuous Infusions:   sodium chloride       PRN Meds:sodium chloride flush, sodium chloride, heparin flush, ondansetron **OR** ondansetron, polyethylene glycol, acetaminophen **OR** acetaminophen, perflutren lipid microspheres    ROS:  As mentioned in subjective, all other systems negative      /80   Pulse 71   Temp 98.4 °F (36.9 °C) (Oral)   Resp 18   Ht 5' 6\" (1.676 m)   Wt 212 lb 9.6 oz (96.4 kg)   SpO2 100%   BMI 34.31 kg/m²     Physical Exam   Constitutional: The patient is awake, alert, and oriented. Laying in bed. In no distress. Skin: Warm and dry. No rashes were noted. No jaundice. HEENT: Eyes show round, and reactive pupils. Moist mucous membranes, no ulcerations, no thrush. Neck: Supple to movements. No lymphadenopathy. Chest: No use of accessory muscles to breathe. Symmetrical expansion. Auscultation reveals no wheezing, crackles, or rhonchi. Cardiovascular: S1 and S2 are rhythmic and regular. No murmurs appreciated. Abdomen: Soft tenderness left lower quadrant  Extremities: No clubbing, no cyanosis, no edema.   Musculoskeletal: No gross focal abnormalities  Neurological:, Oriented x3  Lines: peripheral   PICC R basilic 4/2/77 64RZ    Labs, Cultures reviewed  Radiology reviewed    Microbiology:   Blood cx 9/4: negative so far      Assessment:  Perforated sigmoid diverticulitis with intra-abdominal abscess: Abscess recurred within a week off of antibiotics because of nonhealed perforation. Asymptomatic COVID-19 infection:  Psoriatic arthritis on methotrexate: Off for almost a month  NSTEMI: s/p heart cath 9/7/22. Plan:    Continue Meropenem 2 gr IV q 8    Will give script for oral Diflucan 150mg prn for 5 doses for patients frequent yeast infections while on antibiotics   Surgery following   Will follow with you. DC plan: Home with [facility], medication reconciled for DC  Okay to DC from ID POV; F/U with Dr. Andres Osborn in [facility], will get repeat CT Abd prior to appointment     Electronically signed by JEFF Dowling CNP on 9/8/2022 at 1:03 PM   Pt seen and examined. Above discussed agree with advanced practice nurse. Labs, cultures, and radiographs reviewed. Face to Face encounter occurred. Changes made as necessary.      Ese Molina MD

## 2022-09-08 NOTE — CARE COORDINATION
S/p cardiac Cath 9/7. Continues on iv abx-await final abx [facility] following- will need HHC orders on discharge- will need sign abx and pharmacy agreement faxed to [phone] has 100% drug coverage. PICC inserted today. Surgery following- await possible OR plan . Plan remains to return home w/ her , daughter, son and grandson on discharge. Will follow  Elif Wise RNcase manager    [phone]: Ata García script faxed to [facility] [phone]. [address] [facility] Agreement given to nursing to obtain pt's signature- form will need faxed to [phone].  Elif Wise RNcase manager

## 2022-09-08 NOTE — PROGRESS NOTES
GENERAL SURGERY  DAILY PROGRESS NOTE  9/8/2022    CHIEF COMPLAINT:  Recurrent diverticulitis    SUBJECTIVE:  Cardiac cath completed yesterday, consistent with Takotsubo cardiomyopathy. Abdomen is feeling better this morning. OBJECTIVE:  /88   Pulse 69   Temp 98.1 °F (36.7 °C) (Oral)   Resp 18   Ht 5' 6\" (1.676 m)   Wt 212 lb 9.6 oz (96.4 kg)   SpO2 100%   BMI 34.31 kg/m²     General Appearance:  awake, alert, oriented, in no acute distress  Skin:  Skin color, texture, turgor normal  Head/face:  NCAT  Eyes:  No gross abnormalities. Sclera nonicteric  Lungs/Chest:  Normal expansion. No respiratory distress. On room air  Heart: Warm throughout. Regular rate   Abdomen:  Soft, non-tender, non- distended. Extremities: Extremities warm to touch, pink    ASSESSMENT/PLAN:  36 y.o. female who presents with recurrent diverticulitis. - antibiotics per ID  - Recommend elective sigmoid resection once acute episode clears and cardiomyopathy improves. No emergent indication for surgery at this time. No plans for inpatient operation. - continue bowel regimen    Electronically signed by Yesenia Chisholm MD on 9/8/2022 at 2:58 PM         Attending Physician Statement:    Chief Complaint: diverticulitis    I have examined the patient and performed the key aspects of physical exam, reviewed the record (including all pertinent and new radiology images and laboratory findings), and discussed the case with the surgical team.  I agree with the assessment and plan with the following additions, corrections, and changes. 14pt review of symptoms completed and negative except as mentioned. Feeling better. Cath results reviewed. Feeling much better. Minimal LLQ ttp. Cont IV abx. Ideally can get out 3 months before surgery, but would need cardiology okay as well. Bowel regimen, low fiber diet.     Lito Vernon MD  09/08/22  5:00 PM    NOTE: This report, in part or full, may have been transcribed using voice recognition software. Every effort was made to ensure accuracy; however, inadvertent computerized transcription errors may be present. Please excuse any transcriptional grammatical or spelling errors that may have escaped my editorial review.

## 2022-09-09 VITALS
HEART RATE: 84 BPM | DIASTOLIC BLOOD PRESSURE: 88 MMHG | HEIGHT: 66 IN | BODY MASS INDEX: 33.68 KG/M2 | RESPIRATION RATE: 18 BRPM | TEMPERATURE: 97.4 F | OXYGEN SATURATION: 98 % | SYSTOLIC BLOOD PRESSURE: 110 MMHG | WEIGHT: 209.6 LBS

## 2022-09-09 LAB
ANION GAP SERPL CALCULATED.3IONS-SCNC: 8 MMOL/L (ref 7–16)
BLOOD CULTURE, ROUTINE: NORMAL
BUN BLDV-MCNC: 6 MG/DL (ref 6–20)
CALCIUM SERPL-MCNC: 8.5 MG/DL (ref 8.6–10.2)
CHLORIDE BLD-SCNC: 107 MMOL/L (ref 98–107)
CO2: 24 MMOL/L (ref 22–29)
CREAT SERPL-MCNC: 0.6 MG/DL (ref 0.5–1)
CULTURE, BLOOD 2: NORMAL
GFR AFRICAN AMERICAN: >60
GFR NON-AFRICAN AMERICAN: >60 ML/MIN/1.73
GLUCOSE BLD-MCNC: 93 MG/DL (ref 74–99)
HCT VFR BLD CALC: 33 % (ref 34–48)
HEMOGLOBIN: 10.6 G/DL (ref 11.5–15.5)
MAGNESIUM: 1.9 MG/DL (ref 1.6–2.6)
MCH RBC QN AUTO: 30.5 PG (ref 26–35)
MCHC RBC AUTO-ENTMCNC: 32.1 % (ref 32–34.5)
MCV RBC AUTO: 94.8 FL (ref 80–99.9)
PDW BLD-RTO: 13.4 FL (ref 11.5–15)
PHOSPHORUS: 2.9 MG/DL (ref 2.5–4.5)
PLATELET # BLD: 132 E9/L (ref 130–450)
PMV BLD AUTO: 12.2 FL (ref 7–12)
POTASSIUM SERPL-SCNC: 3.9 MMOL/L (ref 3.5–5)
RBC # BLD: 3.48 E12/L (ref 3.5–5.5)
SODIUM BLD-SCNC: 139 MMOL/L (ref 132–146)
WBC # BLD: 5.3 E9/L (ref 4.5–11.5)

## 2022-09-09 PROCEDURE — 2580000003 HC RX 258

## 2022-09-09 PROCEDURE — 83735 ASSAY OF MAGNESIUM: CPT

## 2022-09-09 PROCEDURE — 99232 SBSQ HOSP IP/OBS MODERATE 35: CPT | Performed by: INTERNAL MEDICINE

## 2022-09-09 PROCEDURE — 36415 COLL VENOUS BLD VENIPUNCTURE: CPT

## 2022-09-09 PROCEDURE — 6360000002 HC RX W HCPCS: Performed by: STUDENT IN AN ORGANIZED HEALTH CARE EDUCATION/TRAINING PROGRAM

## 2022-09-09 PROCEDURE — 6370000000 HC RX 637 (ALT 250 FOR IP): Performed by: INTERNAL MEDICINE

## 2022-09-09 PROCEDURE — 84100 ASSAY OF PHOSPHORUS: CPT

## 2022-09-09 PROCEDURE — 2580000003 HC RX 258: Performed by: STUDENT IN AN ORGANIZED HEALTH CARE EDUCATION/TRAINING PROGRAM

## 2022-09-09 PROCEDURE — 85027 COMPLETE CBC AUTOMATED: CPT

## 2022-09-09 PROCEDURE — 6370000000 HC RX 637 (ALT 250 FOR IP): Performed by: STUDENT IN AN ORGANIZED HEALTH CARE EDUCATION/TRAINING PROGRAM

## 2022-09-09 PROCEDURE — 80048 BASIC METABOLIC PNL TOTAL CA: CPT

## 2022-09-09 PROCEDURE — 6360000002 HC RX W HCPCS

## 2022-09-09 PROCEDURE — 99232 SBSQ HOSP IP/OBS MODERATE 35: CPT | Performed by: SURGERY

## 2022-09-09 RX ORDER — LISINOPRIL 2.5 MG/1
2.5 TABLET ORAL DAILY
Qty: 30 TABLET | Refills: 3 | Status: SHIPPED | OUTPATIENT
Start: 2022-09-10 | End: 2022-09-15 | Stop reason: ALTCHOICE

## 2022-09-09 RX ORDER — METOPROLOL SUCCINATE 25 MG/1
25 TABLET, EXTENDED RELEASE ORAL DAILY
Qty: 30 TABLET | Refills: 3 | Status: SHIPPED | OUTPATIENT
Start: 2022-09-10

## 2022-09-09 RX ORDER — NICOTINE 21 MG/24HR
1 PATCH, TRANSDERMAL 24 HOURS TRANSDERMAL DAILY
Qty: 30 PATCH | Refills: 3 | Status: SHIPPED | OUTPATIENT
Start: 2022-09-10 | End: 2022-09-15

## 2022-09-09 RX ADMIN — CETIRIZINE HYDROCHLORIDE 10 MG: 10 TABLET, FILM COATED ORAL at 08:53

## 2022-09-09 RX ADMIN — METOPROLOL SUCCINATE 25 MG: 25 TABLET, EXTENDED RELEASE ORAL at 08:53

## 2022-09-09 RX ADMIN — DOCUSATE SODIUM 50 MG AND SENNOSIDES 8.6 MG 2 TABLET: 8.6; 5 TABLET, FILM COATED ORAL at 08:53

## 2022-09-09 RX ADMIN — LISINOPRIL 2.5 MG: 2.5 TABLET ORAL at 08:53

## 2022-09-09 RX ADMIN — MEROPENEM 2000 MG: 1 INJECTION, POWDER, FOR SOLUTION INTRAVENOUS at 14:40

## 2022-09-09 RX ADMIN — MEROPENEM 2000 MG: 1 INJECTION, POWDER, FOR SOLUTION INTRAVENOUS at 00:44

## 2022-09-09 RX ADMIN — MEROPENEM 2000 MG: 1 INJECTION, POWDER, FOR SOLUTION INTRAVENOUS at 08:58

## 2022-09-09 RX ADMIN — PANTOPRAZOLE SODIUM 40 MG: 40 TABLET, DELAYED RELEASE ORAL at 05:38

## 2022-09-09 RX ADMIN — Medication 10 ML: at 08:55

## 2022-09-09 RX ADMIN — BISACODYL 5 MG: 5 TABLET, COATED ORAL at 09:06

## 2022-09-09 RX ADMIN — Medication 300 UNITS: at 08:54

## 2022-09-09 NOTE — PROGRESS NOTES
GENERAL SURGERY  DAILY PROGRESS NOTE  9/9/2022    CHIEF COMPLAINT:  Recurrent diverticulitis    SUBJECTIVE:  Patient with no acute events overnight. Feeling well this morning, no abdominal pain whatsoever. Tolerating diet. OBJECTIVE:  /62   Pulse 57   Temp 98.3 °F (36.8 °C) (Oral)   Resp 18   Ht 5' 6\" (1.676 m)   Wt 209 lb 9.6 oz (95.1 kg)   SpO2 97%   BMI 33.83 kg/m²     General Appearance:  awake, alert, oriented, in no acute distress  Skin:  Skin color, texture, turgor normal  Head/face:  NCAT  Eyes:  No gross abnormalities. Sclera nonicteric  Lungs/Chest:  Normal expansion. No respiratory distress. On room air  Heart: Warm throughout. Regular rate   Abdomen:  Soft, non-tender, non- distended. Extremities: Extremities warm to touch, pink    ASSESSMENT/PLAN:  36 y.o. female who presents with recurrent diverticulitis. - antibiotics per ID  - continue low fiber diet  - Recommend elective sigmoid resection once acute episode clears and cardiomyopathy improves. No emergent indication for surgery at this time. No plans for inpatient operation. - continue bowel regimen    Electronically signed by Venda Lombard, MD on 9/9/2022 at 7:21 AM     Attending Physician Statement:    Chief Complaint: diverticulitis    I have examined the patient and performed the key aspects of physical exam, reviewed the record (including all pertinent and new radiology images and laboratory findings), and discussed the case with the surgical team.  I agree with the assessment and plan with the following additions, corrections, and changes. 14pt review of symptoms completed and negative except as mentioned. Feeling well, slight LLQ discomfort. Large BM today reported. Okay for discharge. Hopefully can get out 3 months prior to resection. Will follow up in the office    Reford MD Idris  09/09/22  12:26 PM    NOTE: This report, in part or full, may have been transcribed using voice recognition software.  Every effort

## 2022-09-09 NOTE — PLAN OF CARE
Problem: Discharge Planning  Goal: Discharge to home or other facility with appropriate resources  9/8/2022 2256 by Brendon Partida RN  Outcome: Progressing  9/8/2022 1633 by Jenifer Garcias RN  Outcome: Progressing     Problem: Pain  Goal: Verbalizes/displays adequate comfort level or baseline comfort level  9/8/2022 2256 by Brendon Partida RN  Outcome: Progressing  9/8/2022 1633 by Jenifer Garcias RN  Outcome: Progressing     Problem: Safety - Adult  Goal: Free from fall injury  9/8/2022 2256 by Brendon Partida RN  Outcome: Progressing  9/8/2022 1633 by Jenifer Garcias RN  Outcome: Progressing     Problem: Nutrition Deficit:  Goal: Optimize nutritional status  9/8/2022 2256 by Brendon Partida RN  Outcome: Progressing  9/8/2022 1633 by Jenifer Garcias RN  Outcome: Progressing     Problem: ABCDS Injury Assessment  Goal: Absence of physical injury  9/8/2022 2256 by Brendon Partida RN  Outcome: Progressing  9/8/2022 1633 by Jenifer Garcias RN  Outcome: Progressing

## 2022-09-09 NOTE — CARE COORDINATION
+ COVID 9/3. Discharge to home w/  and family today.  [facility] notified of discharge- C order on chart-pt is to receive her 4 pm dose of Merrem via PICC prior to discharge and [facility] will see patient tomorrow am.  Marisa Perdomo RNcase manager

## 2022-09-09 NOTE — PROGRESS NOTES
Comprehensive Nutrition Assessment    Type and Reason for Visit:  Reassess    Nutrition Recommendations/Plan:   Continue Low  Fiber Diet, as tolerated      Malnutrition Assessment:  Malnutrition Status:  Severe malnutrition (09/06/22 0820)    Context:  Acute Illness     Findings of the 6 clinical characteristics of malnutrition:  Energy Intake:  50% or less of estimated energy requirements for 5 or more days  Weight Loss:  5% over 1 month (7% in 6 wks)     Body Fat Loss:  Unable to assess (Covid Isolation)     Muscle Mass Loss:  Unable to assess (Covid Isolation)    Fluid Accumulation:  No significant fluid accumulation     Strength:  Not Performed    Nutrition Assessment:    Pt status improving and discharge plan to home today. Plan for surgical resection when this acute episode resolves. Will add Low Fiber Diet information to Discharge Instructions, as pt remains in Covid Isolation. Nutrition Related Findings:    soft abd +BS, no edema, +I/O 7L, tolerating diet Wound Type: None       Current Nutrition Intake & Therapies:    Average Meal Intake: 51-75%  Average Supplements Intake: None Ordered  ADULT DIET; Regular; Low Fiber    Anthropometric Measures:  Height: 5' 6\" (167.6 cm)  Ideal Body Weight (IBW): 130 lbs (59 kg)    Admission Body Weight: 195 lb 12.8 oz (88.8 kg) (9/3 - 7% wt loss from UBW x 6 wks ago)  Current Body Weight: 209 lb 9.6 oz (95.1 kg), 162.5 % IBW.  Weight Source: Bed Scale (9/9)  Current BMI (kg/m2): 33.8  Usual Body Weight: 209 lb 7 oz (95 kg) (wt during July admit (~6 wks))  % Weight Change (Calculated): 0.8                    BMI Categories: Obese Class 1 (BMI 30.0-34.9) (31.5 at admit wt)    Estimated Daily Nutrient Needs:  Energy Requirements Based On: Formula ([address])  Weight Used for Energy Requirements: Admission  Energy (kcal/day):   Weight Used for Protein Requirements: Ideal  Protein (g/day): 75-85  Method Used for Fluid Requirements: 1 ml/kcal  Fluid (ml/day):     Nutrition Diagnosis:   Severe malnutrition, In context of acute illness or injury related to altered GI function as evidenced by Criteria as identified in malnutrition assessment    Nutrition Interventions:   Food and/or Nutrient Delivery: Continue Current Diet  Nutrition Education/Counseling: Education initiated ([place] Low Fiber diet info in Discharge Instructions)  Coordination of Nutrition Care: Continue to monitor while inpatient       Goals:  Previous Goal Met: Progressing toward Goal(s)  Goals: PO intake 75% or greater  Specify Other Goals: Nutrition progression    Nutrition Monitoring and Evaluation:   Behavioral-Environmental Outcomes: None Identified  Food/Nutrient Intake Outcomes: Food and Nutrient Intake  Physical Signs/Symptoms Outcomes: Biochemical Data, GI Status, Fluid Status or Edema, Nutrition Focused Physical Findings, Skin, Weight    Discharge Planning:    No discharge needs at this time     Juvenal Arias RD, [address] , [place]  Contact: [phone]

## 2022-09-09 NOTE — PROGRESS NOTES
INPATIENT CARDIOLOGY FOLLOW-UP    Name: Tracie Kendall    Age: 36 y.o. Date of Admission: 9/3/2022  2:47 AM    Date of Service: 9/9/2022    Primary Cardiologist: Dr. Shanika Rivas    Chief Complaint: Follow-up for elevated troponin    Interim History:  Patient denies any further episodes of chest pain, dyspnea, cough. She had cardiac cath on 9/7/2022 showed no coronary artery disease. Has developed progressive ischemic appearing EKG and clear septal wall motion abnormality noted on echo.     Review of Systems:   Negative except as described above    Problem List:  Patient Active Problem List   Diagnosis    Diverticulitis    Diverticulitis of intestine with abscess    Hydronephrosis    Psoriatic arthritis ([facility] [address]    Gastroesophageal reflux disease    Troponin I above reference range    Tobacco abuse    Diverticulitis of colon    ACS (acute coronary syndrome) (HCC)    Severe protein-calorie malnutrition (HCC)       Current Medications:    Current Facility-Administered Medications:     sennosides-docusate sodium (SENOKOT-S) 8.6-50 MG tablet 2 tablet, 2 tablet, Oral, Daily, Ki Michelle MD, 2 tablet at 09/09/22 0853    bisacodyl (DULCOLAX) EC tablet 5 mg, 5 mg, Oral, Daily, Ki Michelle MD, 5 mg at 09/09/22 0906    metoprolol succinate (TOPROL XL) extended release tablet 25 mg, 25 mg, Oral, Daily, Haleigh Deras MD, 25 mg at 09/09/22 0853    lisinopril (PRINIVIL;ZESTRIL) tablet 2.5 mg, 2.5 mg, Oral, Daily, Haleigh Deras MD, 2.5 mg at 09/09/22 0853    sodium chloride flush 0.9 % injection 5-40 mL, 5-40 mL, IntraVENous, 2 times per day, Beckysa JOVANNI Anguiano APRN - CNP, 10 mL at 09/09/22 0855    sodium chloride flush 0.9 % injection 5-40 mL, 5-40 mL, IntraVENous, PRN, Becky JOVANNI Anguiano APRN - CNP    0.9 % sodium chloride infusion, , IntraVENous, PRN, Becky JOVANNI Anguiano APRN - CNP, Stopped at 09/08/22 1616    heparin flush 100 UNIT/ML injection 300 Units, 3 mL, IntraVENous, 2 times per day, Denis Francois APRN - CNP, 300 Units at 09/09/22 0854    heparin flush 100 UNIT/ML injection 300 Units, 3 mL, IntraCATHeter, PRN, Becky Anguiano APRN - CNP    meropenem (MERREM) 2,000 mg in sodium chloride 0.9 % 100 mL IVPB, 2,000 mg, IntraVENous, Q8H, Dayanara Renee MD, Last Rate: 33.3 mL/hr at 09/09/22 1440, 2,000 mg at 09/09/22 1440    pantoprazole (PROTONIX) tablet 40 mg, 40 mg, Oral, QAM AC, Mckinley R Lockso, DO, 40 mg at 09/09/22 0538    cetirizine (ZYRTEC) tablet 10 mg, 10 mg, Oral, Daily, Mckinley R Lockso, DO, 10 mg at 09/09/22 0853    nicotine (NICODERM CQ) 21 MG/24HR 1 patch, 1 patch, TransDERmal, Daily, Mckinley R Lockso, DO, 1 patch at 09/09/22 0853    ondansetron (ZOFRAN-ODT) disintegrating tablet 4 mg, 4 mg, Oral, Q8H PRN **OR** ondansetron (ZOFRAN) injection 4 mg, 4 mg, IntraVENous, Q6H PRN, Bryn Varma MD    polyethylene glycol (GLYCOLAX) packet 17 g, 17 g, Oral, Daily PRN, Bryn Varma MD    acetaminophen (TYLENOL) tablet 650 mg, 650 mg, Oral, Q6H PRN **OR** acetaminophen (TYLENOL) suppository 650 mg, 650 mg, Rectal, Q6H PRN, Bryn Varma MD    perflutren lipid microspheres (DEFINITY) injection 1.65 mg, 1.5 mL, IntraVENous, ONCE PRN, JEFF Kirby - CNP    Physical Exam:  /88   Pulse 84   Temp 97.4 °F (36.3 °C) (Oral)   Resp 18   Ht 5' 6\" (1.676 m)   Wt 209 lb 9.6 oz (95.1 kg)   SpO2 98%   BMI 33.83 kg/m²   Wt Readings from Last 3 Encounters:   09/09/22 209 lb 9.6 oz (95.1 kg)   08/26/22 195 lb 6.4 oz (88.6 kg)   07/26/22 209 lb 7 oz (95 kg)     Appearance: Awake, alert, no acute respiratory distress  Skin: Intact, no rash  Head: Normocephalic, atraumatic  Eyes: EOMI, no conjunctival erythema  ENMT: No pharyngeal erythema, MMM, no rhinorrhea  Neck: Supple, no elevated JVP, no carotid bruits  Lungs: Clear to auscultation bilaterally. No wheezes, rales, or rhonchi.   Cardiac: PMI nondisplaced, Regular rhythm with a normal rate, S1 & S2 normal, no murmurs  Abdomen: Soft, mild abdominal tenderness, +bowel sounds  Extremities: Moves all extremities x 4, no lower extremity edema  Neurologic: No focal motor deficits apparent, normal mood and affect  Peripheral Pulses: Intact posterior tibial pulses bilaterally    Intake/Output:  No intake or output data in the 24 hours ending 22 1542    No intake/output data recorded. Laboratory Tests:  Recent Labs     22  1250 22  0400    139   K 3.7 3.9    107   CO2 24 24   BUN 4* 6   CREATININE 0.6 0.6   GLUCOSE 99 93   CALCIUM 8.6 8.5*       Lab Results   Component Value Date/Time    MG 1.9 2022 04:00 AM     No results for input(s): ALKPHOS, ALT, AST, PROT, BILITOT, BILIDIR, LABALBU in the last 72 hours. Recent Labs     22  1250 22  0400   WBC 5.1 5.3   RBC 3.69 3.48*   HGB 11.3* 10.6*   HCT 34.6 33.0*   MCV 93.8 94.8   MCH 30.6 30.5   MCHC 32.7 32.1   RDW 13.3 13.4    132   MPV 12.0 12.2*       Lab Results   Component Value Date    CKTOTAL 18 (L) 2022     Lab Results   Component Value Date    INR 1.1 2022    INR 1.3 2022    PROTIME 12.4 2022    PROTIME 14.9 (H) 2022     Lab Results   Component Value Date    TSH 0.411 2022     Lab Results   Component Value Date    LABA1C 5.1 2022     No results found for: EAG  Lab Results   Component Value Date    CHOL 132 2022     Lab Results   Component Value Date    TRIG 104 2022     Lab Results   Component Value Date    HDL 34 2022    HDL 34 2022     Lab Results   Component Value Date    LDLCALC 77 2022    LDLCALC 77 2022     Lab Results   Component Value Date    LABVLDL 21 2022    LABVLDL 21 2022     No results found for: CHOLHDLRATIO  No results for input(s): PROBNP in the last 72 hours. Cardiac Tests:    EK/3/2022: Sinus bradycardia 55 bpm with some sinus arrhythmia. Normal axis and intervals. Nonspecific T wave changes.   Low voltage    9/3/2022: Sinus bradycardia 45 beats minute. Normal axis and intervals. High lateral T wave inversions new from prior    Telemetry: Currently off the monitor    Labs and vitals were reviewed: Blood pressure 122/81 heart rate 60 sats 100% on room air    Labs-as above, no labs for today. Chest X-ray:     Echocardiogram:   TTE 9/4/22   Summary   Normal left ventricular size. LV systolic function is low normal.   Ejection fraction is visually estimated at 50-55%. Normal diastolic function. Hypokinesis of the mid anteroseptal, mid inferoseptal, and apical septal   walls. Mild basal septal hypertrophy. Normal right ventricular size and function. No significant valvular abnormalities. Stress test:      Cardiac catheterization-9/7/2022:      IMPRESSION:  1. Absence of epicardial coronary artery stenosis. 2.  Elevated LVEDP at 23 mmHg. 3.  Mid ventricular hypokinesis consistent with atypical Takotsubo  cardiomyopathy with an ejection fraction of 45 plus/minus 5%. RECOMMENDATIONS:  Guideline-directed medical therapy.  ----------------------------------------------------------------------------------------------------------------------------------------------------------------  IMPRESSION:  Atypical chest pain, resolved. Elevated hs-cTnT: [phone] ng/L.   Cardiac cath showed normal coronaries suggestive of stress cardiomyopathy  Mild LV dysfunction secondary to atypical Takotsubo/stress cardiomyopathy  Remote heart catheterization per patient, no records available  QT prolongation  COVID-19 positive, asymptomatic on room air  Recent severe diverticulitis with abscess with evidence of ongoing inflammation/abscess on current CT, surgical exploration down the road  Hydronephrosis, obstructive uropathy due to entrapped ureter from sigmoid inflammation  Psoriatic arthritis, on methotrexate  Tobacco abuse  Hemodynamically stable, current blood pressure 120/80 heart rate 71    RECOMMENDATIONS:  Echo results were reviewed, as above  Cardiac cath results were reviewed, as above and discussed with the patient. Will discontinue aspirin and statin. Initiated on guideline directed medical therapy for LV dysfunction, Continue on Toprol-XL 25 mg p.o. daily and lisinopril 2.5 mg p.o. daily. Hemodynamically stable  Repeat another echo in about a month time if the LV function does not improve then consider cardiac MRI to rule out myocarditis. Avoid QT prolonging agents. Aggressive risk factor modification including smoking cessation recommended  Patient was strongly encouraged to quit tobacco use  Stable for discharge from the cardiology standpoint.   Follow-up with Dr. Natalie Caraballo in the next 1 to 2 weeks      Sharan Parker MD, [address] [facility]

## 2022-09-09 NOTE — DISCHARGE SUMMARY
[address] [facility]  Hospitalist       Hospitalist Physician Discharge Summary       Lian Moser MD  [address]  [address]  [phone]    Follow up in 3 week(s)      Ashanti Echevarria MD  [address] [facility] [place] [address]  [phone]    Follow up in 1 month(s)      Marisol Amaya MD  [address]  [phone]    Follow up in 1 week(s)        Activity level: As Tolerated     Diet: ADULT DIET; Regular; Low Fiber        Condition at discharge: Stable    Dispo:Home      Patient ID:  Priya Rear  [id number]  72 y.o.  1980    Admit date: 9/3/2022    Discharge date and time:  9/9/2022  12:22 PM    Admission Diagnoses: Principal Problem:    Diverticulitis  Active Problems:    Troponin I above reference range    Tobacco abuse    Diverticulitis of colon    ACS (acute coronary syndrome) (HCC)    Severe protein-calorie malnutrition ([address]  Resolved Problems:    * No resolved hospital problems. *      Discharge Diagnoses: Principal Problem:    Diverticulitis  Active Problems:    Troponin I above reference range    Tobacco abuse    Diverticulitis of colon    ACS (acute coronary syndrome) (HCC)    Severe protein-calorie malnutrition ([address]  Resolved Problems:    * No resolved hospital problems. *      Consults:  IP CONSULT TO CARDIOLOGY  IP CONSULT TO GENERAL SURGERY  IP CONSULT TO INFECTIOUS DISEASES  IP CONSULT TO IV TEAM  IP CONSULT TO IV TEAM  IP CONSULT TO HOME CARE NEEDS    Procedures: None    Hospital Course:     52/22 36year old female presented to [facility] ED with complaints of abdominal pain beginning one day prior to presentation. Recently admitted 2weeks ago for diverticulitis and antibiotic treatment. Completed abx one week prior to presentation. Troponin 226, repeat troponin 187. Not having chest pain. EKG normal.  UA unremarkable.  CT abdomen with contrast showed ongoing late subacute phase of severe complicated diverticulitis, findings concerning for possible fistula, and trapped mid distal left ureter causing continued obstructive uropathy and left kidney. Decisions to admit pt for further evaluation and treatment. Perforated Sigmoid diverticulitis/Intra-abdominal Abscess- Recurrence of Abscess 2/2 perforation. Continue meropenem 2g IV Q8. Maintain PICC. Sigmoid resection when medically stable. Timing TBD. GS/ID input appreciated. Left-Sided Obstructive Uropathy- 2/2 entrapped ureter from sigmoid inflammation. Elevated Troponin- HS Troponin 226>187>182>149>111. Likely ACS. Noted abnormal EKG. 9/6/22 S/P Left Heart cath. Absence of epicardial coronary artery stenosis. Mid ventricular hypokinesis consistent with atypical Takotsubo cardiomyopathy. EF 45+-5%. NSTEMI Toprol-XL 25 mg p.o. daily and lisinopril 2.5 mg p. o. Daily. GERD- PPI  VTACH- BMP Mg+ pending. No further issues. Telemetry monitoring. Cardiology input appreciated. COVID-19 Viral Pneumonia- Asymptomatic. Continue Droplet plus isolation. Pt remained hemodynamically stable and can discharge at this time. Pt will be instructed to follow up with Cardiology, GS, and Pulmonology upon DC.    Discharge Exam:  Vitals:    09/08/22 2000 09/09/22 0030 09/09/22 0616 09/09/22 0745   BP: 113/69 100/62  110/88   Pulse: 66 57  84   Resp: 18 18     Temp: 97.9 °F (36.6 °C) 98.3 °F (36.8 °C)  97.4 °F (36.3 °C)   TempSrc: Oral Oral  Oral   SpO2: 99% 97%  98%   Weight:   209 lb 9.6 oz (95.1 kg)    Height:           General Appearance: alert and oriented to person, place and time and in no acute distress  Skin: warm and dry  Head: normocephalic and atraumatic  Eyes: pupils equal, round, and reactive to light, extraocular eye movements intact, conjunctivae normal  Neck: neck supple and non tender without mass   Pulmonary/Chest: clear to auscultation bilaterally- no wheezes, rales or rhonchi, normal air movement, no respiratory distress  Cardiovascular: normal rate, normal S1 and S2 and no RGM  Abdomen: soft, non-tender, non-distended, normal bowel sounds,  Extremities: no cyanosis, no clubbing and no edema  Neurologic: no cranial nerve deficit and speech normal  I/O last 3 completed shifts: In: 100 [P.O.:100]  Out: -   No intake/output data recorded. LABS:  Recent Labs     09/08/22  1250 09/09/22  0400    139   K 3.7 3.9    107   CO2 24 24   BUN 4* 6   CREATININE 0.6 0.6   GLUCOSE 99 93   CALCIUM 8.6 8.5*       Recent Labs     09/08/22  1250 09/09/22  0400   WBC 5.1 5.3   RBC 3.69 3.48*   HGB 11.3* 10.6*   HCT 34.6 33.0*   MCV 93.8 94.8   MCH 30.6 30.5   MCHC 32.7 32.1   RDW 13.3 13.4    132   MPV 12.0 12.2*       No results for input(s): POCGLU in the last 72 hours. Imaging:  No results found. Patient Instructions:   Current Discharge Medication List        START taking these medications    Details   lisinopril (PRINIVIL;ZESTRIL) 2.5 MG tablet Take 1 tablet by mouth daily  Qty: 30 tablet, Refills: 3      metoprolol succinate (TOPROL XL) 25 MG extended release tablet Take 1 tablet by mouth daily  Qty: 30 tablet, Refills: 3      nicotine (NICODERM CQ) 21 MG/24HR Place 1 patch onto the skin daily  Qty: 30 patch, Refills: 3      meropenem (MERREM) infusion Infuse 2,000 mg intravenously in the morning and 2,000 mg at noon and 2,000 mg in the evening. Do all this for 23 days. Compound per protocol. Daisy Dy: 1 each, Refills: 0           CONTINUE these medications which have CHANGED    Details   fluconazole (DIFLUCAN) 150 MG tablet Take 1 tablet by mouth as needed (take one tablet as needed for yeast infection)  Qty: 5 tablet, Refills: 0           CONTINUE these medications which have NOT CHANGED    Details   docusate sodium (COLACE) 100 MG capsule Take 100 mg by mouth daily      cetirizine (ZYRTEC) 10 MG tablet Take 10 mg by mouth in the morning.       ondansetron (ZOFRAN) 4 MG tablet Take 4 mg by mouth every 8 hours as needed for Nausea or Vomiting      omeprazole (PRILOSEC) 20 MG delayed release capsule Take 20 mg by mouth in the morning. STOP taking these medications       dicyclomine (BENTYL) 10 MG capsule Comments:   Reason for Stopping:         celecoxib (CELEBREX) 200 MG capsule Comments:   Reason for Stopping:         methotrexate Sodium 50 MG/2ML chemo injection Comments:   Reason for Stopping:         cyanocobalamin 1000 MCG/ML injection Comments:   Reason for Stopping:         folic acid (FOLVITE) 1 MG tablet Comments:   Reason for Stopping:             In Review of EMR,  evaluation, management and diagnosis. Care plan has been discussed with attending. Time spent 34  minutes. Note that more than 30 minutes was spent in preparing discharge papers, discussing discharge with patient, medication review, etc.    NOTE: This report was transcribed using voice recognition software. Every effort was made to ensure accuracy; however, inadvertent computerized transcription errors may be present. Signed:  Electronically signed by Maury Arellano CNP on 9/9/2022 at 12:22 PM     Addendum: I have personally participated in the history, exam, medical decision making with Ramsey Schaffer on the date of service and I agree with all of the pertinent clinical information unless otherwise noted. I have also reviewed and agree with the past medical, family, and social history unless otherwise noted. Patient was admitted with abdominal pain     PHYSICAL EXAM:  Vitals:  /88   Pulse 84   Temp 97.4 °F (36.3 °C) (Oral)   Resp 18   Ht 5' 6\" (1.676 m)   Wt 209 lb 9.6 oz (95.1 kg)   SpO2 98%   BMI 33.83 kg/m²   Gen: awake, alert, NAD  Lungs: clear to auscultation bilaterally no crackles no wheezing. Heart: RRR, no murmur   Abdomen: soft nontender nondistended positive bowel sounds. Extremities: full range of motion no peripheral edema.         Impression:  Principal Problem:    Diverticulitis  Active Problems:    Troponin I above reference range

## 2022-09-09 NOTE — DISCHARGE INSTR - DIET
Good nutrition is important when healing from an illness, injury, or surgery. Follow any nutrition recommendations given to you during your hospital stay. If you were given an oral nutrition supplement while in the hospital, continue to take this supplement at home. You can take it with meals, in-between meals, and/or before bedtime. These supplements can be purchased at most local grocery stores, pharmacies, and chain [employer]-stores. If you have any questions about your diet or nutrition, call the hospital and ask for the dietitian. Low Fiber Diet:    You may need a low-fiber diet if you have Crohn's disease, diverticulitis, gastroparesis, ulcerative colitis, a new colostomy, or new ileostomy. A low-fiber diet may also be needed following radiation therapy to the pelvis and lower bowel or recent intestinal surgery. A low-fiber diet reduces the frequency and volume of your stools. This lessens irritation to the gastrointestinal (GI) tract and can help you heal. Use this diet if you have a stricture so your intestine doesn't get blocked. The goal of this diet is to get less than 8 grams of fiber daily. It's also important to eat enough protein foods while you are on a low-fiber diet. Drink nutrition supplements that have 1 gram of fiber or less in each serving. If your stricture is severe or if your inflammation is severe, drink more liquids to reduce symptoms and to get enough calories and protein. Tips  Eat about 5 to 6 small meals daily or about every 3 to 4 hours. Do not skip meals. Every time you eat, include a small amount of protein (1 to 2 ounces) plus an additional food. Low fiber starch foods are the best choice to eat with protein. Limit acidic, spicy and high-fat or fried and greasy foods to reduce GI symptoms. Do not eat raw fruits and vegetables while on this diet. All fruits and vegetables need to be cooked and without peels or skins.   Drink a lot of fluids, at least 8 cups of fluid each day. Limit drinks with caffeine, sugar, and sugar substitutes. Plain water is the best choice. Avoid mixing drink packets or flavor drops into water. Take a chewable multivitamin with minerals. Gummy vitamins do not have enough minerals and can block an ostomy and non-chewable supplements are not easily digested. Chewable supplements must be used if you have a stricture or ostomy. If you are lactose intolerant, you may need to eat low-lactose dairy products. If you can't tolerate dairy, ask your RDN about how you can get enough calcium from other foods. Do not take a calcium supplement. They can cause a blockage. It is important to add high-calcium foods gradually to your diet and monitor for symptoms to avoid a blockage. Do not add more fiber to your diet until your health care provider or registered dietitian nutritionist (RDN) tells you it's OK. Fiber is part of whole grains, fruits and vegetables (foods from plants) and needs to be slowly added back into your diet when your body is healed. Choose foods that have been safely handled and prepared to lower your risk of foodborne illness. Talk to your RDN or see the Food Safety Nutrition Therapy handout for more information. Foods Recommended  These foods are low in fat and fiber and will help with your GI symptoms. Food Group Foods Recommended   Grains Choose grain foods with less than 2 grams of fiber per serving.   Refined white flour products--for example, enriched white bread without seeds, crackers or pasta  Cream of wheat or rice  Grits (fine ground)  Tortillas: white flour or corn  White rice, well-cooked (do not rinse, or soak before cooking)  Cold and hot cereals made from white or refined flour such as crispy rice or corn flakes   Protein Foods Lean, very tender, well-cooked poultry or fish; red meats: beef, pork or lamb (slow cook until soft; chop meats if you have stricture or ostomy)  Eggs, well-cooked  Smooth nut butters such as almond, peanut, or sunflower  Tofu   Dairy  If you have lactose intolerance, drinking milk products from cows or goats may make diarrhea worse. Foods marked with an asterisk (*) have lactose. Milk: fat-free, 1% or 2% * (choose best tolerated)  Lactose-free milk  Buttermilk*  Fortified non-dairy milks: almond, cashew, coconut, or rice (be aware that these options are not good sources of protein so you will need to eat an additional protein food)  Kefir* (Don't include kefir in the diet until approved by your health care provider)  Yogurt*/lactose-free yogurt (without nuts, fruit, granola or chocolate)  Mild cheese* (hard and aged cheeses tend to be lower in lactose such as cheddar, swiss or parmesan)  Cottage cheese* or lactose-free cottage cheese  Low-fat ice cream* or lactose-free ice cream  Sherbet* (usually lower lactose)   Vegetables Canned and well-cooked vegetables without seeds, skins, or hulls                           Carrots or green beans, cooked  White, red or yellow potatoes without skins  Strained vegetable juice   Fruit Soft, and well-cooked fruits without skins, seeds, or membranes  Canned fruit in juice: peaches, pears, or applesauce  Fruit juice without pulp diluted by half with water may be tolerated better  Fruit drinks fortified with vitamin C may be tolerated better than 100% fruit juice   Oils When possible, choose healthy oils and fats, such as olive and canola oils, plant oils rather than solid fats. Other Broth and strained soups made from allowed foods  Desserts (small portions) without whole grains, seeds, nuts, raisins, or coconut  Jelly (clear)        Foods Not Recommended  These foods are higher in fat and fiber and may make your GI symptoms worse.      Food Group Foods Not Recommended   Grains Bread, whole wheat or with whole grain flour or seeds or nuts  Brown rice, quinoa, kasha, barley  Tortillas: whole grain  Whole wheat pasta  Whole grain and high-fiber cereals, including oatmeal, bran flakes or shredded wheat  Popcorn   Protein Foods Steak, pork chops, or other meats that are fatty or have gristle  Fried meat, poultry, or fish  Seafood with a tough or rubbery texture, such as shrimp  Luncheon meats such as bologna and salami  Sausage, lockhart, or hot dogs  Dried beans, peas, or lentils  Hummus  Sushi  Nuts and chunky nut butters   Dairy  Whole milk  Pea milk and soymilk (may cause diarrhea, gas, bloating, and abdominal pain)  Cream  Half-and-half  Sour cream  Yogurt with added fruit, nuts, or granola or chocolate   Vegetables Alfalfa or bean sprouts (high fiber and risk for bacteria)  Raw or undercooked vegetables: beets; broccoli; brussels sprouts; cabbage; cauliflower; froylan, mustard, or turnip greens; corn; cucumber; green peas or any kind of peas; kale; lima beans; mushrooms; okra; olives; pickles and relish; onions; parsnips; peppers; potato skins; sauerkraut; spinach; tomatoes   Fruit Raw fruit  Dried fruit  Avocado, berries, coconut  Canned fruit in syrup  Canned fruit with mandarin oranges, papaya or pineapple  Fruit juice with pulp  Prune juice  Fruit skin   Oils Pork rinds     Low-Fiber (8 grams) Sample 1-Day Menu   Breakfast ½ cup cream of wheat (0.5 gram fiber)  1 slice white toast (1 gram fiber)  1 teaspoon margarine, soft tub  2 scrambled eggs   Morning Snack 1 cup lactose-free nutrition supplement   Lunch 2 slices white bread (2 grams fiber)  3 tablespoons tuna  1 tablespoon mayonnaise  1 cup chicken noodle soup (1 gram fiber)  ½ cup apple juice   Afternoon Snack 6 saltine crackers (0.5 gram fiber)  2 ounces low-fat cheddar cheese   Evening Meal 3 ounces tender chicken breast  1 cup white rice (0.5 gram fiber)  ½ cup cooked canned green beans (2 grams fiber)  ½ cup cranberry juice   Evening Snack 1 cup lactose-free nutrition supplement

## 2022-09-09 NOTE — PROGRESS NOTES
Infectious Disease  Progress Note  [facility]    Chief Complaint: diverticulitis with intraabdominal abscess. Subjective:    Laying in bed  Molly Byrd she had a bowel movement this AM   Going to discharge his evening   No new complaints  Afebrile     Scheduled Meds:   sennosides-docusate sodium  2 tablet Oral Daily    bisacodyl  5 mg Oral Daily    metoprolol succinate  25 mg Oral Daily    lisinopril  2.5 mg Oral Daily    sodium chloride flush  5-40 mL IntraVENous 2 times per day    heparin flush  3 mL IntraVENous 2 times per day    meropenem  2,000 mg IntraVENous Q8H    pantoprazole  40 mg Oral QAM AC    cetirizine  10 mg Oral Daily    nicotine  1 patch TransDERmal Daily     Continuous Infusions:   sodium chloride Stopped (09/08/22 1616)     PRN Meds:sodium chloride flush, sodium chloride, heparin flush, ondansetron **OR** ondansetron, polyethylene glycol, acetaminophen **OR** acetaminophen, perflutren lipid microspheres    ROS:  As mentioned in subjective, all other systems negative      /88   Pulse 84   Temp 97.4 °F (36.3 °C) (Oral)   Resp 18   Ht 5' 6\" (1.676 m)   Wt 209 lb 9.6 oz (95.1 kg)   SpO2 98%   BMI 33.83 kg/m²     Physical Exam   Constitutional: The patient is awake, alert, and oriented. Laying in bed. In no distress. Skin: Warm and dry. No rashes were noted. No jaundice. HEENT: Eyes show round, and reactive pupils. Moist mucous membranes, no ulcerations, no thrush. Neck: Supple to movements. No lymphadenopathy. Chest: No use of accessory muscles to breathe. Symmetrical expansion. Auscultation reveals no wheezing, crackles, or rhonchi. Cardiovascular: S1 and S2 are rhythmic and regular. No murmurs appreciated. Abdomen: Soft tenderness left lower quadrant - improving   Extremities: No clubbing, no cyanosis, no edema.   Musculoskeletal: No gross focal abnormalities  Neurological:, Oriented x3  Lines: peripheral   PICC R basilic 1/0/52 20EX    Labs, Cultures reviewed  Radiology

## 2022-09-12 ENCOUNTER — CARE COORDINATION (OUTPATIENT)
Dept: CARE COORDINATION | Age: 42
End: 2022-09-12

## 2022-09-12 NOTE — CARE COORDINATION
[facility] Transitions Initial Follow Up Call    Call within 2 business days of discharge: Yes    Patient: Juanell Soulier Patient : 1980   MRN: [id number]  Reason for Admission: Abdl pain, Covid  Discharge Date: 22 RARS: Readmission Risk Score: 13.1      Last Discharge [facility]       Date Complaint Diagnosis Description Type Department Provider    9/3/22 Diverticulitis, completed antibiotics 2 weeks ago, pain resolved and resumed today Diverticulitis of colon . .. ED to Hosp-Admission (Discharged) (ADMITTED) [facility]W Bertha Juarez Danger, DO; Melinda Crowder. .. Spoke with: patient  Birgit Quiñones states that she is feeling good. She denies any abdominal pain at the current time. Denies any fever, chills, nausea or vomiting. She does have a PICC line in place to receive Meropenum infusions. Home health was out today and changed the dressing and stated that the site is free of any s/s of infection. Regarding being positive Covid, she continues to be completely asymptomatic. After Visit  Summary reviewed with patient. All medications written on discharge have been filled and patient is taking them as written. Medications were reviewed. She has stopped the medications listed on AVS as recommended. Appetite:  Stated that her appetite is getting better. Bowels/bladder:  States no bowel or bladder elimination concerns at this time. Follow up appointments:  She has HFU with cardiology 9/15. She stated that she will be calling PCP this afternoon for HFU. She also has appointments scheduled with ID and Gastro in the future and stated that she will be contacting them to move the appointments up. Assistance offered, patient declined. Contact information given. Patient has no other concerns or needs at this time.     [facility]    Non-face-to-face services provided:  Obtained and reviewed discharge summary and/or continuity of care documents    Transitions of Care Initial Call    Was this an external facility discharge? No Discharge Facility: [facility]      Challenges to be reviewed by the provider   Additional needs identified to be addressed with provider: No  none             Method of communication with provider : none    Advance Care Planning:   Does patient have an Advance Directive: reviewed and current. Care Transition Nurse contacted the patient by telephone to perform post hospital discharge assessment. Verified name and  with patient as identifiers. Provided introduction to self, and explanation of the CTN role. CTN reviewed discharge instructions, medical action plan and red flags with patient who verbalized understanding. Patient given an opportunity to ask questions and does not have any further questions or concerns at this time. Were discharge instructions available to patient? Yes. Reviewed appropriate site of care based on symptoms and resources available to patient including: PCP  Specialist  When to call 911. The patient agrees to contact the PCP office for questions related to their healthcare. Medication reconciliation was performed with patient, who verbalizes understanding of administration of home medications. Advised obtaining a 90-day supply of all daily and as-needed medications. Was patient discharged with a pulse oximeter? no    CTN provided contact information. No further follow-up call indicated based on severity of symptoms and risk factors.       Care Transitions 24 Hour Call    Schedule Follow Up Appointment with PCP: Completed  Do you have a copy of your discharge instructions?: Yes  Do you have all of your prescriptions and are they filled?: Yes  Have you been contacted by a [address]?: No  Have you scheduled your follow up appointment?: Yes  How are you going to get to your appointment?: Car - drive self  Do you feel like you have everything you need to keep you well at home?: Yes  Care Transitions Interventions  No Identified Needs

## 2022-09-15 ENCOUNTER — OFFICE VISIT (OUTPATIENT)
Dept: CARDIOLOGY CLINIC | Age: 42
End: 2022-09-15
Payer: MEDICAID

## 2022-09-15 VITALS
SYSTOLIC BLOOD PRESSURE: 102 MMHG | HEART RATE: 70 BPM | HEIGHT: 66 IN | OXYGEN SATURATION: 99 % | RESPIRATION RATE: 16 BRPM | WEIGHT: 197 LBS | DIASTOLIC BLOOD PRESSURE: 60 MMHG | BODY MASS INDEX: 31.66 KG/M2

## 2022-09-15 DIAGNOSIS — R94.31 QT PROLONGATION: ICD-10-CM

## 2022-09-15 DIAGNOSIS — I42.8 NONISCHEMIC CARDIOMYOPATHY (HCC): Primary | ICD-10-CM

## 2022-09-15 DIAGNOSIS — I51.81 TAKOTSUBO CARDIOMYOPATHY: ICD-10-CM

## 2022-09-15 PROCEDURE — 1111F DSCHRG MED/CURRENT MED MERGE: CPT | Performed by: NURSE PRACTITIONER

## 2022-09-15 PROCEDURE — 4004F PT TOBACCO SCREEN RCVD TLK: CPT | Performed by: NURSE PRACTITIONER

## 2022-09-15 PROCEDURE — G8417 CALC BMI ABV UP PARAM F/U: HCPCS | Performed by: NURSE PRACTITIONER

## 2022-09-15 PROCEDURE — G8427 DOCREV CUR MEDS BY ELIG CLIN: HCPCS | Performed by: NURSE PRACTITIONER

## 2022-09-15 PROCEDURE — 93000 ELECTROCARDIOGRAM COMPLETE: CPT | Performed by: INTERNAL MEDICINE

## 2022-09-15 PROCEDURE — 99214 OFFICE O/P EST MOD 30 MIN: CPT | Performed by: NURSE PRACTITIONER

## 2022-09-15 NOTE — Clinical Note
Needs prior auth for Bryson Mere Also has [facility] care drawing labs bi-weekly as she currently has PICC line and getting antibiotics - can we get these labs to our office as well to help with titration  Thank you

## 2022-09-15 NOTE — PROGRESS NOTES
[facility] Cardiology  Office Visit         Reason for Visit: Heart Failure    Primary Cardiologist: Dr. Flora Hodgson         History of Present Illness:     Ms. Jeanmarie Trejo is a 36year old female with a PMHx of recently diagnosed stress cardiomyopathy, severe diverticulitis with abscess, psoriatic arthritis, ongoing tobacco abuse. She recently has been hospitalized twice in the past several months, initially for diverticulitis with abscess that was too small to drain per IR therefore she was treated with extended IV antibiotics. Unfortunately she represented to the emergency room after completion of IV antibiotics for abdominal pain consistent with recurrent diverticulitis with ongoing abscess. She was also noted to be COVID-19 positive during second hospitalization without respiratory symptoms. She was noted to have elevated troponin with atypical chest pain prompting cardiology evaluation. During hospitalization EKG changes and wall motion of TTE concerning for ACS. Therefore she underwent LHC with no angiographic stenosis, angiogram EF 45% consistent with Takotsubo. She was initiated on GDMT. She presents today in hospital follow-up, since discharge from the hospital she has been compliant with all of her current cardiac medications. She has no current cardiac complaints. Denies any dizziness, lightheadedness, near-syncope, syncope, strokelike symptoms, chest pain, pressure, palpitations, shortness of breath, SANTANA, orthopnea, PND, abdominal bloating, early satiety or lower extremity edema. She currently is getting IV antibiotics through a PICC line given recurrent diverticulitis with abscess.       Patient Active Problem List    Diagnosis Date Noted    Severe protein-calorie malnutrition ([address] 09/06/2022     Priority: Medium    ACS (acute coronary syndrome) ([address] 09/05/2022     Priority: Medium    Diverticulitis of colon 09/04/2022     Priority: Medium    Troponin I above reference range 09/03/2022 Priority: Medium    Tobacco abuse 09/03/2022     Priority: Medium    Diverticulitis 07/20/2022     Priority: Medium    Diverticulitis of intestine with abscess 07/20/2022     Priority: Medium    Hydronephrosis 07/20/2022     Priority: Medium    Psoriatic arthritis ([address] 07/20/2022     Priority: Medium    Gastroesophageal reflux disease 07/20/2022     Priority: Medium     Past Medical and Surgical History:    Obesity   Continued tobacco abuse   Psoriatic Arthritis, on Methotrexate  GERD  S/p Cholecystectomy, hysterectomy  Diverticulitis  CT of the Abdomen 07/21/2022:  Small fluid collection in the pelvic cul-de-sac measuring 3.3 x 2.6 cm compatible with an abscess. Please note there is an adjacent inflamed small bowel loop in the pelvic cul-de-sac. Persistent wall thickening and significant inflammation surrounding the rectosigmoid colon compatible with colitis or diverticulitis. Left hydronephrosis and hydroureter likely related to the inflammatory change in the pelvis. No calculi detected-->Evaluated by General Surgery, Urology, ID, IR.  PICC placed for IV antibiotic therapy       Past Medical History:   Diagnosis Date    Arthritis     Diverticulosis     Psoriasis          Past Surgical History:   Procedure Laterality Date    ABDOMEN SURGERY      CHOLECYSTECTOMY      COLONOSCOPY      COLONOSCOPY W/ POLYPECTOMY      HYSTERECTOMY (CERVIX STATUS UNKNOWN)      LYMPHADENECTOMY Right     PICC INSERTION VASCULAR ACCESS TEAM  7/22/2022    PICC LINE INSERTION NURSE  9/8/2022             Allergies   Allergen Reactions    Bee Venom Swelling    Pcn [Penicillins] Hives, Swelling and Other (See Comments)     Elevated temp         Outpatient Medications Marked as Taking for the 9/15/22 encounter (Office Visit) with JEFF Hastings CNP   Medication Sig Dispense Refill    lisinopril (PRINIVIL;ZESTRIL) 2.5 MG tablet Take 1 tablet by mouth daily 30 tablet 3    metoprolol succinate (TOPROL XL) 25 MG extended release tablet Take 1 tablet by mouth daily 30 tablet 3    meropenem (MERREM) infusion Infuse 2,000 mg intravenously in the morning and 2,000 mg at noon and 2,000 mg in the evening. Do all this for 23 days. Compound per protocol. . 1 each 0    docusate sodium (COLACE) 100 MG capsule Take 100 mg by mouth daily      cetirizine (ZYRTEC) 10 MG tablet Take 10 mg by mouth in the morning. ondansetron (ZOFRAN) 4 MG tablet Take 4 mg by mouth every 8 hours as needed for Nausea or Vomiting      omeprazole (PRILOSEC) 20 MG delayed release capsule Take 20 mg by mouth in the morning. Review of Systems:   Cardiac: As per HPI  General: No fever, chills, rigors  Pulmonary: As per HPI  HEENT: No visual disturbances, difficult swallowing  GI: No nausea, vomiting, abdominal pain  : No dysuria or hematuria  Endocrine: No thyroid disease or diabetes  Musculoskeletal: GROVER x 4, no focal motor deficits  Skin: Intact, no rashes  Neuro/Psych: No headache or seizures          Weights: Wt Readings from Last 3 Encounters:   09/15/22 197 lb (89.4 kg)   09/09/22 209 lb 9.6 oz (95.1 kg)   08/26/22 195 lb 6.4 oz (88.6 kg)             Physical Examination:     /60   Pulse 70   Resp 16   Ht 5' 6\" (1.676 m)   Wt 197 lb (89.4 kg)   SpO2 99%   BMI 31.80 kg/m²     CONSTITUTIONAL: Alert and oriented times 3, no acute distress and cooperative to examination with proper mood and affect. SKIN: Skin color, texture, turgor normal. No rashes or lesions. LYMPH: no cervical nodes, no inguinal nodes  HEENT: Head is normocephalic, atraumatic. EOMI, PERRLA. NECK: Supple, symmetrical, trachea midline, no adenopathy, thyroid symmetric, not enlarged and no tenderness, skin normal.  CHEST/LUNGS: chest symmetric with normal A/P diameter, normal respiratory rate and rhythm, lungs clear to auscultation without wheezes, rales or rhonchi. No accessory muscle use.  Scars None   CARDIOVASCULAR: Heart sounds are normal.  Regular rate and rhythm without murmur, gallop or rub. Normal S1 and S2. . Carotid and femoral pulses 2+/4 and equal bilaterally. ABDOMEN: Normal shape. No and Laparoscopic scar(s) present. Normal bowel sounds. No bruits. soft, nondistended, no masses or organomegaly. no evidence of hernia. Percussion: Normal without hepatosplenomegally. Tenderness: absent. RECTAL: deferred, not clinically indicated  NEUROLOGIC: There are no focalizing motor or sensory deficits. CN II-XII are grossly intact. Josue Esquivel EXTREMITIES: no cyanosis, no clubbing, and no edema. All the following diagnostics were personally reviewed and interpreted by me. LAB DATA:     9/8/22 12:50 9/9/22 04:00 9/12/22 12:56   Sodium 138 139 139   Potassium 3.7 3.9 3.6   Chloride 106 107 104   CO2 24 24 24   BUN,BUNPL 4 (L) 6 7   Creatinine 0.6 0.6 0.6   Anion Gap 8 8 11   GFR Non- >60 >60 >60   GFR  >60 >60 >60   Magnesium 1.9 1.9    Glucose, Random 99 93 85   CALCIUM, SERUM, 614752 8.6 8.5 (L) 9.4   Phosphorus 2.5 2.9    Total Protein   6.7   CRP   0.6 (H)   Albumin   4.1   Alk Phos   76   ALT   20   AST   20   Bilirubin   0.2   WBC 5.1 5.3 7.0   RBC 3.69 3.48 (L) 4.11   Hemoglobin Quant 11.3 (L) 10.6 (L) 12.7   Hematocrit 34.6 33.0 (L) 39.8   MCV 93.8 94.8 96.8   MCH 30.6 30.5 30.9   MCHC 32.7 32.1 31.9 (L)   MPV 12.0 12.2 (H) 12.9 (H)   RDW 13.3 13.4 13.6   Platelet Count 686 888 142       IMAGING:    CT Abdomen (9/3/2022)  Impression  1. Ongoing more late subacute phase of severe complicated diverticulitis of  the mid distal sigmoid colon as above commented. 2.  Diminished number of a pockets of extraluminal air which indicates the  well contained perforations. The there are significant tethering of the  inflammatory process towards the root of the mesentery which also tethered is  adjacent mid small bowel segments and the left-sided the bladder and the  fundal region of the vagina.   Cannot exclude a component of a fistula between  the sigmoid colon and these structures. 3.  Entrapped mid distal 3rd of the left ureter by the inflammatory process  from the sigmoid colon causing continued obstructive uropathy in the left  kidney. 4.  Visualization of the right and left ovaries they have unremarkable  appearance otherwise the locating upper pelvic region. Patient had previous  hysterectomy. CARDIAC TESTING:    TTE (9/4/2022)   Summary   Normal left ventricular size. LV systolic function is low normal.   Ejection fraction is visually estimated at 50-55%. Normal diastolic function. Hypokinesis of the mid anteroseptal, mid inferoseptal, and apical septal walls. Mild basal septal hypertrophy. Normal right ventricular size and function. No significant valvular abnormalities. [facility] (9/7/2022)  HEMODYNAMICS:  Aortic pressure 134/86 mmHg. Left ventricular end-diastolic pressure 23 mmHg. CORONARY ANATOMY:  LEFT MAIN:  It is a short artery with no angiographic stenosis noted. LAD:  It is a large artery wrapping around the apex and giving rise to  two small diagonal branches and two fair-size septal perforators. No  angiographic stenosis noted. RAMUS BRANCH:  It is large with no angiographic stenosis noted. LEFT CIRCUMFLEX:  It is a large artery giving rise to a large obtuse  marginal branch and probably an SA tirso and AV tirso branch. No  angiographic stenosis noted. RIGHT CORONARY ARTERY:  It is a large dominant artery giving rise to two  very small right ventricular marginal branches, a small PDA and a  fair-sized PLV branch. No angiographic stenosis noted. Left ventriculogram revealed mid ventricular hypokinesis consistent with  atypical Takotsubo cardiomyopathy with an ejection fraction of 45  plus/minus 5%. No mitral inflation was noted. IMPRESSION:  1. Absence of epicardial coronary artery stenosis. 2.  Elevated LVEDP at 23 mmHg.   3.  Mid ventricular hypokinesis consistent with atypical Takotsubo  cardiomyopathy with an

## 2022-09-15 NOTE — PATIENT INSTRUCTIONS
Stop lisinopril and let this wash out of your system for 36 hours    2. On Saturday 9/17/2022 start Entresto 24/26 mg twice daily     3. Follow up labs one week after starting Entresto - currently has [facility] and getting labs twice weekly. We can use these labs for follow up    4. Continue rest of current cardiac medications - we will continue to slowly adjust your heart failure medication as tolerated     5. Follow up with Dr. Conrado Jara in 1-2 months     6. Weigh yourself daily    -Stay Hydrated, 64 oz     -Diet should sodium restricted to 2 grams    -If at any time you feel that you are retaining fluid, your medications are not working, or you feel ill in anyway, then please call us for either same day appointment or the next day, and for instructions. Our goal is to keep you out of the emergency room and the hospital and we have ways to do it. You just need to call us in a timely manner.     -If you become sick for other reasons, and notice that you are not urinating as much, the urine is very dark, you have significant diarrhea or vomiting, then please DO NOT take your water pill and CALL US immediately.

## 2022-09-16 ENCOUNTER — TELEPHONE (OUTPATIENT)
Dept: CARDIOLOGY CLINIC | Age: 42
End: 2022-09-16

## 2022-09-16 DIAGNOSIS — I51.81 TAKOTSUBO CARDIOMYOPATHY: ICD-10-CM

## 2022-09-16 DIAGNOSIS — Z79.899 NEW MEDICATION ADDED: ICD-10-CM

## 2022-09-16 DIAGNOSIS — I50.20 HFREF (HEART FAILURE WITH REDUCED EJECTION FRACTION) (HCC): ICD-10-CM

## 2022-09-16 DIAGNOSIS — I42.8 NONISCHEMIC CARDIOMYOPATHY (HCC): Primary | ICD-10-CM

## 2022-09-16 NOTE — TELEPHONE ENCOUNTER
Left VM on nurse line at [facility], request for faxing labs [facility] does to [phone]. Also include vitals, weight if have them  Let office know what labs you are drawing?    Office # provided

## 2022-09-16 NOTE — TELEPHONE ENCOUNTER
Called [facility] to review entresto needs and cost.   Marivel  needs PA. I just sent electronic PA request for [facility] community plan  (Since 2022 entresto no longer enrolled in cover my meds, making a hardship to distinguish who needs PA and who doesm't. As it was auto generated to cover my meds up till 2022 if one was needed.)   View all authorizations for this medication   Closed  9/15/2022 11:22 AM  Case ID: changepayer Close reason: Prior Authorization not required for patient/medication   Note from payer: CoverMyMeds has predicted that this prior auth will not be required.    Payer:  CoverMyMeds Generic Payer    [phone]       Waiting for Payer Response  9/15/2022 11:22 AM  Sending user: Gay Goldmann, APRN - CNP   Payer:  CoverMyMeds Generic Payer    [phone]           Pending PA approval.

## 2022-09-22 ENCOUNTER — TELEPHONE (OUTPATIENT)
Dept: SURGERY | Age: 42
End: 2022-09-22

## 2022-09-22 NOTE — TELEPHONE ENCOUNTER
Halley Zhao from [facility] called and stated that patient is having nausea when she has to have a bowel movement and her bowel movements are sticky. MA asked Halley Zhao if patient had anything for nausea and she was going to check with her. Halley Zhao called back and stated patient did have Zofran which patient will use and she will call back if need be.   Electronically signed by Lakhwinder Manzano MA on 9/22/2022 at 5:08 PM

## 2022-09-29 NOTE — TELEPHONE ENCOUNTER
Community plan [facility]. Priyanka Calvillo Lat 1980       Left VM at [facility] aid [phone]  Jenn Chand is completed. Please call cardiology office if too expensive. [phone] option 2     Called patient mailbox is full . Unable to leave  [phone] (home)  . Mailed letter regarding Heather Vines Called [facility] [phone]  . Spoke with [employer]. She will have nurse going to [employer] home today, educate [employer] that SANDOW for Praneeth Camarillo is at [facility] aid.      Yari Israel RN

## 2022-09-30 ENCOUNTER — HOSPITAL ENCOUNTER (OUTPATIENT)
Dept: CT IMAGING | Age: 42
Discharge: HOME | End: 2022-10-02
Payer: MEDICAID

## 2022-09-30 DIAGNOSIS — K57.32 DIVERTICULITIS OF COLON: ICD-10-CM

## 2022-09-30 PROCEDURE — 74176 CT ABD & PELVIS W/O CONTRAST: CPT

## 2022-10-28 ENCOUNTER — OFFICE VISIT (OUTPATIENT)
Dept: SURGERY | Age: 42
End: 2022-10-28
Payer: MEDICAID

## 2022-10-28 VITALS
DIASTOLIC BLOOD PRESSURE: 70 MMHG | WEIGHT: 197 LBS | SYSTOLIC BLOOD PRESSURE: 114 MMHG | HEIGHT: 66 IN | RESPIRATION RATE: 18 BRPM | BODY MASS INDEX: 31.66 KG/M2 | OXYGEN SATURATION: 98 % | TEMPERATURE: 97.3 F | HEART RATE: 57 BPM

## 2022-10-28 DIAGNOSIS — K57.92 DIVERTICULITIS: Primary | ICD-10-CM

## 2022-10-28 PROCEDURE — 99214 OFFICE O/P EST MOD 30 MIN: CPT | Performed by: SURGERY

## 2022-10-28 PROCEDURE — G8484 FLU IMMUNIZE NO ADMIN: HCPCS | Performed by: SURGERY

## 2022-10-28 PROCEDURE — 4004F PT TOBACCO SCREEN RCVD TLK: CPT | Performed by: SURGERY

## 2022-10-28 PROCEDURE — G8417 CALC BMI ABV UP PARAM F/U: HCPCS | Performed by: SURGERY

## 2022-10-28 PROCEDURE — G8427 DOCREV CUR MEDS BY ELIG CLIN: HCPCS | Performed by: SURGERY

## 2022-10-28 NOTE — PROGRESS NOTES
[facility] Note    Assessment/Plan:     Diagnosis Orders   1. Diverticulitis      Symptoms improving. Most recent imaging improved. We will plan for resection          Return for Surgery. Chief Complaint   Patient presents with    Follow-up     3 month follow up        PCP: Baljinder Hanson MD    HPI: Bebe Walters is a 36 y.o. female here for diverticulitis follow-up. Overall she is doing better. She is still on IV Merrem. Pain is better overall but still has a very mild discomfort in the left lower quadrant. She also has some epigastric discomfort that is not food related. Her bowels are moving okay. She does have occasional constipation. There is no blood in the stools. Her most recent CT was reviewed which shows improving inflammatory response in the left lower quadrant. Left hydronephrosis is also improved significantly. Discussed case with Dr. Marlene Abbott regarding antibiotic and surgical plans. Review of Systems   All other systems reviewed and are negative.       Past Medical History:   Diagnosis Date    Arthritis     Diverticulosis     Psoriasis        Past Surgical History:   Procedure Laterality Date    ABDOMEN SURGERY      CHOLECYSTECTOMY      COLONOSCOPY      COLONOSCOPY W/ POLYPECTOMY      HYSTERECTOMY (CERVIX STATUS UNKNOWN)      LYMPHADENECTOMY Right     PICC INSERTION VASCULAR ACCESS TEAM  7/22/2022    PICC LINE INSERTION NURSE  9/8/2022       Family History   Problem Relation Age of Onset    High Blood Pressure Mother     Lung Cancer Father        Social History     Socioeconomic History    Marital status:      Spouse name: Not on file    Number of children: Not on file    Years of education: Not on file    Highest education level: Not on file   Occupational History    Not on file   Tobacco Use    Smoking status: Every Day     Packs/day: 0.50     Years: 26.00     Pack years: 13.00     Types: Cigarettes    Smokeless tobacco: Never   Vaping Use    Vaping Use: Never used   Substance and Sexual Activity    Alcohol use: Never    Drug use: Never    Sexual activity: Yes     Partners: Male   Other Topics Concern    Not on file   Social History Narrative    Not on file     Social Determinants of Health     Financial Resource Strain: Not on file   Food Insecurity: Not on file   Transportation Needs: Not on file   Physical Activity: Not on file   Stress: Not on file   Social Connections: Not on file   Intimate Partner Violence: Not on file   Housing Stability: Not on file       Allergies   Allergen Reactions    Bee Venom Swelling    Pcn [Penicillins] Hives, Swelling and Other (See Comments)     Elevated temp         Current Outpatient Medications   Medication Sig Dispense Refill    sacubitril-valsartan (ENTRESTO) 24-26 MG per tablet Take 1 tablet by mouth 2 times daily 60 tablet 3    metoprolol succinate (TOPROL XL) 25 MG extended release tablet Take 1 tablet by mouth daily 30 tablet 3    docusate sodium (COLACE) 100 MG capsule Take 100 mg by mouth daily      cetirizine (ZYRTEC) 10 MG tablet Take 10 mg by mouth in the morning. ondansetron (ZOFRAN) 4 MG tablet Take 4 mg by mouth every 8 hours as needed for Nausea or Vomiting      omeprazole (PRILOSEC) 20 MG delayed release capsule Take 20 mg by mouth in the morning. fluconazole (DIFLUCAN) 150 MG tablet Take 1 tablet by mouth once for 1 dose As needed 1 tablet 5     No current facility-administered medications for this visit. The remainder of the past medical, past surgical, family, and psychosocial history, as well as medication and allergy review, were completed and are as documented elsewhere in the chart. Objective:  Vitals:    10/28/22 1016   BP: 114/70   Pulse: 57   Resp: 18   Temp: 97.3 °F (36.3 °C)   TempSrc: Temporal   SpO2: 98%   Weight: 197 lb (89.4 kg)   Height: 5' 6\" (1.676 m)          Physical Exam  Constitutional:       General: She is not in acute distress.      Appearance: She is not diaphoretic. Cardiovascular:      Rate and Rhythm: Normal rate. Pulmonary:      Effort: Pulmonary effort is normal. No respiratory distress. Abdominal:      General: There is no distension. Palpations: Abdomen is soft. Tenderness: There is no guarding or rebound. Sheryl Ortiz MD      I have examined the patient and performed the key aspects of physical exam, reviewed the record (including all pertinent and new radiology images and laboratory findings), and discussed the case with the primary and referring provider where applicable. NOTE: This report, in part or full, may have been transcribed using voice recognition software. Every effort was made to ensure accuracy; however, inadvertent computerized transcription errors may be present. Please excuse any transcriptional grammatical or spelling errors that may have escaped my editorial review.       CC: Genesis Skinner MD, Dr. Aracelis Fine

## 2022-10-31 ENCOUNTER — TELEPHONE (OUTPATIENT)
Dept: SURGERY | Age: 42
End: 2022-10-31

## 2022-10-31 NOTE — TELEPHONE ENCOUNTER
Geo Mata is scheduled for laparoscopic robotic assisted sigmoid colectomy with stents and tap blocks (spoke with [facility] and Dr Car Kincaid Les will do stents) with Dr Nevin Martines on 12-14-22 at [facility] at 12:30 pm. Patient was told to arrive at 10:30 am. Patient needs to be NPO after midnight the night before procedure. All surgery instructions were explained to the patient and a surgery letter was also mailed out. MA informed patient that PAT will also be calling to review pre-op instructions and medications. Patient verbalized understanding.   Electronically signed by Shane Lomax MA on 10/31/2022 at 7:59 AM

## 2022-10-31 NOTE — TELEPHONE ENCOUNTER
Prior Authorization Form:      DEMOGRAPHICS:                     Patient Name:  Keisha Young  Patient :  1980            Insurance:  Payor: [facility] / Plan: Chad Avelar / Product Type: *No Product type* /   Insurance ID Number:    Payer/Plan Subscr  Sex Relation Sub.  Ins. ID Effective Group Num   1. [address] [facility] 1980 Female Self [id number]                                   [address]         DIAGNOSIS & PROCEDURE:                       Procedure/Operation: Laparoscopic robotic assisted sigmoid colectomy with stents and tap blocks           CPT Code: 58648    Diagnosis:  Diverticulitis of intestine with abscess    ICD10 Code: K57.80    Location:  [facility]    Surgeon:  Dr Gretel Hernández INFORMATION:                          Date: 22    Time: 12:30 pm              Anesthesia:  General                                                       Status:  Inpatient        Special Comments:  AM admit       Electronically signed by Veronika Goodson MA on 10/31/2022 at 8:00 AM

## 2022-11-02 ENCOUNTER — TELEPHONE (OUTPATIENT)
Dept: SURGERY | Age: 42
End: 2022-11-02

## 2022-11-02 NOTE — TELEPHONE ENCOUNTER
Prior Authorization Form:      DEMOGRAPHICS:                     Patient Name:  Azul Stone  Patient :  1980            Insurance:  Payor: [facility] / Plan: [employer] / Product Type: *No Product type* /   Insurance ID Number:    Payer/Plan Subscr  Sex Relation Sub.  Ins. ID Effective Group Num   1. [address] [facility] 1980 Female Self [id number]                                   [address]         DIAGNOSIS & PROCEDURE:                       Procedure/Operation: Laparoscopic robotic assisted sigmoid colectomy with stents and tap blocks           CPT Code: 95758    Diagnosis:  Diverticulitis of intestine with abscess    ICD10 Code: K57.80    Location:  [facility]    Surgeon:  Dr Srikanth Anderson INFORMATION:                          Date: 22    Time: 9:00 am              Anesthesia:  General                                                       Status:  Inpatient        Special Comments:  hiram Thompson from 22       Electronically signed by Demarco Tinajreo MA on 2022 at 10:49 AM

## 2022-11-02 NOTE — TELEPHONE ENCOUNTER
MA informed patient that her surgery (laparoscopic robotic assisted sigmoid colectomy with stents and tap blocks) was rescheduled to 12-28-22 at [facility] at 9:00 am, arrive at 7:00 am due to time restraints on 12-14-22. MA spoke with Jasmin at [facility] and Dr No Elliott will do stents that day. MA mailed out new surgery and follow up paperwork. [facility] will need [facility] for colostomy.     Electronically signed by Brigitte Irvin MA on 11/2/2022 at 10:39 AM Yes

## 2022-11-07 ENCOUNTER — APPOINTMENT (OUTPATIENT)
Dept: GENERAL RADIOLOGY | Age: 42
End: 2022-11-07
Payer: MEDICAID

## 2022-11-07 LAB
ALBUMIN SERPL-MCNC: 4 G/DL (ref 3.5–5.2)
ALP BLD-CCNC: 89 U/L (ref 35–104)
ALT SERPL-CCNC: 26 U/L (ref 0–32)
ANION GAP SERPL CALCULATED.3IONS-SCNC: 6 MMOL/L (ref 7–16)
APTT: 142.8 SEC (ref 24.5–35.1)
AST SERPL-CCNC: 25 U/L (ref 0–31)
BASOPHILS ABSOLUTE: 0.03 E9/L (ref 0–0.2)
BASOPHILS RELATIVE PERCENT: 0.3 % (ref 0–2)
BILIRUB SERPL-MCNC: 0.3 MG/DL (ref 0–1.2)
BUN BLDV-MCNC: 6 MG/DL (ref 6–20)
CALCIUM SERPL-MCNC: 9.1 MG/DL (ref 8.6–10.2)
CHLORIDE BLD-SCNC: 103 MMOL/L (ref 98–107)
CO2: 25 MMOL/L (ref 22–29)
CREAT SERPL-MCNC: 0.7 MG/DL (ref 0.5–1)
EOSINOPHILS ABSOLUTE: 0.03 E9/L (ref 0.05–0.5)
EOSINOPHILS RELATIVE PERCENT: 0.3 % (ref 0–6)
GFR SERPL CREATININE-BSD FRML MDRD: >60 ML/MIN/1.73
GLUCOSE BLD-MCNC: 102 MG/DL (ref 74–99)
HCT VFR BLD CALC: 41.6 % (ref 34–48)
HEMOGLOBIN: 14.2 G/DL (ref 11.5–15.5)
IMMATURE GRANULOCYTES #: 0.02 E9/L
IMMATURE GRANULOCYTES %: 0.2 % (ref 0–5)
INR BLD: 1.1
LACTIC ACID, SEPSIS: 1.3 MMOL/L (ref 0.5–1.9)
LIPASE: 16 U/L (ref 13–60)
LYMPHOCYTES ABSOLUTE: 1.49 E9/L (ref 1.5–4)
LYMPHOCYTES RELATIVE PERCENT: 16.6 % (ref 20–42)
MCH RBC QN AUTO: 31.1 PG (ref 26–35)
MCHC RBC AUTO-ENTMCNC: 34.1 % (ref 32–34.5)
MCV RBC AUTO: 91.2 FL (ref 80–99.9)
MONOCYTES ABSOLUTE: 0.51 E9/L (ref 0.1–0.95)
MONOCYTES RELATIVE PERCENT: 5.7 % (ref 2–12)
NEUTROPHILS ABSOLUTE: 6.92 E9/L (ref 1.8–7.3)
NEUTROPHILS RELATIVE PERCENT: 76.9 % (ref 43–80)
PDW BLD-RTO: 12.7 FL (ref 11.5–15)
PLATELET # BLD: 178 E9/L (ref 130–450)
PMV BLD AUTO: 11.2 FL (ref 7–12)
POTASSIUM REFLEX MAGNESIUM: 4.1 MMOL/L (ref 3.5–5)
PROTHROMBIN TIME: 12 SEC (ref 9.3–12.4)
RBC # BLD: 4.56 E12/L (ref 3.5–5.5)
RBC # BLD: NORMAL 10*6/UL
SODIUM BLD-SCNC: 134 MMOL/L (ref 132–146)
TOTAL PROTEIN: 6.5 G/DL (ref 6.4–8.3)
WBC # BLD: 9 E9/L (ref 4.5–11.5)

## 2022-11-07 PROCEDURE — 85025 COMPLETE CBC W/AUTO DIFF WBC: CPT

## 2022-11-07 PROCEDURE — 85730 THROMBOPLASTIN TIME PARTIAL: CPT

## 2022-11-07 PROCEDURE — 99284 EMERGENCY DEPT VISIT MOD MDM: CPT

## 2022-11-07 PROCEDURE — 83605 ASSAY OF LACTIC ACID: CPT

## 2022-11-07 PROCEDURE — 85610 PROTHROMBIN TIME: CPT

## 2022-11-07 PROCEDURE — 83690 ASSAY OF LIPASE: CPT

## 2022-11-07 PROCEDURE — 71045 X-RAY EXAM CHEST 1 VIEW: CPT

## 2022-11-07 PROCEDURE — 80053 COMPREHEN METABOLIC PANEL: CPT

## 2022-11-07 ASSESSMENT — PAIN SCALES - GENERAL: PAINLEVEL_OUTOF10: 4

## 2022-11-08 ENCOUNTER — HOSPITAL ENCOUNTER (EMERGENCY)
Age: 42
Discharge: LEFT BEFORE DISCHARGE | End: 2022-11-08
Payer: MEDICAID

## 2022-11-08 VITALS
DIASTOLIC BLOOD PRESSURE: 57 MMHG | RESPIRATION RATE: 18 BRPM | HEART RATE: 70 BPM | WEIGHT: 208 LBS | OXYGEN SATURATION: 98 % | SYSTOLIC BLOOD PRESSURE: 98 MMHG | TEMPERATURE: 97.7 F | BODY MASS INDEX: 33.43 KG/M2 | HEIGHT: 66 IN

## 2022-11-08 NOTE — ED NOTES
Patient did not want to wait any longer and refused to sign an ed withdraw form.      Lucero Held  11/08/22 [id number]

## 2022-11-08 NOTE — ED NOTES
The following labs were labeled with appropriate pt sticker and tubed to lab:     [x] Blue     [x] Lavender   [] on ice  [x] Green/yellow  [] Green/black [] on ice  [x] Grey  [] on ice  [] Yellow  [] Red  [] Type/ Screen  [] ABG  [] VBG    [] COVID-19 swab    [] Rapid  [] PCR  [] Flu swab  [] Peds Viral Panel     [] Urine Sample  [] Pelvic Cultures  [] Blood Cultures  [] X 2  [] STREP Cultures         Beverly James RN  11/07/22 2027

## 2022-11-08 NOTE — ED NOTES
Department of Emergency Medicine  FIRST PROVIDER TRIAGE NOTE             Independent MLP           11/7/22  8:17 PM EST    Date of Encounter: 11/7/22   MRN: [id number]      HPI: Rose Yeh is a 36 y.o. female who presents to the ED for Fever (fever, on merropenem, has recurrent abdominal abscess)   PT presents to ED with 2 fevers today at home 100.7 then jumped to 102.2. She has PICC for meropenum treating persistent abdominal abscess x 8-9 weeks. She follows with [facility] for ID, Stewart Chow for surgery. PT reports fever resolved on its own. She just had PICC dressing changed today. ROS: Negative for cp, sob, cough, vomiting, or diarrhea. PE: Gen Appearance/Constitutional: alert  CV: regular rate  Pulm: CTA bilat  Musculoskeletal: PICC area soft, non tender, no erythema. Initial Plan of Care: All treatment areas with department are currently occupied. Plan to order/Initiate the following while awaiting opening in ED: labs and imaging studies.   Initiate Treatment-Testing, Proceed toTreatment Area When Bed Available for ED Attending/MLP to Continue Care    Electronically signed by Cory Farley PA-C   DD: 11/7/22       Cory Farley PA-C  11/07/22 2021

## 2022-11-09 ENCOUNTER — HOSPITAL ENCOUNTER (OUTPATIENT)
Dept: CT IMAGING | Age: 42
Discharge: HOME | End: 2022-11-11
Payer: MEDICAID

## 2022-11-09 ENCOUNTER — TELEPHONE (OUTPATIENT)
Dept: INFECTIOUS DISEASES | Age: 42
End: 2022-11-09

## 2022-11-09 DIAGNOSIS — K57.92 DIVERTICULITIS: ICD-10-CM

## 2022-11-09 PROCEDURE — 74176 CT ABD & PELVIS W/O CONTRAST: CPT

## 2022-11-09 NOTE — TELEPHONE ENCOUNTER
Reviewed CT A/p from today and also the chart. Discussed with the patient. She had fever with chills yesterday and went to ER , had to wait 12 hours and was not seen by physician so she left. She is not doing well. Has nausea. I have informed her that CT A/p does not look any better and still has unhealed perforation and possible abscess. She is already on meropenem. I donot have much to add. I think she is a candidate for surgery. I have discussed this with Dr. Chet Perez but there are no OR spots to move her surgery. So I have recommended the patient to go to [facility] in [place]. She said she will go tonight or tomorrow.      Naren ALBERTO

## 2022-11-14 ENCOUNTER — TELEPHONE (OUTPATIENT)
Dept: SURGERY | Age: 42
End: 2022-11-14

## 2022-11-14 LAB
ALBUMIN SERPL-MCNC: 3.6 G/DL (ref 3.5–5.2)
ALP BLD-CCNC: 79 U/L (ref 35–104)
ALT SERPL-CCNC: 22 U/L (ref 0–32)
ANION GAP SERPL CALCULATED.3IONS-SCNC: 13 MMOL/L (ref 7–16)
AST SERPL-CCNC: 24 U/L (ref 0–31)
BILIRUB SERPL-MCNC: <0.2 MG/DL (ref 0–1.2)
BUN BLDV-MCNC: 6 MG/DL (ref 6–20)
C-REACTIVE PROTEIN: 0.4 MG/DL (ref 0–0.4)
CALCIUM SERPL-MCNC: 8.9 MG/DL (ref 8.6–10.2)
CHLORIDE BLD-SCNC: 106 MMOL/L (ref 98–107)
CO2: 22 MMOL/L (ref 22–29)
CREAT SERPL-MCNC: 0.6 MG/DL (ref 0.5–1)
GFR SERPL CREATININE-BSD FRML MDRD: >60 ML/MIN/1.73
GLUCOSE BLD-MCNC: 71 MG/DL (ref 74–99)
POTASSIUM SERPL-SCNC: 4 MMOL/L (ref 3.5–5)
SODIUM BLD-SCNC: 141 MMOL/L (ref 132–146)
TOTAL PROTEIN: 6.1 G/DL (ref 6.4–8.3)

## 2022-11-14 NOTE — TELEPHONE ENCOUNTER
MA phoned patient to see if she was getting her surgery done in [facility] [place]. Patient stated she was having surgery on 11-16-22.     Electronically signed by Mel Holland MA on 11/14/2022 at 10:15 AM

## 2022-11-15 LAB
ANISOCYTOSIS: ABNORMAL
BASOPHILS ABSOLUTE: 0 E9/L (ref 0–0.2)
BASOPHILS RELATIVE PERCENT: 0.9 % (ref 0–2)
BURR CELLS: ABNORMAL
EOSINOPHILS ABSOLUTE: 0.23 E9/L (ref 0.05–0.5)
EOSINOPHILS RELATIVE PERCENT: 4.3 % (ref 0–6)
HCT VFR BLD CALC: 41.3 % (ref 34–48)
HEMOGLOBIN: 13.7 G/DL (ref 11.5–15.5)
LYMPHOCYTES ABSOLUTE: 1.57 E9/L (ref 1.5–4)
LYMPHOCYTES RELATIVE PERCENT: 28.7 % (ref 20–42)
MCH RBC QN AUTO: 30.9 PG (ref 26–35)
MCHC RBC AUTO-ENTMCNC: 33.2 % (ref 32–34.5)
MCV RBC AUTO: 93 FL (ref 80–99.9)
METAMYELOCYTES RELATIVE PERCENT: 0.9 % (ref 0–1)
MONOCYTES ABSOLUTE: 0.54 E9/L (ref 0.1–0.95)
MONOCYTES RELATIVE PERCENT: 9.6 % (ref 2–12)
NEUTROPHILS ABSOLUTE: 3.08 E9/L (ref 1.8–7.3)
NEUTROPHILS RELATIVE PERCENT: 56.5 % (ref 43–80)
OVALOCYTES: ABNORMAL
PDW BLD-RTO: 12.9 FL (ref 11.5–15)
PLATELET # BLD: 163 E9/L (ref 130–450)
PMV BLD AUTO: 12.5 FL (ref 7–12)
POIKILOCYTES: ABNORMAL
RBC # BLD: 4.44 E12/L (ref 3.5–5.5)
SEDIMENTATION RATE, ERYTHROCYTE: 1 MM/HR (ref 0–20)
TEAR DROP CELLS: ABNORMAL
WBC # BLD: 5.4 E9/L (ref 4.5–11.5)

## 2022-11-15 NOTE — RESULT ENCOUNTER NOTE
Labs reviewed    Current GDMT:  Entresto 24/26 mg BID  Toprol 25 mg daily     Please have her start Jardiance vs Farxiga 10 mg daily     Follow up labs in 2 weeks (please make sure these get sent to our office, past labs except this set have not been sent delaying titration)    Thank you

## 2022-11-16 ENCOUNTER — TELEPHONE (OUTPATIENT)
Dept: CARDIOLOGY CLINIC | Age: 42
End: 2022-11-16

## 2022-11-16 DIAGNOSIS — I50.20 HFREF (HEART FAILURE WITH REDUCED EJECTION FRACTION) (HCC): Primary | ICD-10-CM

## 2022-11-16 DIAGNOSIS — Z79.899 NEW MEDICATION ADDED: ICD-10-CM

## 2022-11-16 NOTE — TELEPHONE ENCOUNTER
----- Message from JEFF Cortez - CNP sent at 11/15/2022  4:17 PM EST -----  Labs reviewed    Current GDMT:  Entresto 24/26 mg BID  Toprol 25 mg daily     Please have her start Jardiance vs Farxiga 10 mg daily     Follow up labs in 2 weeks (please make sure these get sent to our office, past labs except this set have not been sent delaying titration)    Thank you

## 2022-11-16 NOTE — TELEPHONE ENCOUNTER
Left provider instructions in voice message. Requested office call back to confirm understanding.      Also mailed lab scripts and letter with Cypress chacorta

## 2022-11-17 ENCOUNTER — TELEPHONE (OUTPATIENT)
Dept: CARDIOLOGY CLINIC | Age: 42
End: 2022-11-17

## 2022-11-17 NOTE — TELEPHONE ENCOUNTER
Patient returned called to verify she received message to start Farxiga 10 mg. She is at [facility] had surgery. Nely confirmed she will  medication and have labs completed 2 weeks after she starts Zettie Like.

## 2022-11-25 ENCOUNTER — HOSPITAL ENCOUNTER (EMERGENCY)
Dept: HOSPITAL 83 - ED | Age: 42
LOS: 1 days | Discharge: ANOTHER HOSPITAL | End: 2022-11-26
Payer: COMMERCIAL

## 2022-11-25 VITALS — BODY MASS INDEX: 41.14 KG/M2 | WEIGHT: 256 LBS | HEIGHT: 65.98 IN

## 2022-11-25 DIAGNOSIS — Z79.899: ICD-10-CM

## 2022-11-25 DIAGNOSIS — Z90.49: ICD-10-CM

## 2022-11-25 DIAGNOSIS — Z91.030: ICD-10-CM

## 2022-11-25 DIAGNOSIS — T81.43XA: Primary | ICD-10-CM

## 2022-11-25 DIAGNOSIS — Z88.0: ICD-10-CM

## 2022-11-25 DIAGNOSIS — Z90.710: ICD-10-CM

## 2022-11-25 DIAGNOSIS — Z20.822: ICD-10-CM

## 2022-11-25 DIAGNOSIS — Z98.51: ICD-10-CM

## 2022-11-25 DIAGNOSIS — F17.200: ICD-10-CM
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
## 2022-11-25 LAB
ALP SERPL-CCNC: 152 U/L (ref 45–117)
ALT SERPL W P-5'-P-CCNC: 43 U/L (ref 12–78)
BACTERIA #/AREA URNS HPF: (no result) /[HPF]
BASOPHILS # BLD AUTO: 0 10*3/UL (ref 0–0.1)
BASOPHILS NFR BLD AUTO: 0.2 % (ref 0–1)
BUN SERPL-MCNC: 8 MG/DL (ref 7–24)
CHLORIDE SERPL-SCNC: 104 MMOL/L (ref 98–107)
CREAT SERPL-MCNC: 0.67 MG/DL (ref 0.55–1.02)
EOSINOPHIL # BLD AUTO: 0 10*3/UL (ref 0–0.4)
EOSINOPHIL # BLD AUTO: 0.2 % (ref 1–4)
ERYTHROCYTE [DISTWIDTH] IN BLOOD BY AUTOMATED COUNT: 13 % (ref 0–14.5)
GLUCOSE UR QL: (no result)
HCT VFR BLD AUTO: 35.8 % (ref 37–47)
HGB UR QL STRIP: (no result)
KETONES UR QL STRIP: (no result)
LYMPHOCYTES # BLD AUTO: 0.9 10*3/UL (ref 1.3–4.4)
LYMPHOCYTES NFR BLD AUTO: 7.1 % (ref 27–41)
MCH RBC QN AUTO: 31.2 PG (ref 27–31)
MCHC RBC AUTO-ENTMCNC: 34.6 G/DL (ref 33–37)
MCV RBC AUTO: 90.2 FL (ref 81–99)
MONOCYTES # BLD AUTO: 0.9 10*3/UL (ref 0.1–1)
MONOCYTES NFR BLD MANUAL: 7 % (ref 3–9)
NEUT #: 10.3 10*3/UL (ref 2.3–7.9)
NEUT %: 85.2 % (ref 47–73)
NRBC BLD QL AUTO: 0 % (ref 0–0)
PH UR STRIP: 7 [PH] (ref 4.5–8)
PLATELET # BLD AUTO: 248 10*3/UL (ref 130–400)
PMV BLD AUTO: 9.8 FL (ref 9.6–12.3)
POTASSIUM SERPL-SCNC: 3.7 MMOL/L (ref 3.5–5.1)
PROT SERPL-MCNC: 6.5 GM/DL (ref 6.4–8.2)
RBC # BLD AUTO: 3.97 10*6/UL (ref 4.1–5.1)
RBC #/AREA URNS HPF: (no result) RBC/HPF (ref 0–2)
SODIUM SERPL-SCNC: 135 MMOL/L (ref 136–145)
SP GR UR: 1.01 (ref 1–1.03)
UROBILINOGEN UR STRIP-MCNC: 1 E.U./DL (ref 0–1)
WBC #/AREA URNS HPF: (no result) WBC/HPF (ref 0–5)
WBC NRBC COR # BLD AUTO: 12.1 10*3/UL (ref 4.8–10.8)

## 2022-12-14 ENCOUNTER — TELEPHONE (OUTPATIENT)
Dept: CARDIOLOGY CLINIC | Age: 42
End: 2022-12-14

## 2022-12-14 NOTE — TELEPHONE ENCOUNTER
Patient states that with taking metoprolol and Entresto together, she feels foggy and sleepy. She states that she stopped the Entresto 3 days ago and feels relatively well. With the metoprolol only, she states that her BP runs in 93/72 range. She states she was prescribed Entresto for a cardiomyopathy that developed from her diverticulitis (?). Please advise.

## 2023-01-16 ENCOUNTER — OFFICE VISIT (OUTPATIENT)
Dept: CARDIOLOGY CLINIC | Age: 43
End: 2023-01-16
Payer: MEDICAID

## 2023-01-16 VITALS
SYSTOLIC BLOOD PRESSURE: 98 MMHG | WEIGHT: 193 LBS | BODY MASS INDEX: 31.02 KG/M2 | DIASTOLIC BLOOD PRESSURE: 66 MMHG | RESPIRATION RATE: 12 BRPM | HEIGHT: 66 IN | HEART RATE: 67 BPM

## 2023-01-16 DIAGNOSIS — I51.81 TAKOTSUBO CARDIOMYOPATHY: Primary | ICD-10-CM

## 2023-01-16 DIAGNOSIS — I50.42 CHRONIC COMBINED SYSTOLIC AND DIASTOLIC CONGESTIVE HEART FAILURE (HCC): ICD-10-CM

## 2023-01-16 DIAGNOSIS — Z72.0 TOBACCO ABUSE: ICD-10-CM

## 2023-01-16 PROCEDURE — G8417 CALC BMI ABV UP PARAM F/U: HCPCS | Performed by: INTERNAL MEDICINE

## 2023-01-16 PROCEDURE — 99214 OFFICE O/P EST MOD 30 MIN: CPT | Performed by: INTERNAL MEDICINE

## 2023-01-16 PROCEDURE — G8427 DOCREV CUR MEDS BY ELIG CLIN: HCPCS | Performed by: INTERNAL MEDICINE

## 2023-01-16 PROCEDURE — G8484 FLU IMMUNIZE NO ADMIN: HCPCS | Performed by: INTERNAL MEDICINE

## 2023-01-16 PROCEDURE — 93000 ELECTROCARDIOGRAM COMPLETE: CPT | Performed by: INTERNAL MEDICINE

## 2023-01-16 PROCEDURE — 4004F PT TOBACCO SCREEN RCVD TLK: CPT | Performed by: INTERNAL MEDICINE

## 2023-01-16 RX ORDER — LOPERAMIDE HYDROCHLORIDE 2 MG/1
2 CAPSULE ORAL 2 TIMES DAILY
COMMUNITY

## 2023-01-16 RX ORDER — OXYCODONE HYDROCHLORIDE 5 MG/1
5 TABLET ORAL PRN
COMMUNITY

## 2023-01-16 RX ORDER — ACETAMINOPHEN 500 MG
500 TABLET ORAL EVERY 6 HOURS PRN
COMMUNITY

## 2023-01-16 NOTE — PROGRESS NOTES
OUTPATIENT CARDIOLOGY FOLLOW-UP    Name: Cassi Rudolph    Age: 36 y.o. Primary Care Physician: Ashton Callow, MD    Date of Service: 1/16/2023    Chief Complaint:   Chief Complaint   Patient presents with    Cardiomyopathy        Interim History:   Hospital follow-up. Had elevated troponin in the setting of complicated diverticulitis, with wall motion abnormalities and EKG changes ultimately underwent coronary angiogram which showed no obstructive CAD and she was suspected of having had Takotsubo. Since her hospitalization, underwent 2 surgeries up in [facility] for colectomy and ileostomy. Spent about a month in the hospital.  Seems to be recovering. Hoping to have ileostomy reversed. Denies chest pain or shortness of breath, gets occasional left upper quadrant burning pain. Had low blood pressure and did not tolerate sacubitril/valsartan so that has been discontinued. Still smoking cigarettes. Review of Systems:   Negative except as described above    Past Medical History:  Past Medical History:   Diagnosis Date    Arthritis     Diverticulosis     Psoriasis        Past Surgical History:  Past Surgical History:   Procedure Laterality Date    ABDOMEN SURGERY      CHOLECYSTECTOMY      COLONOSCOPY      COLONOSCOPY W/ POLYPECTOMY      HYSTERECTOMY (CERVIX STATUS UNKNOWN)      LYMPHADENECTOMY Right     PICC INSERTION VASCULAR ACCESS TEAM  7/22/2022    PICC LINE INSERTION NURSE  9/8/2022       Family History:  Family History   Problem Relation Age of Onset    High Blood Pressure Mother     Lung Cancer Father        Social History:  Social History     Tobacco Use    Smoking status: Every Day     Packs/day: 0.50     Years: 26.00     Pack years: 13.00     Types: Cigarettes    Smokeless tobacco: Never   Vaping Use    Vaping Use: Never used   Substance Use Topics    Alcohol use: Never    Drug use: Never        Allergies:   Allergies   Allergen Reactions    Bee Venom Swelling    Pcn [Penicillins] Hives, Swelling and Other (See Comments)     Elevated temp       Current Medications:    Current Outpatient Medications:     loperamide (IMODIUM) 2 MG capsule, Take 2 mg by mouth in the morning and at bedtime, Disp: , Rfl:     acetaminophen (TYLENOL) 500 MG tablet, Take 500 mg by mouth every 6 hours as needed for Pain, Disp: , Rfl:     oxyCODONE (ROXICODONE) 5 MG immediate release tablet, Take 5 mg by mouth as needed for Pain., Disp: , Rfl:     empagliflozin (JARDIANCE) 10 MG tablet, Take 1 tablet by mouth daily, Disp: 30 tablet, Rfl: 5    metoprolol succinate (TOPROL XL) 25 MG extended release tablet, Take 1 tablet by mouth daily, Disp: 30 tablet, Rfl: 3    cetirizine (ZYRTEC) 10 MG tablet, Take 10 mg by mouth in the morning., Disp: , Rfl:     ondansetron (ZOFRAN) 4 MG tablet, Take 4 mg by mouth every 8 hours as needed for Nausea or Vomiting, Disp: , Rfl:     omeprazole (PRILOSEC) 20 MG delayed release capsule, Take 20 mg by mouth in the morning., Disp: , Rfl:     docusate sodium (COLACE) 100 MG capsule, Take 100 mg by mouth daily (Patient not taking: Reported on 1/16/2023), Disp: , Rfl:     Physical Exam:  BP 98/66   Pulse 67   Resp 12   Ht 5' 6\" (1.676 m)   Wt 193 lb (87.5 kg)   BMI 31.15 kg/m²   Wt Readings from Last 3 Encounters:   01/16/23 193 lb (87.5 kg)   11/07/22 208 lb (94.3 kg)   10/28/22 197 lb (89.4 kg)     Appearance: Awake, alert and oriented x 3, no acute respiratory distress  Skin: Intact, no rash  Head: Normocephalic, atraumatic  Eyes: EOMI, no conjunctival erythema  ENMT: No pharyngeal erythema, MMM, no rhinorrhea  Neck: Supple, no elevated JVP, no carotid bruits  Lungs: Clear to auscultation bilaterally. No wheezes, rales, or rhonchi. Cardiac: Regular rate and rhythm, +S1S2, no murmurs apparent  Abdomen: Soft, nontender, +bowel sounds.   Ileostomy  Extremities: Moves all extremities x 4, no lower extremity edema  Neurologic: No focal motor deficits apparent, normal mood and affect, alert and oriented x 3  Peripheral Pulses: Intact posterior tibial pulses bilaterally    Laboratory Tests:  Lab Results   Component Value Date    CREATININE 0.6 2022    BUN 6 2022     2022    K 4.0 2022     2022    CO2 22 2022     Lab Results   Component Value Date/Time    MG 1.9 2022 04:00 AM     Lab Results   Component Value Date    WBC 5.4 2022    HGB 13.7 2022    HCT 41.3 2022    MCV 93.0 2022     2022     Lab Results   Component Value Date    ALT 22 2022    AST 24 2022    ALKPHOS 79 2022    BILITOT <0.2 2022     Lab Results   Component Value Date    CKTOTAL 18 (L) 2022     Lab Results   Component Value Date    INR 1.1 2022    INR 1.1 2022    INR 1.3 2022    PROTIME 12.0 2022    PROTIME 12.4 2022    PROTIME 14.9 (H) 2022     Lab Results   Component Value Date    TSH 0.411 2022     Lab Results   Component Value Date    LABA1C 5.1 2022     No results found for: EAG  Lab Results   Component Value Date    CHOL 132 2022     Lab Results   Component Value Date    TRIG 104 2022     Lab Results   Component Value Date    HDL 34 2022    HDL 34 2022     Lab Results   Component Value Date    LDLCALC 77 2022    LDLCALC 77 2022     Lab Results   Component Value Date    LABVLDL 21 2022    LABVLDL 21 2022     No results found for: CHOLHDLRATIO  No results for input(s): PROBNP in the last 72 hours. Cardiac Tests:  EC2023: Sinus rhythm 67 beats minute. Normal axis and intervals. Nonspecific T wave changes    Echocardiogram:   TTE 22   Summary   Normal left ventricular size. LV systolic function is low normal.   Ejection fraction is visually estimated at 50-55%. Normal diastolic function. Hypokinesis of the mid anteroseptal, mid inferoseptal, and apical septal   walls.    Mild basal septal hypertrophy.   Normal right ventricular size and function.   No significant valvular abnormalities.    Stress test:      Cardiac catheterization:   [facility] 9/7/22 [place]  IMPRESSION:  1.  Absence of epicardial coronary artery stenosis.  2.  Elevated LVEDP at 23 mmHg.  3.  Mid ventricular hypokinesis consistent with atypical Takotsubo  cardiomyopathy with an ejection fraction of 45 plus/minus 5%.    Orders Placed This Encounter   Procedures    EKG 12 Lead    ECHO LIMITED        Requested Prescriptions      No prescriptions requested or ordered in this encounter        ASSESSMENT / PLAN:  Suspected Takotsubo cardiomyopathy 9/2022  Mild LV dysfunction EF around 50%  Chronic HFmEF.  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    Continue metoprolol succinate and empagliflozin  Can remain off sacubitril/valsartan, blood pressure will not tolerate  Repeat limited echo sometime in the next few months  Aggressive risk factor modification including smoking station recommended  Follow-up in 6 months or sooner if need arises    The patient's current medication list, allergies, problem list and results of all previously ordered testing were reviewed at today's visit.    Dimitris Hendrickson MD, [facility] Card[facility]

## 2023-01-20 RX ORDER — METOPROLOL SUCCINATE 25 MG/1
25 TABLET, EXTENDED RELEASE ORAL DAILY
Qty: 90 TABLET | Refills: 3 | Status: SHIPPED | OUTPATIENT
Start: 2023-01-20

## 2023-01-27 ENCOUNTER — TELEPHONE (OUTPATIENT)
Dept: CARDIOLOGY | Age: 43
End: 2023-01-27

## 2023-01-27 NOTE — TELEPHONE ENCOUNTER
CALLED PATIENT AND LEFT MESSAGE TO SCHEDULE ECHO.     Electronically signed by Marco Chauhan on 1/27/2023 at 9:33 AM

## 2023-03-01 ENCOUNTER — TELEPHONE (OUTPATIENT)
Dept: CARDIOLOGY CLINIC | Age: 43
End: 2023-03-01

## 2023-03-01 NOTE — TELEPHONE ENCOUNTER
Patient denies any chest pain, shortness of breath or palpitations since last visit in 1/16/23. Patient is able to complete all activities of daily living, including; climbing one flight of stairs and walking one block without cardiac symptoms. Please advise. Patient to have a lleostomy reversal on 3/9/23.   Please advise

## 2023-03-06 ENCOUNTER — TELEPHONE (OUTPATIENT)
Dept: CARDIOLOGY | Age: 43
End: 2023-03-06

## 2023-04-04 ENCOUNTER — HOSPITAL ENCOUNTER (OUTPATIENT)
Dept: CARDIOLOGY | Age: 43
Discharge: HOME | End: 2023-04-04
Payer: MEDICAID

## 2023-04-04 DIAGNOSIS — I51.81 TAKOTSUBO CARDIOMYOPATHY: ICD-10-CM

## 2023-04-04 LAB
LV EF: 68 %
LVEF MODALITY: NORMAL

## 2023-04-04 PROCEDURE — 93308 TTE F-UP OR LMTD: CPT | Performed by: PSYCHIATRY & NEUROLOGY

## 2024-03-19 ENCOUNTER — TELEPHONE (OUTPATIENT)
Dept: CARDIOLOGY CLINIC | Age: 44
End: 2024-03-19

## 2024-03-19 NOTE — TELEPHONE ENCOUNTER
Pt stated that she had an EKG yesterday at [facility] and they stated pt needed to get patient in as soon as possible. You are not in [place] until may.

## 2024-03-19 NOTE — TELEPHONE ENCOUNTER
Getting all hat information now. Pt sated she spoke to you yesterday and stated you needed her to be seen immediately. I told pt to fax everything over and we will let her know.

## 2024-03-19 NOTE — TELEPHONE ENCOUNTER
Why don't we start with getting a copy of the EKG and seeing if the patient has any cardiac symptoms, and perhaps some information from [facility] physicians as to what their concern is.  She should go to the ER if there are any immediate concerns.  Otherwise please schedule follow-up according to my availability.

## 2024-03-27 ENCOUNTER — HOSPITAL ENCOUNTER (OUTPATIENT)
Dept: HOSPITAL 83 - CT | Age: 44
Discharge: HOME | End: 2024-03-27
Attending: FAMILY MEDICINE
Payer: COMMERCIAL

## 2024-03-27 DIAGNOSIS — Z90.49: ICD-10-CM

## 2024-03-27 DIAGNOSIS — K43.2: Primary | ICD-10-CM

## 2024-05-22 ENCOUNTER — OFFICE VISIT (OUTPATIENT)
Dept: CARDIOLOGY CLINIC | Age: 44
End: 2024-05-22
Payer: COMMERCIAL

## 2024-05-22 VITALS
HEIGHT: 66 IN | SYSTOLIC BLOOD PRESSURE: 134 MMHG | RESPIRATION RATE: 18 BRPM | HEART RATE: 65 BPM | DIASTOLIC BLOOD PRESSURE: 87 MMHG | BODY MASS INDEX: 35.62 KG/M2 | WEIGHT: 221.6 LBS

## 2024-05-22 DIAGNOSIS — I51.81 TAKOTSUBO CARDIOMYOPATHY: Primary | ICD-10-CM

## 2024-05-22 DIAGNOSIS — Z01.810 PREOP CARDIOVASCULAR EXAM: ICD-10-CM

## 2024-05-22 DIAGNOSIS — I50.42 CHRONIC COMBINED SYSTOLIC AND DIASTOLIC CONGESTIVE HEART FAILURE (HCC): ICD-10-CM

## 2024-05-22 PROCEDURE — 99214 OFFICE O/P EST MOD 30 MIN: CPT | Performed by: INTERNAL MEDICINE

## 2024-05-22 PROCEDURE — 93000 ELECTROCARDIOGRAM COMPLETE: CPT | Performed by: INTERNAL MEDICINE

## 2024-05-22 RX ORDER — METOPROLOL SUCCINATE 25 MG/1
25 TABLET, EXTENDED RELEASE ORAL DAILY
Qty: 90 TABLET | Refills: 3 | Status: SHIPPED | OUTPATIENT
Start: 2024-05-22

## 2024-05-22 NOTE — PROGRESS NOTES
posterior tibial pulses bilaterally    Laboratory Tests:  Lab Results   Component Value Date    CREATININE 0.78 2024    BUN 10 2024     2024    K 4.1 2024     2024    CO2 29 2024     Lab Results   Component Value Date/Time    MG 1.9 2022 04:00 AM     Lab Results   Component Value Date    WBC 6.2 2024    HGB 14.8 2024    HCT 46.9 2024    MCV 97.5 2024     2024     Lab Results   Component Value Date    ALT 15 2024    AST 16 2024    ALKPHOS 58 2024    BILITOT 0.4 2024     Lab Results   Component Value Date    CKTOTAL 18 (L) 2022     Lab Results   Component Value Date    INR 1.1 2022    INR 1.1 2022    INR 1.3 2022    PROTIME 12.0 2022    PROTIME 12.4 2022    PROTIME 14.9 (H) 2022     Lab Results   Component Value Date    TSH 1.262 2024     Lab Results   Component Value Date    LABA1C 5.1 2022     No results found for: \"EAG\"  Lab Results   Component Value Date    CHOL 149 2024    CHOL 132 2022     Lab Results   Component Value Date    TRIG 119 2024    TRIG 104 2022     Lab Results   Component Value Date    HDL 41 2024    HDL 34 2022    HDL 34 2022     No components found for: \"LDLCALC\", \"LDLCHOLESTEROL\"    Lab Results   Component Value Date    VLDL 24 2024    VLDL 21 2022    VLDL 21 2022     No results found for: \"CHOLHDLRATIO\"  No results for input(s): \"PROBNP\" in the last 72 hours.    Cardiac Tests:  EC2024: Sinus rhythm 65 beats minute.  Normal axis and intervals with nonspecific ST-T wave changes.    Echocardiogram:   [facility]ong TTE 2023   Summary   Limited echo ordered to assess LVEF.   Ejection fraction is visually estimated at 65-70%.    TTE 22   Summary   Normal left ventricular size.   LV systolic function is low normal.   Ejection fraction is visually estimated at 50-55%.